# Patient Record
Sex: FEMALE | Race: WHITE | NOT HISPANIC OR LATINO | Employment: FULL TIME | URBAN - METROPOLITAN AREA
[De-identification: names, ages, dates, MRNs, and addresses within clinical notes are randomized per-mention and may not be internally consistent; named-entity substitution may affect disease eponyms.]

---

## 2017-05-02 DIAGNOSIS — B00.9 RECURRENT HERPES SIMPLEX: ICD-10-CM

## 2017-05-02 NOTE — TELEPHONE ENCOUNTER
Acyclovir 400 mg     Last Written Prescription Date: 3/9/17  Last Fill Quantity: 15, # refills: 11  Last Office Visit with [facility], [facility] or [facility] prescribing provider: 6/2/16   Next 5 appointments (look out 90 days)     May 12, 2017  8:30 AM CDT   PHYSICAL with Aleshia Meléndez PA-C   [facility]    [address] [place] [address] [id number] [phone]                   Creatinine   Date Value Ref Range Status   10/23/2012 0.90 0.52 - 1.04 mg/dL Final

## 2017-05-03 RX ORDER — ACYCLOVIR 400 MG/1
TABLET ORAL
Qty: 15 TABLET | Refills: 0 | Status: SHIPPED | OUTPATIENT
Start: 2017-05-03 | End: 2018-07-25

## 2017-05-05 DIAGNOSIS — N95.1 SYMPTOMATIC MENOPAUSAL OR FEMALE CLIMACTERIC STATES: ICD-10-CM

## 2017-05-05 NOTE — TELEPHONE ENCOUNTER
estradiol (VIVELLE-DOT) 0.075 MG/24HR  Last Written Prescription Date: 3/9/16  Last Fill Quantity: 24,  # refills: 4   Last Office Visit with [facility], [facility] or [facility] prescribing provider: 6/2/16                                         Next 5 appointments (look out 90 days)     May 12, 2017  8:30 AM CDT   PHYSICAL with Aleshia Meléndez PA-C   [facility]    [address] [place] [id number] [phone]

## 2017-05-08 RX ORDER — ESTRADIOL 0.07 MG/D
FILM, EXTENDED RELEASE TRANSDERMAL
Qty: 24 PATCH | Refills: 0 | Status: SHIPPED | OUTPATIENT
Start: 2017-05-08 | End: 2017-05-11

## 2017-05-11 NOTE — PATIENT INSTRUCTIONS
Preventive Health Recommendations  Female Ages 50 - 64    Yearly exam: See your health care provider every year in order to  o Review health changes.   o Discuss preventive care.    o Review your medicines if your doctor has prescribed any.      Get a Pap test every three years (unless you have an abnormal result and your provider advises testing more often).    If you get Pap tests with HPV test, you only need to test every 5 years, unless you have an abnormal result.     You do not need a Pap test if your uterus was removed (hysterectomy) and you have not had cancer.    You should be tested each year for STDs (sexually transmitted diseases) if you're at risk.     Have a mammogram every 1 to 2 years.    Have a colonoscopy at age 50, or have a yearly FIT test (stool test). These exams screen for colon cancer.      Have a cholesterol test every 5 years, or more often if advised.    Have a diabetes test (fasting glucose) every three years. If you are at risk for diabetes, you should have this test more often.     If you are at risk for osteoporosis (brittle bone disease), think about having a bone density scan (DEXA).    Shots: Get a flu shot each year. Get a tetanus shot every 10 years.    Nutrition:     Eat at least 5 servings of fruits and vegetables each day.    Eat whole-grain bread, whole-wheat pasta and brown rice instead of white grains and rice.    Talk to your provider about Calcium and Vitamin D.     Lifestyle    Exercise at least 150 minutes a week (30 minutes a day, 5 days a week). This will help you control your weight and prevent disease.    Limit alcohol to one drink per day.    No smoking.     Wear sunscreen to prevent skin cancer.     See your dentist every six months for an exam and cleaning.    See your eye doctor every 1 to 2 years.    [facility]:  [address]  [place] [address]  Call [phone] to schedule a mammogram or call the appointment line at  [phone].  Walk in appointments for screening mammograms welcome.    [facility]  [address] [place] [address]  Call [phone] to schedule a mammogram or call the appointment line at [phone].  Walk in appointments for screening mammograms welcome.    Same day Mammogram:  [facility]  [address] [place] [address]  [phone]    [facility]  Mammograms available:  2nd Friday AM of each month  3rd Saturday AM of each month  4th Tuesday PM of each month

## 2017-05-11 NOTE — PROGRESS NOTES
SUBJECTIVE:     CC: Ila Pierre is an 61 year old woman who presents for preventive health visit.     Healthy Habits:    Do you get at least three servings of calcium containing foods daily (dairy, green leafy vegetables, etc.)? yes    Amount of exercise or daily activities, outside of work: none    Problems taking medications regularly No    Medication side effects: No    Have you had an eye exam in the past two years? yes    Do you see a dentist twice per year? yes    Do you have sleep apnea, excessive snoring or daytime drowsiness?no    Patient states that she thought she pulled a muscle in january near her right breast and was supposed to get mammogram done today but they would not let her. Does she need a diagnostic mammo? Torn meniscus in right shoulder and has some hardness on that side by the breast.  acne      Duration: January 2017    Description (location/character/radiation): patient states that she has noticed an increase in adult acne    Intensity:  mild, moderate    Accompanying signs and symptoms: see above    History (similar episodes/previous evaluation): None    Precipitating or alleviating factors: Bleach might be making worse, has mild rash by the eye, just learned about this.      Therapies tried and outcome: retinol-1-2 months, not really helping     Joint Pain     Onset: march 2017    Description:   Location: right foot  Character: Sharp when walking, does not improve with more activity.  Most shoes cause pain.      Intensity: mild, moderate    Progression of Symptoms: worse    Accompanying Signs & Symptoms:  Other symptoms: lump on ball    History:   Previous similar pain: no       Precipitating factors:   Trauma or overuse: no     Alleviating factors:  Improved by: nothing       Therapies Tried and outcome: n/a      Today's PHQ-2 Score:   PHQ-2 ( 1999 Pfizer) 6/2/2016 3/9/2016   Q1: Little interest or pleasure in doing things 0 0   Q2: Feeling down, depressed or hopeless 0 0    PHQ-2 Score 0 0       Abuse: Current or Past(Physical, Sexual or Emotional)- No  Do you feel safe in your environment - Yes    Social History   Substance Use Topics     Smoking status: Former Smoker     Quit date: 10/19/1997     Smokeless tobacco: Never Used     Alcohol use Yes      Comment: 3 beer - 3 times a week     The patient does not drink >3 drinks per day nor >7 drinks per week.    Recent Labs   Lab Test  03/09/16   0945  02/06/15   0911   CHOL  183  164   HDL  90  84   LDL  82  67   TRIG  56  67   CHOLHDLRATIO   --   2.0   NHDL  93   --        Reviewed orders with patient.  Reviewed health maintenance and updated orders accordingly - Yes    Mammo Decision Support:  Patient over age 50, mutual decision to screen reflected in health maintenance.    Pertinent mammograms are reviewed under the imaging tab.  History of abnormal Pap smear: Status post benign hysterectomy. Health Maintenance and Surgical History updated.    Reviewed and updated as needed this visit by clinical staff  Tobacco  Allergies  Meds  Problems  Med Hx  Surg Hx  Fam Hx  Soc Hx          Reviewed and updated as needed this visit by Provider  Tobacco  Allergies  Meds  Problems  Med Hx  Surg Hx  Fam Hx  Soc Hx         ROS:  C: NEGATIVE for fever, chills, change in weight  INTEGUMENTARY/SKIN: acne on face, not improved with tretinoin.    E: NEGATIVE for vision changes or irritation  ENT: NEGATIVE for ear, mouth and throat problems  R: NEGATIVE for significant cough or SOB  BREAST: POSITIVE for mass with tenderness and pulling meniscus in right shoulder  CV: NEGATIVE for chest pain, palpitations or peripheral edema  GI: NEGATIVE for nausea, abdominal pain, heartburn, or change in bowel habits  : NEGATIVE for unusual urinary or vaginal symptoms. No vaginal bleeding.  MUSCULOSKELETAL:POSITIVE  for pain on base of right foot  N: NEGATIVE for weakness, dizziness or paresthesias  E: NEGATIVE for temperature intolerance, skin/hair  "changes  P: NEGATIVE for changes in mood or affect     Problem list, Medication list, Allergies, and Medical/Social/Surgical histories reviewed in [facility] and updated as appropriate.  BP Readings from Last 3 Encounters:   05/12/17 124/72   06/02/16 124/80   03/09/16 128/86    Wt Readings from Last 3 Encounters:   05/12/17 214 lb (97.1 kg)   06/02/16 218 lb 8 oz (99.1 kg)   03/09/16 221 lb (100.2 kg)            OBJECTIVE:     /72 (BP Location: Left arm, Patient Position: Chair, Cuff Size: Adult Regular)  Pulse 59  Temp 97.8  F (36.6  C) (Tympanic)  Resp 15  Ht 5' 9\" (1.753 m)  Wt 214 lb (97.1 kg)  SpO2 100%  BMI 31.6 kg/m2  EXAM:  GENERAL APPEARANCE: healthy, alert and no distress  EYES: Eyes grossly normal to inspection, PERRL and conjunctivae and sclerae normal  HENT: ear canals and TM's normal, nose and mouth without ulcers or lesions, oropharynx clear and oral mucous membranes moist  NECK: no adenopathy, no asymmetry, masses, or scars and thyroid normal to palpation  RESP: lungs clear to auscultation - no rales, rhonchi or wheezes  BREAST: 3-4 cm area of tenderness and tightness at 11-12:00 on right breast, no palpable axillary masses or adenopathy, no skin changes or nipple discharge.  CV: regular rate and rhythm, normal S1 S2, no S3 or S4, no murmur, click or rub, no peripheral edema and peripheral pulses strong  ABDOMEN: soft, nontender, no hepatosplenomegaly, no masses and bowel sounds normal  MS: no musculoskeletal defects are noted, gait is age appropriate without ataxia.  2 cm callus on ball of right foot, mildly tender to palpation.  SKIN: acne  NEURO: Normal strength and tone, sensory exam grossly normal, mentation intact and speech normal  PSYCH: mentation appears normal and affect normal/bright    ASSESSMENT/PLAN:         ICD-10-CM    1. Routine general medical examination at a health care facility Z00.00 LIPID REFLEX TO DIRECT LDL PANEL     Glucose   2. Chronic obstructive pulmonary disease, " "unspecified COPD type (H) J44.9 COPD ACTION PLAN   3. Vitamin D deficiency E55.9 Cholecalciferol (VITAMIN D) 2000 UNITS tablet   4. Symptomatic menopausal or female climacteric states N95.1 estradiol (VIVELLE-DOT) 0.075 MG/24HR BIW patch   5. Essential hypertension with goal blood pressure less than 140/90 I10 atenolol (TENORMIN) 50 MG tablet     Vitamin D Deficiency   6. Need for vaccination Z23 TD (ADULT, 7+) PRESERVE FREE     VACCINE ADMINISTRATION, INITIAL     Will see if removing bleach from laundry helps with face, if does not, will fill prescription for acne treatment.    Will soak right foot in warm water and file down callus with pomus  stone, avoid painful footwear.  If not improving, call and will put in order for podiatry.    Will get diagnostic mammogram on right due to mass and tenderness, orders placed.    COUNSELING:   Reviewed preventive health counseling, as reflected in patient instructions       reports that she quit smoking about 19 years ago. She has never used smokeless tobacco.    Estimated body mass index is 31.6 kg/(m^2) as calculated from the following:    Height as of this encounter: 5' 9\" (1.753 m).    Weight as of this encounter: 214 lb (97.1 kg).   Weight management plan: : -30 minutes of exercise 5 days a week (walking, jogging, housework, biking).  -Eat at least 5 servings of fruits and vegetables daily.   -Eat whole-grain bread, whole-wheat pasta and brown rice instead of white grains and rice.    Counseling Resources:  ATP IV Guidelines  Pooled Cohorts Equation Calculator  Breast Cancer Risk Calculator  FRAX Risk Assessment  ICSI Preventive Guidelines  Dietary Guidelines for Americans, 2010  USDA's MyPlate  ASA Prophylaxis  Lung CA Screening    Aleshia Meléndez PA-C  [facility]  "

## 2017-05-12 ENCOUNTER — OFFICE VISIT (OUTPATIENT)
Dept: FAMILY MEDICINE | Facility: CLINIC | Age: 62
End: 2017-05-12
Payer: COMMERCIAL

## 2017-05-12 VITALS
DIASTOLIC BLOOD PRESSURE: 72 MMHG | HEIGHT: 69 IN | SYSTOLIC BLOOD PRESSURE: 124 MMHG | WEIGHT: 214 LBS | RESPIRATION RATE: 15 BRPM | TEMPERATURE: 97.8 F | HEART RATE: 59 BPM | OXYGEN SATURATION: 100 % | BODY MASS INDEX: 31.7 KG/M2

## 2017-05-12 DIAGNOSIS — Z23 NEED FOR VACCINATION: ICD-10-CM

## 2017-05-12 DIAGNOSIS — I10 ESSENTIAL HYPERTENSION WITH GOAL BLOOD PRESSURE LESS THAN 140/90: ICD-10-CM

## 2017-05-12 DIAGNOSIS — E55.9 VITAMIN D DEFICIENCY: ICD-10-CM

## 2017-05-12 DIAGNOSIS — J44.9 CHRONIC OBSTRUCTIVE PULMONARY DISEASE, UNSPECIFIED COPD TYPE (H): ICD-10-CM

## 2017-05-12 DIAGNOSIS — Z00.00 ROUTINE GENERAL MEDICAL EXAMINATION AT A HEALTH CARE FACILITY: Primary | ICD-10-CM

## 2017-05-12 DIAGNOSIS — M79.671 RIGHT FOOT PAIN: ICD-10-CM

## 2017-05-12 DIAGNOSIS — N63.10 LUMP OF RIGHT BREAST: ICD-10-CM

## 2017-05-12 DIAGNOSIS — N95.1 SYMPTOMATIC MENOPAUSAL OR FEMALE CLIMACTERIC STATES: ICD-10-CM

## 2017-05-12 DIAGNOSIS — L70.0 ACNE VULGARIS: ICD-10-CM

## 2017-05-12 LAB
CHOLEST SERPL-MCNC: 152 MG/DL
GLUCOSE SERPL-MCNC: 87 MG/DL (ref 70–99)
HDLC SERPL-MCNC: 80 MG/DL
LDLC SERPL CALC-MCNC: 60 MG/DL
NONHDLC SERPL-MCNC: 72 MG/DL
TRIGL SERPL-MCNC: 60 MG/DL

## 2017-05-12 PROCEDURE — 99396 PREV VISIT EST AGE 40-64: CPT | Mod: 25 | Performed by: PHYSICIAN ASSISTANT

## 2017-05-12 PROCEDURE — 80061 LIPID PANEL: CPT | Performed by: PHYSICIAN ASSISTANT

## 2017-05-12 PROCEDURE — 36415 COLL VENOUS BLD VENIPUNCTURE: CPT | Performed by: PHYSICIAN ASSISTANT

## 2017-05-12 PROCEDURE — 82947 ASSAY GLUCOSE BLOOD QUANT: CPT | Performed by: PHYSICIAN ASSISTANT

## 2017-05-12 PROCEDURE — 90714 TD VACC NO PRESV 7 YRS+ IM: CPT | Performed by: PHYSICIAN ASSISTANT

## 2017-05-12 PROCEDURE — 90471 IMMUNIZATION ADMIN: CPT | Performed by: PHYSICIAN ASSISTANT

## 2017-05-12 PROCEDURE — 99214 OFFICE O/P EST MOD 30 MIN: CPT | Mod: 25 | Performed by: PHYSICIAN ASSISTANT

## 2017-05-12 PROCEDURE — 82306 VITAMIN D 25 HYDROXY: CPT | Performed by: PHYSICIAN ASSISTANT

## 2017-05-12 RX ORDER — ESTRADIOL 0.07 MG/D
FILM, EXTENDED RELEASE TRANSDERMAL
Qty: 24 PATCH | Refills: 3 | Status: SHIPPED | OUTPATIENT
Start: 2017-05-12 | End: 2018-07-13

## 2017-05-12 RX ORDER — CLINDAMYCIN PHOSPHATE AND BENZOYL PEROXIDE 10; 50 MG/G; MG/G
GEL TOPICAL AT BEDTIME
Qty: 45 G | Refills: 2 | Status: ON HOLD | OUTPATIENT
Start: 2017-05-12 | End: 2018-12-14

## 2017-05-12 RX ORDER — ATENOLOL 50 MG/1
50 TABLET ORAL DAILY
Qty: 90 TABLET | Refills: 3 | Status: SHIPPED | OUTPATIENT
Start: 2017-05-12 | End: 2018-07-13

## 2017-05-12 RX ORDER — CHOLECALCIFEROL (VITAMIN D3) 50 MCG
2000 TABLET ORAL DAILY
Qty: 100 TABLET | Refills: 3 | Status: ON HOLD | OUTPATIENT
Start: 2017-05-12 | End: 2018-12-14

## 2017-05-12 NOTE — LETTER
[facility]  [address]  [facility] [address]  [phone]                                                                                                           Ila Pierre  [address] [facility] [address]    May 16, 2017      Dear Ila,    The results of your recent tests were reviewed and are enclosed.     - Your Cholesterol is normal.  - Your glucose (screening for diabetes) was normal.  - Your Vitamin D level is normal.    Results for orders placed or performed in visit on 05/12/17   LIPID REFLEX TO DIRECT LDL PANEL   Result Value Ref Range    Cholesterol 152 <200 mg/dL    Triglycerides 60 <150 mg/dL    HDL Cholesterol 80 >49 mg/dL    LDL Cholesterol Calculated 60 <100 mg/dL    Non HDL Cholesterol 72 <130 mg/dL   Glucose   Result Value Ref Range    Glucose 87 70 - 99 mg/dL   Vitamin D Deficiency   Result Value Ref Range    Vitamin D Deficiency screening 52 20 - 75 ug/L     Thank you for choosing [facility].  We appreciate the opportunity to serve you and look forward to supporting your healthcare needs in the future.    If you have any questions or concerns, please call me or my staff at [phone].      Sincerely,        Aleshia Meléndez PA-C

## 2017-05-12 NOTE — NURSING NOTE
Screening Questionnaire for Adult Immunization    Are you sick today?   No   Do you have allergies to medications, food, a vaccine component or latex?   No   Have you ever had a serious reaction after receiving a vaccination?   No   Do you have a long-term health problem with heart disease, lung disease, asthma, kidney disease, metabolic disease (e.g. diabetes), anemia, or other blood disorder?   Yes-COPD, hypertension   Do you have cancer, leukemia, HIV/AIDS, or any other immune system problem?   No   In the past 3 months, have you taken medications that affect  your immune system, such as prednisone, other steroids, or anticancer drugs; drugs for the treatment of rheumatoid arthritis, Crohn s disease, or psoriasis; or have you had radiation treatments?   No   Have you had a seizure, or a brain or other nervous system problem?   No   During the past year, have you received a transfusion of blood or blood     products, or been given immune (gamma) globulin or antiviral drug?   No   For women: Are you pregnant or is there a chance you could become        pregnant during the next month?   No   Have you received any vaccinations in the past 4 weeks?   No     Immunization questionnaire answers were all negative.      MNVFC doesn't apply on this patient    Per orders of CHRISTIANO Proctor, injection of TD given by Shahida Soliman. Patient instructed to remain in clinic for 20 minutes afterwards, and to report any adverse reaction to me immediately.       Screening performed by Shahida Soliman on 5/12/2017 at 9:02 AM.

## 2017-05-12 NOTE — MR AVS SNAPSHOT
After Visit Summary   5/12/2017    Ila Pierre    MRN: [id number]           Patient Information     Date Of Birth          1955        Visit Information        Provider Department      5/12/2017 8:30 AM Aleshia Meléndez PA-C [facility]        Today's Diagnoses     Routine general medical examination at a health care facility    -  1    Chronic obstructive pulmonary disease, unspecified COPD type (H)        Vitamin D deficiency        Symptomatic menopausal or female climacteric states        Essential hypertension with goal blood pressure less than 140/90        Need for vaccination        Acne vulgaris        Right foot pain        Lump of right breast          Care Instructions      Preventive Health Recommendations  Female Ages 50 - 64    Yearly exam: See your health care provider every year in order to  o Review health changes.   o Discuss preventive care.    o Review your medicines if your doctor has prescribed any.      Get a Pap test every three years (unless you have an abnormal result and your provider advises testing more often).    If you get Pap tests with HPV test, you only need to test every 5 years, unless you have an abnormal result.     You do not need a Pap test if your uterus was removed (hysterectomy) and you have not had cancer.    You should be tested each year for STDs (sexually transmitted diseases) if you're at risk.     Have a mammogram every 1 to 2 years.    Have a colonoscopy at age 50, or have a yearly FIT test (stool test). These exams screen for colon cancer.      Have a cholesterol test every 5 years, or more often if advised.    Have a diabetes test (fasting glucose) every three years. If you are at risk for diabetes, you should have this test more often.     If you are at risk for osteoporosis (brittle bone disease), think about having a bone density scan (DEXA).    Shots: Get a flu shot each year. Get a tetanus  shot every 10 years.    Nutrition:     Eat at least 5 servings of fruits and vegetables each day.    Eat whole-grain bread, whole-wheat pasta and brown rice instead of white grains and rice.    Talk to your provider about Calcium and Vitamin D.     Lifestyle    Exercise at least 150 minutes a week (30 minutes a day, 5 days a week). This will help you control your weight and prevent disease.    Limit alcohol to one drink per day.    No smoking.     Wear sunscreen to prevent skin cancer.     See your dentist every six months for an exam and cleaning.    See your eye doctor every 1 to 2 years.    [facility] [place]:  [address]  [place] [address]  Call [phone] to schedule a mammogram or call the appointment line at [phone].  Walk in appointments for screening mammograms welcome.    [facility]  [address] [place] [address]  Call [phone] to schedule a mammogram or call the appointment line at [phone].  Walk in appointments for screening mammograms welcome.    Same day Mammogram:  [facility]  [address] [place] [address]  [phone]    [facility]  Mammograms available:  2nd Friday AM of each month  3rd Saturday AM of each month  4th Tuesday PM of each month            Follow-ups after your visit        Future tests that were ordered for you today     Open Future Orders        Priority Expected Expires Ordered    MA Diagnostic Digital Right Routine  5/12/2018 5/12/2017    MA Screening Digital Left Routine  5/12/2018 5/12/2017    US Breast Right Complete 4 Quadrants Routine  5/12/2018 5/12/2017            Who to contact     If you have questions or need follow up information about today's clinic visit or your schedule please contact [facility] directly at [phone].  Normal or non-critical lab and imaging results will be communicated to you by  "MyChart, letter or phone within 4 business days after the clinic has received the results. If you do not hear from us within 7 days, please contact the clinic through [employer] or phone. If you have a critical or abnormal lab result, we will notify you by phone as soon as possible.  Submit refill requests through [employer] or call your pharmacy and they will forward the refill request to us. Please allow 3 business days for your refill to be completed.          Additional Information About Your Visit        [employer] Information     [employer] lets you send messages to your doctor, view your test results, renew your prescriptions, schedule appointments and more. To sign up, go to [email or web address] . Click on \"Log in\" on the left side of the screen, which will take you to the Welcome page. Then click on \"Sign up Now\" on the right side of the page.     You will be asked to enter the access code listed below, as well as some personal information. Please follow the directions to create your username and password.     Your access code is: [email or web address]  Expires: 8/10/2017  8:52 AM     Your access code will  in 90 days. If you need help or a new code, please call your [place] clinic or [phone].        Care EveryWhere ID     This is your Care EveryWhere ID. This could be used by other organizations to access your [place] medical records  [id number]        Your Vitals Were     Pulse Temperature Respirations Height Pulse Oximetry BMI (Body Mass Index)    59 97.8  F (36.6  C) (Tympanic) 15 5' 9\" (1.753 m) 100% 31.6 kg/m2       Blood Pressure from Last 3 Encounters:   17 124/72   16 124/80   16 128/86    Weight from Last 3 Encounters:   17 214 lb (97.1 kg)   16 218 lb 8 oz (99.1 kg)   16 221 lb (100.2 kg)              We Performed the Following     COPD ACTION PLAN     Glucose     LIPID REFLEX TO DIRECT LDL PANEL     TD (ADULT, 7+) PRESERVE FREE     VACCINE ADMINISTRATION, INITIAL "     Vitamin D Deficiency          Today's Medication Changes          These changes are accurate as of: 5/12/17  8:52 AM.  If you have any questions, ask your nurse or doctor.               Start taking these medicines.        Dose/Directions    clindamycin-benzoyl Per (Refr) 1.2-5 % Gel   Used for:  Acne vulgaris   Started by:  Aleshia Meléndez PA-C        Apply topically At Bedtime   Quantity:  45 g   Refills:  2         These medicines have changed or have updated prescriptions.        Dose/Directions    estradiol 0.075 MG/24HR BIW patch   Commonly known as:  VIVELLE-DOT   This may have changed:  See the new instructions.   Used for:  Symptomatic menopausal or female climacteric states   Changed by:  Aleshia Meléndez PA-C        APPLY 1 PATCH EXTERNALLY TO THE SKIN 2 TIMES A WEEK   Quantity:  24 patch   Refills:  3            Where to get your medicines      These medications were sent to [facility] [id number] [place] [address] [address] AT [place] [id number], [facility] [address]     Phone:  [phone]     atenolol 50 MG tablet    estradiol 0.075 MG/24HR BIW patch    vitamin D 2000 UNITS tablet         Some of these will need a paper prescription and others can be bought over the counter.  Ask your nurse if you have questions.     Bring a paper prescription for each of these medications     clindamycin-benzoyl Per (Refr) 1.2-5 % Gel                Primary Care Provider Office Phone # Fax #    Aleshia Meléndez PA-C [phone]       [facility] [address]  [facility] [address]        Thank you!     Thank you for choosing [facility]  for your care. Our goal is always to provide you with excellent care. Hearing back from our patients is one way we can continue to improve our services. Please take a few minutes to complete the written survey that you may receive in the  mail after your visit with us. Thank you!             Your Updated Medication List - Protect others around you: Learn how to safely use, store and throw away your medicines at www.disposemymeds.org.          This list is accurate as of: 5/12/17  8:52 AM.  Always use your most recent med list.                   Brand Name Dispense Instructions for use    acyclovir 400 MG tablet    ZOVIRAX    15 tablet    TAKE 1 TABLET(400 MG) BY MOUTH THREE TIMES DAILY       ALEVE 220 MG tablet   Generic drug:  naproxen sodium      Take 1 tablet by mouth 2 times daily (with meals).       aspirin 81 MG tablet      Take 1 tablet by mouth daily.       atenolol 50 MG tablet    TENORMIN    90 tablet    Take 1 tablet (50 mg) by mouth daily       CALCIUM PO      Take 1 tablet by mouth daily.       CENTRUM SILVER per tablet      Take 1 tablet by mouth daily.       CLARITIN 10 MG tablet   Generic drug:  loratadine      Take 10 mg by mouth as needed       clindamycin-benzoyl Per (Refr) 1.2-5 % Gel     45 g    Apply topically At Bedtime       estradiol 0.075 MG/24HR BIW patch    VIVELLE-DOT    24 patch    APPLY 1 PATCH EXTERNALLY TO THE SKIN 2 TIMES A WEEK       l-tyrosine 500 MG Tabs      Take 2 tablets by mouth daily.       omega-3 fatty acids 1200 MG capsule      Take 1 capsule by mouth daily.       PROBIOTIC DAILY PO          vitamin D 2000 UNITS tablet     100 tablet    Take 2,000 Units by mouth daily

## 2017-05-12 NOTE — NURSING NOTE
"Chief Complaint   Patient presents with     Physical       Initial /72 (BP Location: Left arm, Patient Position: Chair, Cuff Size: Adult Regular)  Pulse 59  Temp 97.8  F (36.6  C) (Tympanic)  Resp 15  Ht 5' 9\" (1.753 m)  Wt 214 lb (97.1 kg)  SpO2 100%  BMI 31.6 kg/m2 Estimated body mass index is 31.6 kg/(m^2) as calculated from the following:    Height as of this encounter: 5' 9\" (1.753 m).    Weight as of this encounter: 214 lb (97.1 kg).  Medication Reconciliation: complete    "

## 2017-05-15 DIAGNOSIS — B00.9 RECURRENT HERPES SIMPLEX: Primary | ICD-10-CM

## 2017-05-15 LAB — DEPRECATED CALCIDIOL+CALCIFEROL SERPL-MC: 52 UG/L (ref 20–75)

## 2017-05-16 RX ORDER — ACYCLOVIR 400 MG/1
TABLET ORAL
Qty: 15 TABLET | Refills: 0 | Status: SHIPPED | OUTPATIENT
Start: 2017-05-16 | End: 2018-07-26

## 2017-05-16 NOTE — TELEPHONE ENCOUNTER
Acyclovir 400 mg     Last Written Prescription Date: 5/3/17  Last Fill Quantity: 15, # refills: 0  Last Office Visit with [facility] or [facility] prescribing provider: 5/12/17        Creatinine   Date Value Ref Range Status   10/23/2012 0.90 0.52 - 1.04 mg/dL Final

## 2017-05-16 NOTE — TELEPHONE ENCOUNTER
Call to patient without answer left message to call and schedule a lab appointment at her earliest convenience.

## 2017-05-16 NOTE — TELEPHONE ENCOUNTER
I just saw her for her physical and forgot to order it.  I put it in as a future lab and we can ask her to come get it drawn when she is able.

## 2017-05-25 ENCOUNTER — HOSPITAL ENCOUNTER (OUTPATIENT)
Dept: MAMMOGRAPHY | Facility: CLINIC | Age: 62
End: 2017-05-25
Attending: PHYSICIAN ASSISTANT
Payer: COMMERCIAL

## 2017-05-25 ENCOUNTER — HOSPITAL ENCOUNTER (OUTPATIENT)
Dept: MAMMOGRAPHY | Facility: CLINIC | Age: 62
Discharge: HOME | End: 2017-05-25
Attending: PHYSICIAN ASSISTANT | Admitting: PHYSICIAN ASSISTANT
Payer: COMMERCIAL

## 2017-05-25 DIAGNOSIS — N63.10 LUMP OF RIGHT BREAST: ICD-10-CM

## 2017-05-25 PROCEDURE — 76642 ULTRASOUND BREAST LIMITED: CPT | Mod: RT

## 2017-05-25 PROCEDURE — G0204 DX MAMMO INCL CAD BI: HCPCS

## 2018-07-13 DIAGNOSIS — N95.1 SYMPTOMATIC MENOPAUSAL OR FEMALE CLIMACTERIC STATES: ICD-10-CM

## 2018-07-13 DIAGNOSIS — I10 ESSENTIAL HYPERTENSION WITH GOAL BLOOD PRESSURE LESS THAN 140/90: ICD-10-CM

## 2018-07-13 RX ORDER — ATENOLOL 50 MG/1
TABLET ORAL
Qty: 90 TABLET | Refills: 0 | Status: SHIPPED | OUTPATIENT
Start: 2018-07-13 | End: 2018-07-25

## 2018-07-13 RX ORDER — ESTRADIOL 0.07 MG/D
FILM, EXTENDED RELEASE TRANSDERMAL
Qty: 24 PATCH | Refills: 0 | Status: SHIPPED | OUTPATIENT
Start: 2018-07-13 | End: 2018-07-25

## 2018-07-13 NOTE — TELEPHONE ENCOUNTER
"Requested Prescriptions   Pending Prescriptions Disp Refills     estradiol (VIVELLE-DOT) 0.075 MG/24HR BIW patch [Pharmacy Med Name: ESTRADIOL 0.075MG PATCH (TWICE WK)]  Last Written Prescription Date:  8/18/2017  Last Fill Quantity: 24 patch,  # refills: 3   Last Office Visit  5/12/2017        with  [facility], [facility] or  Health prescribing provider:     Future Office Visit:    Next 5 appointments (look out 90 days)     Jul 26, 2018  7:30 AM CDT   PHYSICAL with Aleshia Meléndez PA-C   [facility]    [address] [place] [id number] [phone]                24 patch 0     Sig: APPLY 1 PATCH TO SKIN 2 TIMES PER WEEK    Hormone Replacement Therapy Failed    7/13/2018  3:45 AM       Failed - Blood pressure under 140/90 in past 12 months    BP Readings from Last 3 Encounters:   05/12/17 124/72   06/02/16 124/80   03/09/16 128/86          Failed - Patient has mammogram in past 2 years on file if age 50-75       Passed - Recent (12 mo) or future (30 days) visit within the authorizing provider's specialty    Patient had office visit in the last 12 months or has a visit in the next 30 days with authorizing provider or within the authorizing provider's specialty.  See \"Patient Info\" tab in inbasket, or \"Choose Columns\" in Meds & Orders section of the refill encounter.           Passed - Patient is 18 years of age or older       Passed - No active pregnancy on record       Passed - No positive pregnancy test on record in past 12 months              atenolol (TENORMIN) 50 MG tablet [Pharmacy Med Name: ATENOLOL 50MG TABLETS]  Last Written Prescription Date:  5/12/2017  Last Fill Quantity: 90 tablet,  # refills: 3   Last Office Visit  5/12/2017        with  [facility], [facility] or  Health prescribing provider:     Future Office Visit:    Next 5 appointments (look out 90 days)     Jul 26, 2018  7:30 AM CDT   PHYSICAL with Aleshia " "Milady Meléndez PA-C   [facility] ([facility])    [address] [id number] [phone]                90 tablet 0     Sig: TAKE 1 TABLET(50 MG) BY MOUTH DAILY    Beta-Blockers Protocol Failed    7/13/2018  3:45 AM       Failed - Blood pressure under 140/90 in past 12 months    BP Readings from Last 3 Encounters:   05/12/17 124/72   06/02/16 124/80   03/09/16 128/86            Passed - Patient is age 6 or older       Passed - Recent (12 mo) or future (30 days) visit within the authorizing provider's specialty    Patient had office visit in the last 12 months or has a visit in the next 30 days with authorizing provider or within the authorizing provider's specialty.  See \"Patient Info\" tab in inbasket, or \"Choose Columns\" in Meds & Orders section of the refill encounter.              "

## 2018-07-26 ENCOUNTER — OFFICE VISIT (OUTPATIENT)
Dept: FAMILY MEDICINE | Facility: CLINIC | Age: 63
End: 2018-07-26
Payer: COMMERCIAL

## 2018-07-26 VITALS
HEIGHT: 69 IN | TEMPERATURE: 97.2 F | SYSTOLIC BLOOD PRESSURE: 136 MMHG | BODY MASS INDEX: 33.92 KG/M2 | DIASTOLIC BLOOD PRESSURE: 78 MMHG | WEIGHT: 229 LBS | HEART RATE: 55 BPM | OXYGEN SATURATION: 98 % | RESPIRATION RATE: 16 BRPM

## 2018-07-26 DIAGNOSIS — Z00.00 ROUTINE HISTORY AND PHYSICAL EXAMINATION OF ADULT: Primary | ICD-10-CM

## 2018-07-26 DIAGNOSIS — I10 ESSENTIAL HYPERTENSION WITH GOAL BLOOD PRESSURE LESS THAN 140/90: ICD-10-CM

## 2018-07-26 DIAGNOSIS — M20.41 HAMMER TOE OF RIGHT FOOT: ICD-10-CM

## 2018-07-26 DIAGNOSIS — N95.1 SYMPTOMATIC MENOPAUSAL OR FEMALE CLIMACTERIC STATES: ICD-10-CM

## 2018-07-26 DIAGNOSIS — J44.9 CHRONIC OBSTRUCTIVE PULMONARY DISEASE, UNSPECIFIED COPD TYPE (H): ICD-10-CM

## 2018-07-26 DIAGNOSIS — Z12.11 SCREENING FOR COLON CANCER: ICD-10-CM

## 2018-07-26 DIAGNOSIS — B00.9 RECURRENT HERPES SIMPLEX: ICD-10-CM

## 2018-07-26 LAB
ANION GAP SERPL CALCULATED.3IONS-SCNC: 7 MMOL/L (ref 3–14)
BUN SERPL-MCNC: 13 MG/DL (ref 7–30)
CALCIUM SERPL-MCNC: 8.5 MG/DL (ref 8.5–10.1)
CHLORIDE SERPL-SCNC: 108 MMOL/L (ref 94–109)
CHOLEST SERPL-MCNC: 150 MG/DL
CO2 SERPL-SCNC: 24 MMOL/L (ref 20–32)
CREAT SERPL-MCNC: 0.72 MG/DL (ref 0.52–1.04)
GFR SERPL CREATININE-BSD FRML MDRD: 82 ML/MIN/1.7M2
GLUCOSE SERPL-MCNC: 87 MG/DL (ref 70–99)
HDLC SERPL-MCNC: 74 MG/DL
LDLC SERPL CALC-MCNC: 64 MG/DL
NONHDLC SERPL-MCNC: 76 MG/DL
POTASSIUM SERPL-SCNC: 4.1 MMOL/L (ref 3.4–5.3)
SODIUM SERPL-SCNC: 139 MMOL/L (ref 133–144)
TRIGL SERPL-MCNC: 60 MG/DL

## 2018-07-26 PROCEDURE — 80061 LIPID PANEL: CPT | Performed by: PHYSICIAN ASSISTANT

## 2018-07-26 PROCEDURE — 99396 PREV VISIT EST AGE 40-64: CPT | Performed by: PHYSICIAN ASSISTANT

## 2018-07-26 PROCEDURE — 36415 COLL VENOUS BLD VENIPUNCTURE: CPT | Performed by: PHYSICIAN ASSISTANT

## 2018-07-26 PROCEDURE — 80048 BASIC METABOLIC PNL TOTAL CA: CPT | Performed by: PHYSICIAN ASSISTANT

## 2018-07-26 RX ORDER — ACYCLOVIR 400 MG/1
TABLET ORAL
Qty: 15 TABLET | Refills: 3 | Status: SHIPPED | OUTPATIENT
Start: 2018-07-26 | End: 2019-02-20

## 2018-07-26 RX ORDER — ESTRADIOL 0.07 MG/D
FILM, EXTENDED RELEASE TRANSDERMAL
Qty: 24 PATCH | Refills: 3 | Status: ON HOLD | OUTPATIENT
Start: 2018-07-26 | End: 2018-12-14

## 2018-07-26 RX ORDER — ATENOLOL 50 MG/1
TABLET ORAL
Qty: 90 TABLET | Refills: 3 | Status: SHIPPED | OUTPATIENT
Start: 2018-07-26 | End: 2019-10-06

## 2018-07-26 NOTE — PATIENT INSTRUCTIONS
Preventive Health Recommendations  Female Ages 50 - 64    Yearly exam: See your health care provider every year in order to  o Review health changes.   o Discuss preventive care.    o Review your medicines if your doctor has prescribed any.      Get a Pap test every three years (unless you have an abnormal result and your provider advises testing more often).    If you get Pap tests with HPV test, you only need to test every 5 years, unless you have an abnormal result.     You do not need a Pap test if your uterus was removed (hysterectomy) and you have not had cancer.    You should be tested each year for STDs (sexually transmitted diseases) if you're at risk.     Have a mammogram every 1 to 2 years.    Have a colonoscopy at age 50, or have a yearly FIT test (stool test). These exams screen for colon cancer.      Have a cholesterol test every 5 years, or more often if advised.    Have a diabetes test (fasting glucose) every three years. If you are at risk for diabetes, you should have this test more often.     If you are at risk for osteoporosis (brittle bone disease), think about having a bone density scan (DEXA).    Shots: Get a flu shot each year. Get a tetanus shot every 10 years.    Nutrition:     Eat at least 5 servings of fruits and vegetables each day.    Eat whole-grain bread, whole-wheat pasta and brown rice instead of white grains and rice.    Get adequate Calcium and Vitamin D.     Lifestyle    Exercise at least 150 minutes a week (30 minutes a day, 5 days a week). This will help you control your weight and prevent disease.    Limit alcohol to one drink per day.    No smoking.     Wear sunscreen to prevent skin cancer.     See your dentist every six months for an exam and cleaning.    See your eye doctor every 1 to 2 years.  [facility]:  [address]  [place] [address]  Call [phone] to schedule a mammogram or call the appointment line at [phone].  Walk  in appointments for screening mammograms welcome.    [facility]  [address] [facility] [address]  Call [phone] to schedule a mammogram or call the appointment line at [phone].  Walk in appointments for screening mammograms welcome.    Same day Mammogram:  [facility]  [address] [place] [place] [address]  [phone]    [facility]  Mammograms available:  2nd Friday AM of each month  3rd Saturday AM of each month  4th Tuesday PM of each month

## 2018-07-26 NOTE — PROGRESS NOTES
SUBJECTIVE:   CC: Ila Pierre is an 62 year old woman who presents for preventive health visit.     Physical   Annual:     Getting at least 3 servings of Calcium per day:  Yes    Bi-annual eye exam:  Yes    Dental care twice a year:  Yes    Sleep apnea or symptoms of sleep apnea:  None    Diet:  Regular (no restrictions)    Frequency of exercise:  1 day/week    Duration of exercise:  15-30 minutes    Taking medications regularly:  Yes    Medication side effects:  Not applicable    Additional concerns today:  YES        -patient was wearing orthotics for a period of time but they caused her to have a hammer toe on the second toe in, right side      Today's PHQ-2 Score:   PHQ-2 ( 1999 [employer]) 7/26/2018   Q1: Little interest or pleasure in doing things 0   Q2: Feeling down, depressed or hopeless 0   PHQ-2 Score 0   Q1: Little interest or pleasure in doing things Not at all   Q2: Feeling down, depressed or hopeless Not at all   PHQ-2 Score 0       Abuse: Current or Past(Physical, Sexual or Emotional)- No  Do you feel safe in your environment - Yes    Social History   Substance Use Topics     Smoking status: Former Smoker     Quit date: 10/19/1997     Smokeless tobacco: Never Used     Alcohol use Yes      Comment: 3 beer - 3 times a week     Alcohol Use 7/26/2018   If you drink alcohol do you typically have greater than 3 drinks per day OR greater than 7 drinks per week? No   No flowsheet data found.    Reviewed orders with patient.  Reviewed health maintenance and updated orders accordingly - Yes  BP Readings from Last 3 Encounters:   07/26/18 136/78   05/12/17 124/72   06/02/16 124/80    Wt Readings from Last 3 Encounters:   07/26/18 229 lb (103.9 kg)   05/12/17 214 lb (97.1 kg)   06/02/16 218 lb 8 oz (99.1 kg)                  Recent Labs   Lab Test  05/12/17   0857  03/09/16   0945  02/06/15   0911  10/23/12   0917  06/07/11   1332   LDL  60  82  67   --   78.8   HDL  80  90  84   --   62*   TRIG  60  56   "67   --   91   ALT   --    --    --   18  13   CR   --    --    --   0.90  0.73   GFRESTIMATED   --    --    --   65   --    GFRESTBLACK   --    --    --   79   --    POTASSIUM   --    --    --   5.0  4.2        Patient over age 50, mutual decision to screen reflected in health maintenance.    Pertinent mammograms are reviewed under the imaging tab.  History of abnormal Pap smear: Status post benign hysterectomy. Health Maintenance and Surgical History updated.  PAP / HPV 10/23/2012   PAP NIL     Reviewed and updated as needed this visit by clinical staff  Tobacco  Allergies  Meds  Problems  Med Hx  Surg Hx  Fam Hx  Soc Hx          Reviewed and updated as needed this visit by Provider  Tobacco  Allergies  Meds  Problems  Med Hx  Surg Hx  Fam Hx  Soc Hx           Review of Systems  CONSTITUTIONAL: NEGATIVE for fever, chills, change in weight  INTEGUMENTARY/SKIN: NEGATIVE for worrisome rashes, moles or lesions  EYES: NEGATIVE for vision changes or irritation  ENT: NEGATIVE for ear, mouth and throat problems  RESP: NEGATIVE for significant cough or SOB  BREAST: NEGATIVE for masses, tenderness or discharge  CV: NEGATIVE for chest pain, palpitations or peripheral edema  GI: NEGATIVE for nausea, abdominal pain, heartburn, or change in bowel habits  : NEGATIVE for unusual urinary or vaginal symptoms. No vaginal bleeding.  MUSCULOSKELETAL: NEGATIVE for significant arthralgias or myalgia  NEURO: NEGATIVE for weakness, dizziness or paresthesias  PSYCHIATRIC: NEGATIVE for changes in mood or affect      OBJECTIVE:   /78  Pulse 55  Temp 97.2  F (36.2  C) (Tympanic)  Resp 16  Ht 5' 8.5\" (1.74 m)  Wt 229 lb (103.9 kg)  SpO2 98%  BMI 34.31 kg/m2  Physical Exam  GENERAL APPEARANCE: healthy, alert and no distress  EYES: Eyes grossly normal to inspection, PERRL and conjunctivae and sclerae normal  HENT: ear canals and TM's normal, nose and mouth without ulcers or lesions, oropharynx clear and oral mucous " "membranes moist  NECK: no adenopathy, no asymmetry, masses, or scars and thyroid normal to palpation  RESP: lungs clear to auscultation - no rales, rhonchi or wheezes  BREAST: normal without masses, tenderness or nipple discharge and no palpable axillary masses or adenopathy  CV: regular rate and rhythm, normal S1 S2, no S3 or S4, no murmur, click or rub, no peripheral edema and peripheral pulses strong  ABDOMEN: soft, nontender, no hepatosplenomegaly, no masses and bowel sounds normal  MS: no musculoskeletal defects are noted and gait is age appropriate without ataxia  SKIN: no suspicious lesions or rashes  NEURO: Normal strength and tone, sensory exam grossly normal, mentation intact and speech normal  PSYCH: mentation appears normal and affect normal/bright    Diagnostic Test Results:  none     ASSESSMENT/PLAN:       ICD-10-CM    1. Routine history and physical examination of adult Z00.00 COPD ACTION PLAN     Lipid panel reflex to direct LDL Fasting     Basic metabolic panel   2. Essential hypertension with goal blood pressure less than 140/90 I10 atenolol (TENORMIN) 50 MG tablet   3. Chronic obstructive pulmonary disease, unspecified COPD type (H) J44.9    4. Symptomatic menopausal or female climacteric states N95.1 estradiol (VIVELLE-DOT) 0.075 MG/24HR BIW patch   5. Screening for colon cancer Z12.11 Fecal colorectal cancer screen FIT   6. Hammer toe of right foot M20.41    7. Recurrent herpes simplex B00.9 acyclovir (ZOVIRAX) 400 MG tablet     Will schedule mammogram.  Agrees to FIT.  [facility] if causing problems.    COUNSELING:  Reviewed preventive health counseling, as reflected in patient instructions    BP Readings from Last 1 Encounters:   07/26/18 136/78     Estimated body mass index is 34.31 kg/(m^2) as calculated from the following:    Height as of this encounter: 5' 8.5\" (1.74 m).    Weight as of this encounter: 229 lb (103.9 kg).    Weight " management plan: : -30 minutes of exercise 5 days a week (walking, jogging, housework, biking).  - Limit portion sizes and avoid eating out.  -Eat at least 5 servings of fruits and vegetables daily.   -Eat whole-grain bread, whole-wheat pasta and brown rice instead of white grains and rice.       reports that she quit smoking about 20 years ago. She has never used smokeless tobacco.      Counseling Resources:  ATP IV Guidelines  Pooled Cohorts Equation Calculator  Breast Cancer Risk Calculator  FRAX Risk Assessment  ICSI Preventive Guidelines  Dietary Guidelines for Americans, 2010  USDA's MyPlate  ASA Prophylaxis  Lung CA Screening    Aleshia Meléndez PA-C  [facility]  Answers for HPI/ROS submitted by the patient on 7/26/2018   PHQ-2 Score: 0

## 2018-07-26 NOTE — MR AVS SNAPSHOT
After Visit Summary   7/26/2018    Ila Pierre    MRN: [id number]           Patient Information     Date Of Birth          1955        Visit Information        Provider Department      7/26/2018 7:30 AM Aleshia Meléndez PA-C [facility]        Today's Diagnoses     Routine history and physical examination of adult    -  1    Screening for colon cancer        Essential hypertension with goal blood pressure less than 140/90        Symptomatic menopausal or female climacteric states        Hammer toe of right foot        Recurrent herpes simplex          Care Instructions      Preventive Health Recommendations  Female Ages 50 - 64    Yearly exam: See your health care provider every year in order to  o Review health changes.   o Discuss preventive care.    o Review your medicines if your doctor has prescribed any.      Get a Pap test every three years (unless you have an abnormal result and your provider advises testing more often).    If you get Pap tests with HPV test, you only need to test every 5 years, unless you have an abnormal result.     You do not need a Pap test if your uterus was removed (hysterectomy) and you have not had cancer.    You should be tested each year for STDs (sexually transmitted diseases) if you're at risk.     Have a mammogram every 1 to 2 years.    Have a colonoscopy at age 50, or have a yearly FIT test (stool test). These exams screen for colon cancer.      Have a cholesterol test every 5 years, or more often if advised.    Have a diabetes test (fasting glucose) every three years. If you are at risk for diabetes, you should have this test more often.     If you are at risk for osteoporosis (brittle bone disease), think about having a bone density scan (DEXA).    Shots: Get a flu shot each year. Get a tetanus shot every 10 years.    Nutrition:     Eat at least 5 servings of fruits and vegetables each day.    Eat  whole-grain bread, whole-wheat pasta and brown rice instead of white grains and rice.    Get adequate Calcium and Vitamin D.     Lifestyle    Exercise at least 150 minutes a week (30 minutes a day, 5 days a week). This will help you control your weight and prevent disease.    Limit alcohol to one drink per day.    No smoking.     Wear sunscreen to prevent skin cancer.     See your dentist every six months for an exam and cleaning.    See your eye doctor every 1 to 2 years.  [facility]:  [address]  [place] [address]  Call [phone] to schedule a mammogram or call the appointment line at [phone].  Walk in appointments for screening mammograms welcome.    [facility]  [address] [place] [address]  Call [phone] to schedule a mammogram or call the appointment line at [phone].  Walk in appointments for screening mammograms welcome.    Same day Mammogram:  [facility]  [address] [place], [place] [address] [phone]    [facility]  Mammograms available:  2nd Friday AM of each month  3rd Saturday AM of each month  4th Tuesday PM of each month            Follow-ups after your visit        Future tests that were ordered for you today     Open Future Orders        Priority Expected Expires Ordered    Fecal colorectal cancer screen FIT Routine 8/15/2018 10/17/2018 7/26/2018            Who to contact     If you have questions or need follow up information about today's clinic visit or your schedule please contact [facility] directly at [phone].  Normal or non-critical lab and imaging results will be communicated to you by MyChart, letter or phone within 4 business days after the clinic has received the results. If you do not hear from us within 7 days, please contact the clinic through MyChart or phone. If you have a critical or abnormal lab result, we will  "notify you by phone as soon as possible.  Submit refill requests through [employer] or call your pharmacy and they will forward the refill request to us. Please allow 3 business days for your refill to be completed.          Additional Information About Your Visit        Care EveryWhere ID     This is your Care EveryWhere ID. This could be used by other organizations to access your [facility] medical records  [id number]        Your Vitals Were     Pulse Temperature Respirations Height Pulse Oximetry BMI (Body Mass Index)    55 97.2  F (36.2  C) (Tympanic) 16 5' 8.5\" (1.74 m) 98% 34.31 kg/m2       Blood Pressure from Last 3 Encounters:   07/26/18 142/80   05/12/17 124/72   06/02/16 124/80    Weight from Last 3 Encounters:   07/26/18 229 lb (103.9 kg)   05/12/17 214 lb (97.1 kg)   06/02/16 218 lb 8 oz (99.1 kg)              We Performed the Following     Basic metabolic panel     COPD ACTION PLAN     Lipid panel reflex to direct LDL Fasting          Today's Medication Changes          These changes are accurate as of 7/26/18  7:58 AM.  If you have any questions, ask your nurse or doctor.               These medicines have changed or have updated prescriptions.        Dose/Directions    acyclovir 400 MG tablet   Commonly known as:  ZOVIRAX   This may have changed:  Another medication with the same name was removed. Continue taking this medication, and follow the directions you see here.   Used for:  Recurrent herpes simplex   Changed by:  Aleshia Meléndez PA-C        TAKE 1 TABLET(400 MG) BY MOUTH THREE TIMES DAILY   Quantity:  15 tablet   Refills:  3       estradiol 0.075 MG/24HR BIW patch   Commonly known as:  VIVELLE-DOT   This may have changed:  Another medication with the same name was removed. Continue taking this medication, and follow the directions you see here.   Used for:  Symptomatic menopausal or female climacteric states   Changed by:  Aleshia Meléndez PA-C        APPLY 1 PATCH TO " SKIN 2 TIMES PER WEEK   Quantity:  24 patch   Refills:  3         Stop taking these medicines if you haven't already. Please contact your care team if you have questions.     CALCIUM PO   Stopped by:  Aleshia Meléndez PA-C           CLARITIN 10 MG tablet   Generic drug:  loratadine   Stopped by:  Aleshia Meléndez PA-C                Where to get your medicines      These medications were sent to [place] [address] [facility] [address]   [address] [facility] [address]     Phone:  [phone]     acyclovir 400 MG tablet    atenolol 50 MG tablet    estradiol 0.075 MG/24HR BIW patch                Primary Care Provider Office Phone # Fax #    Aleshia Meléndez PA-C [phone]       [address]  [facility] [address]        Equal Access to Services     [facility] : Hadii aad ku hadasho Soomaali, waaxda luqadaha, qaybta kaalmada adeegyada, waxay idiin hayaan adeeg khbuzz laryan . So [facility] [phone].    ATENCIÓN: Si habla español, tiene a nunes disposición servicios gratuitos de asistencia lingüística. Jmi al [phone].    We comply with applicable federal civil rights laws and [place] laws. We do not discriminate on the basis of race, color, national origin, age, disability, sex, sexual orientation, or gender identity.            Thank you!     Thank you for choosing [facility]  for your care. Our goal is always to provide you with excellent care. Hearing back from our patients is one way we can continue to improve our services. Please take a few minutes to complete the written survey that you may receive in the mail after your visit with us. Thank you!             Your Updated Medication List - Protect others around you: Learn how to safely use, store and throw away your medicines at www.disposemymeds.org.          This list is accurate as of 7/26/18  7:58 AM.  Always use your most recent med  list.                   Brand Name Dispense Instructions for use Diagnosis    acyclovir 400 MG tablet    ZOVIRAX    15 tablet    TAKE 1 TABLET(400 MG) BY MOUTH THREE TIMES DAILY    Recurrent herpes simplex       ALEVE 220 MG tablet   Generic drug:  naproxen sodium      Take 1 tablet by mouth 2 times daily (with meals).        aspirin 81 MG tablet      Take 1 tablet by mouth daily.        atenolol 50 MG tablet    TENORMIN    90 tablet    TAKE 1 TABLET(50 MG) BY MOUTH DAILY    Essential hypertension with goal blood pressure less than 140/90       CENTRUM SILVER per tablet      Take 1 tablet by mouth daily.        clindamycin-benzoyl Per (Refr) 1.2-5 % Gel     45 g    Apply topically At Bedtime    Acne vulgaris       estradiol 0.075 MG/24HR BIW patch    VIVELLE-DOT    24 patch    APPLY 1 PATCH TO SKIN 2 TIMES PER WEEK    Symptomatic menopausal or female climacteric states       l-tyrosine 500 MG Tabs      Take 2 tablets by mouth daily.        omega-3 fatty acids 1200 MG capsule      Take 1 capsule by mouth daily.        PROBIOTIC DAILY PO           vitamin D 2000 units tablet     100 tablet    Take 2,000 Units by mouth daily    Vitamin D deficiency

## 2018-07-26 NOTE — LETTER
July 27, 2018      Ila Pierre  [address] [place] [id number]        Dear ,    We are writing to inform you of your test results.  - Your Cholesterol is normal.  - Your metabolic panel shows:  normal electrolytes (sodium, potassium, calcium), kidney function (creatinine and GFR) and fasting blood sugar.    Lipid panel reflex to direct LDL Fasting   Result Value Ref Range    Cholesterol 150 <200 mg/dL    Triglycerides 60 <150 mg/dL    HDL Cholesterol 74 >49 mg/dL    LDL Cholesterol Calculated 64 <100 mg/dL    Non HDL Cholesterol 76 <130 mg/dL   Basic metabolic panel   Result Value Ref Range    Sodium 139 133 - 144 mmol/L    Potassium 4.1 3.4 - 5.3 mmol/L    Chloride 108 94 - 109 mmol/L    Carbon Dioxide 24 20 - 32 mmol/L    Anion Gap 7 3 - 14 mmol/L    Glucose 87 70 - 99 mg/dL      Fasting specimen    Urea Nitrogen 13 7 - 30 mg/dL    Creatinine 0.72 0.52 - 1.04 mg/dL    GFR Estimate 82 >60 mL/min/1.7m2    Calcium 8.5 8.5 - 10.1 mg/dL     If you have any questions or concerns, please call the clinic at the number listed above.     Sincerely,      Aleshia Meléndez PA-C

## 2018-07-27 PROCEDURE — 82274 ASSAY TEST FOR BLOOD FECAL: CPT | Performed by: PHYSICIAN ASSISTANT

## 2018-07-27 NOTE — PROGRESS NOTES
Lab letter printed and signed.  Message comments below:  - Your Cholesterol is normal.  - Your metabolic panel shows:  normal electrolytes (sodium, potassium, calcium), kidney function (creatinine and GFR) and fasting blood sugar.

## 2018-07-28 ENCOUNTER — HEALTH MAINTENANCE LETTER (OUTPATIENT)
Age: 63
End: 2018-07-28

## 2018-08-01 DIAGNOSIS — Z12.11 SCREENING FOR COLON CANCER: ICD-10-CM

## 2018-08-01 LAB — HEMOCCULT STL QL IA: NEGATIVE

## 2018-10-31 ENCOUNTER — RADIANT APPOINTMENT (OUTPATIENT)
Dept: MAMMOGRAPHY | Facility: CLINIC | Age: 63
End: 2018-10-31
Attending: PHYSICIAN ASSISTANT
Payer: COMMERCIAL

## 2018-10-31 DIAGNOSIS — Z12.31 VISIT FOR SCREENING MAMMOGRAM: ICD-10-CM

## 2018-10-31 PROCEDURE — 77063 BREAST TOMOSYNTHESIS BI: CPT | Mod: TC

## 2018-10-31 PROCEDURE — 77067 SCR MAMMO BI INCL CAD: CPT | Mod: TC

## 2018-11-01 ENCOUNTER — HOSPITAL ENCOUNTER (OUTPATIENT)
Dept: MAMMOGRAPHY | Facility: CLINIC | Age: 63
Discharge: HOME | End: 2018-11-01
Attending: PHYSICIAN ASSISTANT | Admitting: PHYSICIAN ASSISTANT
Payer: COMMERCIAL

## 2018-11-01 DIAGNOSIS — R92.8 ABNORMAL MAMMOGRAM: ICD-10-CM

## 2018-11-01 PROCEDURE — 76642 ULTRASOUND BREAST LIMITED: CPT | Mod: RT

## 2018-11-05 ENCOUNTER — HOSPITAL ENCOUNTER (OUTPATIENT)
Dept: MAMMOGRAPHY | Facility: CLINIC | Age: 63
End: 2018-11-05
Attending: PHYSICIAN ASSISTANT
Payer: COMMERCIAL

## 2018-11-05 ENCOUNTER — HOSPITAL ENCOUNTER (OUTPATIENT)
Dept: MAMMOGRAPHY | Facility: CLINIC | Age: 63
Discharge: HOME | End: 2018-11-05
Attending: PHYSICIAN ASSISTANT | Admitting: PHYSICIAN ASSISTANT
Payer: COMMERCIAL

## 2018-11-05 DIAGNOSIS — R92.8 ABNORMAL MAMMOGRAM: ICD-10-CM

## 2018-11-05 DIAGNOSIS — N63.10 BREAST MASS, RIGHT: ICD-10-CM

## 2018-11-05 PROCEDURE — 88342 IMHCHEM/IMCYTCHM 1ST ANTB: CPT | Performed by: PHYSICIAN ASSISTANT

## 2018-11-05 PROCEDURE — 88305 TISSUE EXAM BY PATHOLOGIST: CPT | Mod: 26 | Performed by: PHYSICIAN ASSISTANT

## 2018-11-05 PROCEDURE — 88341 IMHCHEM/IMCYTCHM EA ADD ANTB: CPT | Mod: 26 | Performed by: PHYSICIAN ASSISTANT

## 2018-11-05 PROCEDURE — 25000125 ZZHC RX 250: Performed by: PHYSICIAN ASSISTANT

## 2018-11-05 PROCEDURE — 40000986 MA POST PROCEDURE RIGHT

## 2018-11-05 PROCEDURE — 88341 IMHCHEM/IMCYTCHM EA ADD ANTB: CPT | Performed by: PHYSICIAN ASSISTANT

## 2018-11-05 PROCEDURE — 27210206 US BREAST BIOPSY CORE NEEDLE RIGHT

## 2018-11-05 PROCEDURE — 38505 NEEDLE BIOPSY LYMPH NODES: CPT

## 2018-11-05 PROCEDURE — 88305 TISSUE EXAM BY PATHOLOGIST: CPT | Performed by: PHYSICIAN ASSISTANT

## 2018-11-05 PROCEDURE — 88342 IMHCHEM/IMCYTCHM 1ST ANTB: CPT | Mod: 26 | Performed by: PHYSICIAN ASSISTANT

## 2018-11-05 RX ADMIN — LIDOCAINE HYDROCHLORIDE 10 ML: 10 INJECTION, SOLUTION INFILTRATION; PERINEURAL at 11:01

## 2018-11-05 NOTE — DISCHARGE INSTRUCTIONS

## 2018-11-05 NOTE — DISCHARGE INSTRUCTIONS

## 2018-11-07 LAB — COPATH REPORT: NORMAL

## 2018-11-07 NOTE — PROGRESS NOTES
Pathology report reviewed with our breast radiologist Dr.Bryan Welsh. I phoned and informed patient of results below showing Atypical Papillary Lesions of the right axilla, and right breast.  Recommended follow up is Surgical Consultation, consider MRI.  Patient is now scheduled to meet with [facility]eral[facility] 11/12/2018 @ 11:15a.m. with a check in time of 11:00a.m.  I gave patient the address, directions and phone number to Dr. Perales's office.  Patient states no problems with biopsy site. Questions were answered and my phone number given if she has further questions or concerns.  Patient verbalized understanding and agrees with the plan of care.  Tracey Carmona, RN, BSN      Patient Name: FELIPA GARCIA   MR#: [id number]   Specimen #: [id number]   Collected: 11/5/2018   Received: 11/5/2018   Reported: 11/7/2018 14:44   Ordering Phy(s): CATHRYN COHEN   Additional Phy(s): ANNA FUENTES     For improved result formatting, select 'View Enhanced Report Format' under    Linked Documents section.     SPECIMEN(S):   A: Right ultrasound guided breast needle biopsy, 10:00, 7.0 cm from nipple   B: Right ultrasound guided axilla needle biopsy     FINAL DIAGNOSIS:   A: Breast, right, 10:00, 7 cm from nipple: Ultrasound guided core biopsy:   - Atypical papillary lesion, see comment     B: Breast, right axilla: Ultrasound guided core biopsy:   - Atypical papillary lesion, see comment     COMMENT:   Both core biopsies show an atypical papillary lesion with somewhat similar    morphologic features. More atypia   is present in the lesion at 10:00. 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 Definitive classification is deferred to the   surgical specimen.     This case was reviewed in consultation with Dr. Wild, Dr. Allen, and   Dr. Harding, who concurs with the   interpretation.     Electronically signed out by:      Holden Marie M.D.

## 2018-11-07 NOTE — PROGRESS NOTES
I also sent an EPIC message to the ordering provider or PCP to inform of the results and that I have already informed patient and she is set up for surgical consultation next Monday(11/12/18).  Tracey Carmona RN, BSN

## 2018-11-12 ENCOUNTER — OFFICE VISIT (OUTPATIENT)
Dept: SURGERY | Facility: CLINIC | Age: 63
End: 2018-11-12
Payer: COMMERCIAL

## 2018-11-12 ENCOUNTER — HOSPITAL ENCOUNTER (OUTPATIENT)
Facility: CLINIC | Age: 63
End: 2018-11-12
Attending: SURGERY | Admitting: SURGERY
Payer: COMMERCIAL

## 2018-11-12 ENCOUNTER — TELEPHONE (OUTPATIENT)
Dept: SURGERY | Facility: CLINIC | Age: 63
End: 2018-11-12

## 2018-11-12 VITALS
HEART RATE: 67 BPM | WEIGHT: 229 LBS | SYSTOLIC BLOOD PRESSURE: 134 MMHG | OXYGEN SATURATION: 100 % | DIASTOLIC BLOOD PRESSURE: 78 MMHG | BODY MASS INDEX: 33.92 KG/M2 | HEIGHT: 69 IN

## 2018-11-12 DIAGNOSIS — N63.10 BREAST MASS, RIGHT: Primary | ICD-10-CM

## 2018-11-12 PROCEDURE — 99244 OFF/OP CNSLTJ NEW/EST MOD 40: CPT | Performed by: SURGERY

## 2018-11-12 RX ORDER — CLINDAMYCIN PHOSPHATE 900 MG/50ML
900 INJECTION, SOLUTION INTRAVENOUS
Status: CANCELLED | OUTPATIENT
Start: 2018-11-12

## 2018-11-12 RX ORDER — CLINDAMYCIN PHOSPHATE 900 MG/50ML
900 INJECTION, SOLUTION INTRAVENOUS SEE ADMIN INSTRUCTIONS
Status: CANCELLED | OUTPATIENT
Start: 2018-11-12

## 2018-11-12 NOTE — MR AVS SNAPSHOT
After Visit Summary   11/12/2018    Ila Pierre    MRN: [id number]           Patient Information     Date Of Birth          1955        Visit Information        Provider Department      11/12/2018 11:15 AM Adolfo Perales MD [facility] General Surgery      Today's Diagnoses     Breast mass, right    -  1       Follow-ups after your visit        Your next 10 appointments already scheduled     Nov 16, 2018  7:30 AM CST   US BREAST RADIOACTIVE SEED LOCALIZATION RIGHT with SHBCUS2   [facility] ([facility])    [address] [place] [address], [address]  [facility] [id number] [phone]           How do I prepare for my exam? (Food and drink instructions) No Food and Drink Restrictions.  How do I prepare for my exam? (Other instructions) You do not need to do anything special to prepare for your exam.  What should I wear: Wear comfortable clothes.  How long does the exam take: Most ultrasounds take 30 to 60 minutes.  What should I bring: Bring a list of your medicines, including vitamins, minerals and over-the-counter drugs. It is safest to leave personal items at home.  Do I need a :  No  is needed.  What do I need to tell my doctor: Tell your doctor about any allergies you may have.  What should I do after the exam: No restrictions, You may resume normal activities.  What is this test: An ultrasound uses sound waves to make pictures of the body. Sound waves do not cause pain. The only discomfort may be the pressure of the wand against your skin or full bladder.  Who should I call with questions: If you have any questions, please call the Imaging Department where you will have your exam. Directions, parking instructions, and other information is available on our website, [email or web address].            Nov 16, 2018   Procedure with Adolfo Perales MD   [facility] (--)    [address]  [facility] [address]   [phone]            Nov 16, 2018  9:30 AM CST   NM SENTINEL NODE INJECTION BILATERAL with SHNM2   [facility] ([facility])    [address] [facility] [address]   [phone]           How do I prepare for my exam? (Food and drink instructions) For Adults:  No Food and Drink Restrictions.  For children: Young children may need medicine to help them relax (called sedation). We will tell you in advance if your child needs this medicine. If so, he or she cannot eat or drink before this test. You will need to arrive about 45 minutes early.  *If your child will not be sedated, he or she can eat and drink as normal.  How do I prepare for my exam? (Other instructions) You may take your normal medicines, unless your doctor tells you not to.  What should I wear: Please wear comfortable clothes.  How long does the exam take: Please allow 90 minutes for this exam.  What should I bring: Please bring a list of your medicines (including vitamins, minerals and over-the-counter drugs). Leave your valuables at home.  Do I need a :  No  is needed.  What do I need to tell my doctor: If you are feeding or may be pregnant, tell us before the exam.  What should I do after the exam: No restrictions, You may resume normal activities. The radioactive fluid will leave your body when you urinate (use the toilet).  *If your child was sedated, we will bring him or her to the recovery room. It may take up to 90 minutes before your child is ready to go home. We will give you clear guidelines about how to care for your child at home. Be sure to ask any questions you may have.  What is this test:  For these tests, we will inject a small amount of radioactive fluid into your arm or hand (about the amount of radiation you would get in an X-ray). If you are having a GFR, we will test your blood to measure the radioactivity. This tells us  how well your kidneys are filtering (cleaning) your blood. If you are having a renogram (kidney scan), we take pictures of the kidneys to see how quickly they can remove the radioactive fluid from your body. This tells us how well your kidneys are working. The fluid helps the kidneys show up on our video screen.  Other information about my exam (For children): We may place a catheter (tube) in the bladder. This tube will drain urine from the body. A parent or other adult may stay with the child in the exam room.  Who should I call with questions: Please call your Imaging Department at your exam site with any questions. Directions, parking instructions, and other information is available on our website, [email or web address].              Future tests that were ordered for you today     Open Future Orders        Priority Expected Expires Ordered    US Breast Radioactive Seed Placement, 1St Lesion Right Routine 11/12/2018 11/12/2019 11/12/2018    US Breast Radioactive Seed Placement, Ea Addl Lesion Routine 11/12/2018 11/12/2019 11/12/2018    MA Breast Specimen Right OR Routine 11/12/2018 11/12/2019 11/12/2018    MA Post Procedure Right Routine 11/12/2018 11/12/2019 11/12/2018    NM Lymphoscintigraphy Injection only Routine 11/12/2018 11/12/2019 11/12/2018            Who to contact     If you have questions or need follow up information about today's clinic visit or your schedule please contact [facility] directly at [phone].  Normal or non-critical lab and imaging results will be communicated to you by MyChart, letter or phone within 4 business days after the clinic has received the results. If you do not hear from us within 7 days, please contact the clinic through MyChart or phone. If you have a critical or abnormal lab result, we will notify you by phone as soon as possible.  Submit refill requests through [employer] or call your pharmacy and they will forward the refill request to us. Please allow  "3 business days for your refill to be completed.          Additional Information About Your Visit        MyChart Information     [employer] lets you send messages to your doctor, view your test results, renew your prescriptions, schedule appointments and more. To sign up, go to [email or web address] . Click on \"Log in\" on the left side of the screen, which will take you to the Welcome page. Then click on \"Sign up Now\" on the right side of the page.     You will be asked to enter the access code listed below, as well as some personal information. Please follow the directions to create your username and password.     Your access code is: [email or web address]  Expires: 2/10/2019 12:08 PM     Your access code will  in 90 days. If you need help or a new code, please call your [facility] clinic or [phone].        Care EveryWhere ID     This is your Care EveryWhere ID. This could be used by other organizations to access your [facility] medical records  [id number]        Your Vitals Were     Pulse Height Pulse Oximetry BMI (Body Mass Index)          67 5' 8.5\" (1.74 m) 100% 34.31 kg/m2         Blood Pressure from Last 3 Encounters:   18 134/78   18 136/78   17 124/72    Weight from Last 3 Encounters:   18 229 lb (103.9 kg)   18 229 lb (103.9 kg)   17 214 lb (97.1 kg)               Primary Care Provider Office Phone # Fax #    Aleshia Meléndez PA-C [phone]       [address] [facility] [address]  [facility] [address]        Equal Access to Services     [facility] : Hadrand Forde, wajordenda lucarola, qaybta kaalmada josefa, keegan lomas. So [facility] [phone].    ATENCIÓN: Si habla español, tiene a nunes disposición servicios gratuitos de asistencia lingüística. Llame al [phone].    We comply with applicable federal civil rights laws and [place] laws. We do not discriminate on the basis of race, color, national origin, " age, disability, sex, sexual orientation, or gender identity.            Thank you!     Thank you for choosing [facility]  for your care. Our goal is always to provide you with excellent care. Hearing back from our patients is one way we can continue to improve our services. Please take a few minutes to complete the written survey that you may receive in the mail after your visit with us. Thank you!             Your Updated Medication List - Protect others around you: Learn how to safely use, store and throw away your medicines at www.disposemymeds.org.          This list is accurate as of 11/12/18 12:08 PM.  Always use your most recent med list.                   Brand Name Dispense Instructions for use Diagnosis    acyclovir 400 MG tablet    ZOVIRAX    15 tablet    TAKE 1 TABLET(400 MG) BY MOUTH THREE TIMES DAILY    Recurrent herpes simplex       ALEVE 220 MG tablet   Generic drug:  naproxen sodium      Take 1 tablet by mouth 2 times daily (with meals).        aspirin 81 MG tablet      Take 1 tablet by mouth daily.        atenolol 50 MG tablet    TENORMIN    90 tablet    TAKE 1 TABLET(50 MG) BY MOUTH DAILY    Essential hypertension with goal blood pressure less than 140/90       CENTRUM SILVER per tablet      Take 1 tablet by mouth daily.        clindamycin-benzoyl Per (Refr) 1.2-5 % Gel     45 g    Apply topically At Bedtime    Acne vulgaris       estradiol 0.075 MG/24HR BIW patch    VIVELLE-DOT    24 patch    APPLY 1 PATCH TO SKIN 2 TIMES PER WEEK    Symptomatic menopausal or female climacteric states       l-tyrosine 500 MG Tabs      Take 2 tablets by mouth daily.        omega-3 fatty acids 1200 MG capsule      Take 1 capsule by mouth daily.        PROBIOTIC DAILY PO           vitamin D 2000 units tablet     100 tablet    Take 2,000 Units by mouth daily    Vitamin D deficiency

## 2018-11-12 NOTE — LETTER
2018    Re: Ila Pierre, : 1955    HISTORY OF PRESENT ILLNESS:  Ila Pierre is a 63 year old female who is seen in consultation at the request of Dr. Meléndez for evaluation of new right breast mass.  This was noticed on screening mammogram.  Mother had lumpectomy well into her 9th decade and her sister had mastectomy at age 60. Unclear of pathology on them.     REVIEW OF SYSTEMS:  Constitutional:  Negative for chills, fatigue, fever and weight change.  Neuro: No extremity, nor facial weakness  Psych:  No unexpected changes in mood  Eyes:  Negative for new vision problems.  ENT:  Negative for ENT pain.  Cardiovascular:  Negative for chest pain, palpitations.  Respiratory:  Negative for cough, dyspnea.  Gastrointestinal:  Negative for abdominal pain.     Musculoskeletal:  Negative for new arthralgias or myalgias.  Integumentary:  No masses or rashes     EXAM:  GENERAL: healthy, alert and no distress      HEENT: moist mucus membranes, no scleral icterus,   CARDIOVASCULAR:  RRR, No JVD  NEURO:  Alert;  well oriented to time, place and person.  RESPIRATORY: non labored breathing  NECK: Neck supple. No noticeable masses.  No lymphadenopathy noted.  ABDOMEN/GI: soft,  EXTREMITIES: warm and well perfused, no edema  SKIN: No suspicious lesions or rashes  LYMPH: Normal axillary/cervical lymph nodes  BREAST: no palpable masses, no skin retraction.  Slight bruise at biopsy site.     LABS/Imaging: mammograms, and ultrasounds reviewed     ASSESSMENT:  Ila Pierre suffers from 1. Breast mass, right     - US Breast Radioactive Seed Placement, 1St Lesion Right; Future  - US Breast Radioactive Seed Placement, Ea Addl Lesion; Future  - MA Breast Specimen Right OR; Future  - MA Post Procedure Right; Future  - NM Lymphoscintigraphy Injection only; Future     PLAN:  preop seeds localization, slnb, and bct of right breast     Ila Pierre understands the risk, benefits, hopeful outcomes,  and possible complications, both in the short and in the long term.  All her questions answered, she will like to proceed with the propose procedure in the near future.     It is my pleasure to participate in the care of Ila Pierre. Thank you for this consultation.      If you have any questions please give me a call.     Best regards,  Adolfo Perales MD

## 2018-11-12 NOTE — NURSING NOTE
Breast Patients    BREAST PATIENTS (ALL)    1-Do you have any of the following symptoms? Other: 3d screening mammogram  2-In which breast are you having the symptoms? right  3-Do you use hormones?  Yes  Type: estradiol  Dosage: 0.75 twice weekly patch  4-Have you had a Mammogram? Yes  Where: [facility]  Date: 11/5/18  5-Have you ever had a breast cyst drained? No  6-Have you ever had a breast biopsy? Yes  Side: Right  Date: 11/9/18  7-Have you ever had a Breast Cancer? No   8-Is there a history of Breast Cancer in your family? Yes   Relationship to you:    Mother  Sister  9-Have you ever had Ovarian Cancer? No  10-Is there a history of Ovarian Cancer in your family? No  11-Summarize your caffeine intake (i.e. coffee, tea, chocolate, soda etc.): 3 cups of coffee in the morning    BREAST PATIENTS (FEMALE)    12-What age did your periods begin? 17  13-Date your last menstrual period began? 12 years ago - hysterectomy  14-Number of full-term pregnancies: 0  15-Your age when your first child was born? N/A  16-Did you nurse your children? N/A  17-Are you pregnant now? No  18-Have you begun menopause? Yes  Age Menopause began:  2012 19-Have you had either ovary removed?Yes  Date of Surgery:  2012 hysterectomy  20-Do you have breast implants? No    Gabriela Mccloud RN

## 2018-11-12 NOTE — TELEPHONE ENCOUNTER
Type of surgery: Seed localized right breast partial mastectomy with right sentinel lymph node biopsy  Location of surgery: [facility]  Date and time of surgery: 11/16/18 at 9:30am  Surgeon: Dr. Adolfo Perales  Pre-Op Appt Date: Patient to schedule  Post-Op Appt Date: Patient to schedule   Packet sent out: Yes  Pre-cert/Authorization completed:  Not Applicable  Date: 11/21/18

## 2018-11-13 ENCOUNTER — OFFICE VISIT (OUTPATIENT)
Dept: FAMILY MEDICINE | Facility: CLINIC | Age: 63
End: 2018-11-13
Payer: COMMERCIAL

## 2018-11-13 VITALS
BODY MASS INDEX: 34.76 KG/M2 | DIASTOLIC BLOOD PRESSURE: 90 MMHG | OXYGEN SATURATION: 97 % | HEART RATE: 59 BPM | WEIGHT: 232 LBS | RESPIRATION RATE: 16 BRPM | TEMPERATURE: 98.6 F | SYSTOLIC BLOOD PRESSURE: 142 MMHG

## 2018-11-13 DIAGNOSIS — N63.10 BREAST MASS, RIGHT: ICD-10-CM

## 2018-11-13 DIAGNOSIS — Z01.818 PREOP GENERAL PHYSICAL EXAM: Primary | ICD-10-CM

## 2018-11-13 LAB
ERYTHROCYTE [DISTWIDTH] IN BLOOD BY AUTOMATED COUNT: 14.1 % (ref 10–15)
HCT VFR BLD AUTO: 46.8 % (ref 35–47)
HGB BLD-MCNC: 15.2 G/DL (ref 11.7–15.7)
MCH RBC QN AUTO: 31.8 PG (ref 26.5–33)
MCHC RBC AUTO-ENTMCNC: 32.5 G/DL (ref 31.5–36.5)
MCV RBC AUTO: 98 FL (ref 78–100)
PLATELET # BLD AUTO: 337 10E9/L (ref 150–450)
RBC # BLD AUTO: 4.78 10E12/L (ref 3.8–5.2)
WBC # BLD AUTO: 7.5 10E9/L (ref 4–11)

## 2018-11-13 PROCEDURE — 36415 COLL VENOUS BLD VENIPUNCTURE: CPT | Performed by: PHYSICIAN ASSISTANT

## 2018-11-13 PROCEDURE — 85027 COMPLETE CBC AUTOMATED: CPT | Performed by: PHYSICIAN ASSISTANT

## 2018-11-13 PROCEDURE — 93000 ELECTROCARDIOGRAM COMPLETE: CPT | Performed by: PHYSICIAN ASSISTANT

## 2018-11-13 PROCEDURE — 99214 OFFICE O/P EST MOD 30 MIN: CPT | Performed by: PHYSICIAN ASSISTANT

## 2018-11-13 NOTE — MR AVS SNAPSHOT
After Visit Summary   11/13/2018    Ila Pierre    MRN: [id number]           Patient Information     Date Of Birth          1955        Visit Information        Provider Department      11/13/2018 3:50 PM Aleshia Meléndez PA-C [facility]        Today's Diagnoses     Preop general physical exam    -  1    Breast mass, right          Care Instructions      Before Your Surgery      Call your surgeon if there is any change in your health. This includes signs of a cold or flu (such as a sore throat, runny nose, cough, rash or fever).    Do not smoke, drink alcohol or take over the counter medicine (unless your surgeon or primary care doctor tells you to) for the 24 hours before and after surgery.    If you take prescribed drugs: Follow your doctor s orders about which medicines to take and which to stop until after surgery.    Eating and drinking prior to surgery: follow the instructions from your surgeon    Take a shower or bath the night before surgery. Use the soap your surgeon gave you to gently clean your skin. If you do not have soap from your surgeon, use your regular soap. Do not shave or scrub the surgery site.  Wear clean pajamas and have clean sheets on your bed.           Follow-ups after your visit        Follow-up notes from your care team     Return in about 6 months (around 5/13/2019) for Annual Exam.      Your next 10 appointments already scheduled     Nov 16, 2018  7:30 AM CST   US BREAST RADIOACTIVE SEED LOCALIZATION RIGHT with SHBCUS2   [facility] [place] ([facility])    [address] [place] [address], [address] [place] [id number] [phone]           How do I prepare for my exam? (Food and drink instructions) No Food and Drink Restrictions.  How do I prepare for my exam? (Other instructions) You do not need to do anything special to prepare for your exam.  What should I wear: Wear comfortable  clothes.  How long does the exam take: Most ultrasounds take 30 to 60 minutes.  What should I bring: Bring a list of your medicines, including vitamins, minerals and over-the-counter drugs. It is safest to leave personal items at home.  Do I need a :  No  is needed.  What do I need to tell my doctor: Tell your doctor about any allergies you may have.  What should I do after the exam: No restrictions, You may resume normal activities.  What is this test: An ultrasound uses sound waves to make pictures of the body. Sound waves do not cause pain. The only discomfort may be the pressure of the wand against your skin or full bladder.  Who should I call with questions: If you have any questions, please call the Imaging Department where you will have your exam. Directions, parking instructions, and other information is available on our website, [email or web address].            Nov 16, 2018  7:30 AM CST   US BREAST RADIOACTIVE SEED LOCALIZATION ADDITIONAL with SHBCUS2,  BREAST NURSE, SH BREAST RAD   [facility] ([facility])    [address] [place] [address], [address] [place] [id number] [phone]           How do I prepare for my exam? (Food and drink instructions) No Food and Drink Restrictions.  How do I prepare for my exam? (Other instructions) You do not need to do anything special to prepare for your exam.  What should I wear: Wear comfortable clothes.  How long does the exam take: Most ultrasounds take 30 to 60 minutes.  What should I bring: Bring a list of your medicines, including vitamins, minerals and over-the-counter drugs. It is safest to leave personal items at home.  Do I need a :  No  is needed.  What do I need to tell my doctor: Tell your doctor about any allergies you may have.  What should I do after the exam: No restrictions, You may resume normal activities.  What is this test: An ultrasound uses sound waves to make pictures of the body.  Sound waves do not cause pain. The only discomfort may be the pressure of the wand against your skin or full bladder.  Who should I call with questions: If you have any questions, please call the Imaging Department where you will have your exam. Directions, parking instructions, and other information is available on our website, [email or web address].            Nov 16, 2018  8:00 AM CST   MA POST PROCEDURE RIGHT with SHBCMA6   [facility] [place] ([facility])    [address] [place] [address], [address]  [facility] [address] [phone]           How do I prepare for my exam? (Food and drink instructions) No Food and Drink Restrictions.  How do I prepare for my exam? (Other instructions) Do not use any powder, lotion or deodorant under your arms or on your breast. If you do, we will ask you to remove it before your exam.  What should I wear: Wear comfortable, two-piece clothing.  How long does the exam take: Most scans will take 15 minutes.  What should I bring: Bring any previous mammograms from other facilities or have them mailed to the breast center.  Do I need a :  No  is needed.  What do I need to tell my doctor: If you have any allergies, tell your care team.  What should I do after the exam: No restrictions, You may resume normal activities.  What is this test: This test is an x-ray of the breast to look for breast disease. The breast is pressed between two plates to flatten and spread the tissue. An X-ray is taken of the breast from different angles.  Who should I call with questions: If you have any questions, please call the Imaging Department where you will have your exam. Directions, parking instructions, and other information is available on our website, [email or web address].  Other information about my exam Three-dimensional (3D) mammograms are available at [place] locations in [facility], [facility], [place], and [place]. [place]  "Health locations include [place] and the [facility] in [place].  Benefits of 3D mammograms include: * Improved rate of cancer detection * Decreases your chance of having to go back for more tests, which means fewer: * \"False-positive\" results (This means that there is an abnormal area but it isn't cancer.) * Invasive testing procedures, such as a biopsy or surgery * Can provide clearer images of the breast if you have dense breast tissue.  *3D mammography is an optional exam that anyone can have with a 2D mammogram. It doesn't replace or take the place of a 2D mammogram. 2D mammograms remain an effective screening test for all women.  Not all insurance companies cover the cost of a 3D mammogram. Check with your insurance.            Nov 16, 2018  8:30 AM CST   NM SENTINEL NODE INJECTION BILATERAL with SHNM2   [facility] ([facility])    [address] [place] [id number]   [phone]           How do I prepare for my exam? (Food and drink instructions) For Adults:  No Food and Drink Restrictions.  For children: Young children may need medicine to help them relax (called sedation). We will tell you in advance if your child needs this medicine. If so, he or she cannot eat or drink before this test. You will need to arrive about 45 minutes early.  *If your child will not be sedated, he or she can eat and drink as normal.  How do I prepare for my exam? (Other instructions) You may take your normal medicines, unless your doctor tells you not to.  What should I wear: Please wear comfortable clothes.  How long does the exam take: Please allow 90 minutes for this exam.  What should I bring: Please bring a list of your medicines (including vitamins, minerals and over-the-counter drugs). Leave your valuables at home.  Do I need a :  No  is needed.  What do I need to tell my doctor: If you are feeding or may be pregnant, tell us before " the exam.  What should I do after the exam: No restrictions, You may resume normal activities. The radioactive fluid will leave your body when you urinate (use the toilet).  *If your child was sedated, we will bring him or her to the recovery room. It may take up to 90 minutes before your child is ready to go home. We will give you clear guidelines about how to care for your child at home. Be sure to ask any questions you may have.  What is this test:  For these tests, we will inject a small amount of radioactive fluid into your arm or hand (about the amount of radiation you would get in an X-ray). If you are having a GFR, we will test your blood to measure the radioactivity. This tells us how well your kidneys are filtering (cleaning) your blood. If you are having a renogram (kidney scan), we take pictures of the kidneys to see how quickly they can remove the radioactive fluid from your body. This tells us how well your kidneys are working. The fluid helps the kidneys show up on our video screen.  Other information about my exam (For children): We may place a catheter (tube) in the bladder. This tube will drain urine from the body. A parent or other adult may stay with the child in the exam room.  Who should I call with questions: Please call your Imaging Department at your exam site with any questions. Directions, parking instructions, and other information is available on our website, [email or web address].            Nov 16, 2018   Procedure with Adolfo Perales MD   [facility] PeriOP Services (--)    [address]  [facility] [address]   [phone]            Nov 16, 2018  9:15 AM CST   [facility] Same Day Surgery with Adolfo Perales MD   [facility] Surgery [facility]uling ([facility])    [facility] Surgery [facility] ([facility])   [phone]            Nov 16, 2018  9:30 AM CST   MA BREAST SPECIMEN RIGHT OR with SHMASP2   [place]  "[facility] ([facility])    [address], [address]  [facility] [address] [phone]           How do I prepare for my exam? (Food and drink instructions) No Food and Drink Restrictions.  How do I prepare for my exam? (Other instructions) Do not use any powder, lotion or deodorant under your arms or on your breast. If you do, we will ask you to remove it before your exam.  What should I wear: Wear comfortable, two-piece clothing.  How long does the exam take: Most scans will take 15 minutes.  What should I bring: Bring any previous mammograms from other facilities or have them mailed to the breast center.  Do I need a :  No  is needed.  What do I need to tell my doctor: If you have any allergies, tell your care team.  What should I do after the exam: No restrictions, You may resume normal activities.  What is this test: This test is an x-ray of the breast to look for breast disease. The breast is pressed between two plates to flatten and spread the tissue. An X-ray is taken of the breast from different angles.  Who should I call with questions: If you have any questions, please call the Imaging Department where you will have your exam. Directions, parking instructions, and other information is available on our website, [email or web address].  Other information about my exam Three-dimensional (3D) mammograms are available at [place] locations in [facility], [place], [facility], [place], and [place].  Health locations include [place] and the [facility] in [place].  Benefits of 3D mammograms include: * Improved rate of cancer detection * Decreases your chance of having to go back for more tests, which means fewer: * \"False-positive\" results (This means that there is an abnormal area but it isn't cancer.) * Invasive testing procedures, such as a biopsy or surgery * Can provide clearer images of the breast if you have " "dense breast tissue.  *3D mammography is an optional exam that anyone can have with a 2D mammogram. It doesn't replace or take the place of a 2D mammogram. 2D mammograms remain an effective screening test for all women.  Not all insurance companies cover the cost of a 3D mammogram. Check with your insurance.              Who to contact     If you have questions or need follow up information about today's clinic visit or your schedule please contact [facility] directly at [phone].  Normal or non-critical lab and imaging results will be communicated to you by [employer], letter or phone within 4 business days after the clinic has received the results. If you do not hear from us within 7 days, please contact the clinic through [employer] or phone. If you have a critical or abnormal lab result, we will notify you by phone as soon as possible.  Submit refill requests through [employer] or call your pharmacy and they will forward the refill request to us. Please allow 3 business days for your refill to be completed.          Additional Information About Your Visit        [employer] Information     [employer] lets you send messages to your doctor, view your test results, renew your prescriptions, schedule appointments and more. To sign up, go to [email or web address] . Click on \"Log in\" on the left side of the screen, which will take you to the Welcome page. Then click on \"Sign up Now\" on the right side of the page.     You will be asked to enter the access code listed below, as well as some personal information. Please follow the directions to create your username and password.     Your access code is: [email or web address]  Expires: 2/10/2019 12:08 PM     Your access code will  in 90 days. If you need help or a new code, please call your [facility] or [phone].        Care EveryWhere ID     This is your Care EveryWhere ID. This could be used by other organizations to access your [place] " medical records  [id number]        Your Vitals Were     Pulse Temperature Respirations Pulse Oximetry BMI (Body Mass Index)       59 98.6  F (37  C) (Tympanic) 16 97% 34.76 kg/m2        Blood Pressure from Last 3 Encounters:   11/13/18 142/90   11/12/18 134/78   07/26/18 136/78    Weight from Last 3 Encounters:   11/13/18 232 lb (105.2 kg)   11/12/18 229 lb (103.9 kg)   07/26/18 229 lb (103.9 kg)              We Performed the Following     CBC with platelets     EKG 12-lead complete w/read - Clinics          Today's Medication Changes          These changes are accurate as of 11/13/18 11:59 PM.  If you have any questions, ask your nurse or doctor.               These medicines have changed or have updated prescriptions.        Dose/Directions    acyclovir 400 MG tablet   Commonly known as:  ZOVIRAX   This may have changed:    - when to take this  - reasons to take this  - additional instructions   Used for:  Recurrent herpes simplex        TAKE 1 TABLET(400 MG) BY MOUTH THREE TIMES DAILY   Quantity:  15 tablet   Refills:  3                Primary Care Provider Office Phone # Fax #    Aleshia Meléndez PA-C [phone]       [address] [place] [address]        Equal Access to Services     TERRI MATHUR AH: Hadii tor bear hadasho Soomaali, waaxda luqadaha, qaybta kaalmada adeegyada, keegan lomas. So [facility] [phone].    ATENCIÓN: Si habla español, tiene a nunes disposición servicios gratuitos de asistencia lingüística. Llame al [phone].    We comply with applicable federal civil rights laws and [place] laws. We do not discriminate on the basis of race, color, national origin, age, disability, sex, sexual orientation, or gender identity.            Thank you!     Thank you for choosing [facility]  for your care. Our goal is always to provide you with excellent care. Hearing back from our patients is one way we can  continue to improve our services. Please take a few minutes to complete the written survey that you may receive in the mail after your visit with us. Thank you!             Your Updated Medication List - Protect others around you: Learn how to safely use, store and throw away your medicines at www.disposemymeds.org.          This list is accurate as of 11/13/18 11:59 PM.  Always use your most recent med list.                   Brand Name Dispense Instructions for use Diagnosis    acyclovir 400 MG tablet    ZOVIRAX    15 tablet    TAKE 1 TABLET(400 MG) BY MOUTH THREE TIMES DAILY    Recurrent herpes simplex       ALEVE 220 MG tablet   Generic drug:  naproxen sodium      Take 1 tablet by mouth 2 times daily (with meals).        aspirin 81 MG tablet      Take 1 tablet by mouth daily.        atenolol 50 MG tablet    TENORMIN    90 tablet    TAKE 1 TABLET(50 MG) BY MOUTH DAILY    Essential hypertension with goal blood pressure less than 140/90       CENTRUM SILVER per tablet      Take 1 tablet by mouth daily.        clindamycin-benzoyl Per (Refr) 1.2-5 % Gel     45 g    Apply topically At Bedtime    Acne vulgaris       estradiol 0.075 MG/24HR BIW patch    VIVELLE-DOT    24 patch    APPLY 1 PATCH TO SKIN 2 TIMES PER WEEK    Symptomatic menopausal or female climacteric states       l-tyrosine 500 MG Tabs      Take 2 tablets by mouth daily.        omega-3 fatty acids 1200 MG capsule      Take 1 capsule by mouth daily.        PROBIOTIC DAILY PO           vitamin D 2000 units tablet     100 tablet    Take 2,000 Units by mouth daily    Vitamin D deficiency

## 2018-11-13 NOTE — PROGRESS NOTES
[facility] Clinic Consultation    CHIEF COMPLAINT:  Chief Complaint   Patient presents with     Consult     Atypical Papillary Lesions       HISTORY OF PRESENT ILLNESS:  Ila Pierre is a 63 year old female who is seen in consultation at the request of Dr. Meléndez for evaluation of new right breast mass.  This was noticed on screening mammogram.  Mother had lumpectomy well into her 9th decade and her sister had mastectomy at age 60.  Unclear of pathology on them.    REVIEW OF SYSTEMS:  Constitutional:  Negative for chills, fatigue, fever and weight change.  Neuro: No extremity, nor facial weakness  Psych:  No unexpected changes in mood  Eyes:  Negative for new vision problems.  ENT:  Negative for ENT pain.  Cardiovascular:  Negative for chest pain, palpitations.  Respiratory:  Negative for cough, dyspnea.  Gastrointestinal:  Negative for abdominal pain.     Musculoskeletal:  Negative for new arthralgias or myalgias.  Integumentary:  No masses or rashes    Past Medical History:   Diagnosis Date     Herpes labialis      Hypertension goal BP (blood pressure) < 140/90      Menopausal symptoms        Past Surgical History:   Procedure Laterality Date     COLONOSCOPY  8-21-08     HC TOOTH EXTRACTION W/FORCEP  1973    wisdom teeth     HYSTERECTOMY  2005    for cyst and endometiosis     HYSTERECTOMY, PAP NO LONGER INDICATED       LEG SURGERY  1975    lump removed     TONSILLECTOMY  1964     TUBAL LIGATION  1993       Family History has been reviewed.    Social History   Substance Use Topics     Smoking status: Former Smoker     Quit date: 10/19/1997     Smokeless tobacco: Never Used     Alcohol use Yes      Comment: 3 beer - 3 times a week       Patient Active Problem List   Diagnosis     Palpitations     Diverticula of colon     Essential hypertension with goal blood pressure less than 140/90     Symptomatic menopausal or female climacteric states     Non morbid obesity due to excess calories      "Advanced directives, counseling/discussion     Chronic obstructive pulmonary disease, unspecified COPD type (H)     Right foot pain       Allergies   Allergen Reactions     Codeine Sulfate Diarrhea     Penicillins        Current Outpatient Prescriptions   Medication Sig Dispense Refill     acyclovir (ZOVIRAX) 400 MG tablet TAKE 1 TABLET(400 MG) BY MOUTH THREE TIMES DAILY 15 tablet 3     aspirin 81 MG tablet Take 1 tablet by mouth daily.       atenolol (TENORMIN) 50 MG tablet TAKE 1 TABLET(50 MG) BY MOUTH DAILY 90 tablet 3     Cholecalciferol (VITAMIN D) 2000 UNITS tablet Take 2,000 Units by mouth daily 100 tablet 3     clindamycin-benzoyl Per, Refr, 1.2-5 % GEL Apply topically At Bedtime 45 g 2     estradiol (VIVELLE-DOT) 0.075 MG/24HR BIW patch APPLY 1 PATCH TO SKIN 2 TIMES PER WEEK 24 patch 3     l-tyrosine 500 MG TABS Take 2 tablets by mouth daily.       Multiple Vitamins-Minerals (CENTRUM SILVER) per tablet Take 1 tablet by mouth daily.       naproxen sodium (ALEVE) 220 MG tablet Take 1 tablet by mouth 2 times daily (with meals).       omega-3 fatty acids (FISH OIL) 1200 MG capsule Take 1 capsule by mouth daily.       Probiotic Product (PROBIOTIC DAILY PO)          Vitals: /78  Pulse 67  Ht 5' 8.5\" (1.74 m)  Wt 229 lb (103.9 kg)  SpO2 100%  BMI 34.31 kg/m2  BMI= Body mass index is 34.31 kg/(m^2).    EXAM:  GENERAL: healthy, alert and no distress     HEENT: moist mucus membranes, no scleral icterus,   CARDIOVASCULAR:  RRR, No JVD  NEURO:  Alert;  well oriented to time, place and person.  RESPIRATORY: non labored breathing  NECK: Neck supple. No noticeable masses.  No lymphadenopathy noted.  ABDOMEN/GI: soft,  EXTREMITIES: warm and well perfused, no edema  SKIN: No suspicious lesions or rashes  LYMPH: Normal axillary/cervical lymph nodes  BREAST: no palpable masses, no skin retraction.  Slight bruise at biopsy site.    LABS/Imaging: mammograms, and ultrasounds reviewed    ASSESSMENT:  Ila FOSTER" Ignacio suffers from 1. Breast mass, right    - US Breast Radioactive Seed Placement, 1St Lesion Right; Future  - US Breast Radioactive Seed Placement, Ea Addl Lesion; Future  - MA Breast Specimen Right OR; Future  - MA Post Procedure Right; Future  - NM Lymphoscintigraphy Injection only; Future      PLAN:  preop seeds localization, slnb, and bct of right breast    Ila Pierre understands the risk, benefits, hopeful outcomes, and possible complications, both in the short and in the long term.  All her questions answered, she will like to proceed with the propose procedure in the near future.    It is my pleasure to participate in the care of Ila Pierre. Thank you for this consultation.     If you have any questions please give me a call.    Best regards,  Adolfo Perales MD    Please route or send letter to:  Primary Care Provider (PCP), Referring Provider and Include Progress Note    Total time with patient visit: 45 minutes more than half spent in counseling, explanation of procedures and coordination of care.

## 2018-11-14 ENCOUNTER — TELEPHONE (OUTPATIENT)
Dept: SURGERY | Facility: CLINIC | Age: 63
End: 2018-11-14

## 2018-11-14 NOTE — H&P (VIEW-ONLY)
[facility]  [address]  [facility] [address]  [phone]  Dept: [phone]    PRE-OP EVALUATION:  Today's date: 2018    Ila Pierre (: 1955) presents for pre-operative evaluation assessment as requested by Dr. Adolfo Perales   She requires evaluation and anesthesia risk assessment prior to undergoing surgery/procedure for treatment of SEED LOCALIZATION RIGHT BREAST PARTIAL MASTECTOMY.    Proposed Surgery/ Procedure: SEED LOCALIZATION RIGHT BREAST PARTIAL MSTECTOMY  Date of Surgery/ Procedure: 18  Time of Surgery/ Procedure: 9:30 AM  Hospital/Surgical Facility: [facility]  Fax number for surgical facility:   Primary Physician: Aleshia Meléndez  Type of Anesthesia Anticipated: General    Patient has a Health Care Directive or Living Will:  YES     1. NO - Do you have a history of heart attack, stroke, stent, bypass or surgery on an artery in the head, neck, heart or legs?  2. NO - Do you ever have any pain or discomfort in your chest?  3. NO - Do you have a history of  Heart Failure?  4. NO - Are you troubled by shortness of breath when: walking on the level, up a slight hill or at night?  5. NO - Do you currently have a cold, bronchitis or other respiratory infection?  6. NO - Do you have a cough, shortness of breath or wheezing?  7. NO - Do you sometimes get pains in the calves of your legs when you walk?  8. NO - Do you or anyone in your family have previous history of blood clots?  9. NO - Do you or does anyone in your family have a serious bleeding problem such as prolonged bleeding following surgeries or cuts?  10. NO - Have you ever had problems with anemia or been told to take iron pills?  11. NO - Have you had any abnormal blood loss such as black, tarry or bloody stools, or abnormal vaginal bleeding?  12. NO - Have you ever had a blood transfusion?  13. YES - Have you or any of your relatives ever had problems  with anesthesia?-Nausea/vomiting, patient, occurred after procedure 10 years ago.    14. NO - Do you have sleep apnea, excessive snoring or daytime drowsiness?  15. NO - Do you have any prosthetic heart valves?  16. NO - Do you have prosthetic joints?  17. NO - Is there any chance that you may be pregnant?      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  Pathology inconclusive.        See problem list for active medical problems.  Problems all longstanding and stable, except as noted/documented.  See ROS for pertinent symptoms related to these conditions.                                                                                                                                                          .    MEDICAL HISTORY:     Patient Active Problem List    Diagnosis Date Noted     Breast mass, right 11/13/2018     Priority: Medium     Right foot pain 05/12/2017     Priority: Medium     Advanced directives, counseling/discussion 02/16/2015     Priority: Medium     Advance Care Planning:   ACP Review and Resources Provided:  Reviewed chart for advance care plan.  Ila Pierre has no plan or code status on file. Discussed available resources and provided with information. Pt states she has a HCD at home and will bring in a copy.    Added by Susan Isaac on 2/16/2015             Non morbid obesity due to excess calories 02/06/2015     Priority: Medium     Chronic obstructive pulmonary disease, unspecified COPD type (H) 02/05/2015     Priority: Medium     Symptomatic menopausal or female climacteric states 11/03/2014     Priority: Medium     Essential hypertension with goal blood pressure less than 140/90 08/20/2013     Priority: Medium     Diverticula of colon 03/22/2013     Priority: Medium     Palpitations 10/18/2012     Priority: Medium      Past Medical History:   Diagnosis Date     Herpes labialis      Hypertension goal BP  (blood pressure) < 140/90      Menopausal symptoms      Past Surgical History:   Procedure Laterality Date     COLONOSCOPY  8-21-08      TOOTH EXTRACTION W/FORCEP  1973    wisdom teeth     HYSTERECTOMY  2005    for cyst and endometiosis     HYSTERECTOMY, PAP NO LONGER INDICATED       LEG SURGERY  1975    lump removed     TONSILLECTOMY  1964     TUBAL LIGATION  1993     Current Outpatient Prescriptions   Medication Sig Dispense Refill     acyclovir (ZOVIRAX) 400 MG tablet TAKE 1 TABLET(400 MG) BY MOUTH THREE TIMES DAILY (Patient taking differently: as needed TAKE 1 TABLET(400 MG) BY MOUTH THREE TIMES DAILY) 15 tablet 3     atenolol (TENORMIN) 50 MG tablet TAKE 1 TABLET(50 MG) BY MOUTH DAILY 90 tablet 3     Cholecalciferol (VITAMIN D) 2000 UNITS tablet Take 2,000 Units by mouth daily 100 tablet 3     clindamycin-benzoyl Per, Refr, 1.2-5 % GEL Apply topically At Bedtime 45 g 2     estradiol (VIVELLE-DOT) 0.075 MG/24HR BIW patch APPLY 1 PATCH TO SKIN 2 TIMES PER WEEK 24 patch 3     l-tyrosine 500 MG TABS Take 2 tablets by mouth daily.       Multiple Vitamins-Minerals (CENTRUM SILVER) per tablet Take 1 tablet by mouth daily.       naproxen sodium (ALEVE) 220 MG tablet Take 1 tablet by mouth 2 times daily (with meals).       omega-3 fatty acids (FISH OIL) 1200 MG capsule Take 1 capsule by mouth daily.       Probiotic Product (PROBIOTIC DAILY PO)        aspirin 81 MG tablet Take 1 tablet by mouth daily.       OTC products: Aspirin stopped    Allergies   Allergen Reactions     Codeine Sulfate Diarrhea     Penicillins       Latex Allergy: NO    Social History   Substance Use Topics     Smoking status: Former Smoker     Quit date: 10/19/1997     Smokeless tobacco: Never Used     Alcohol use Yes      Comment: 3 beer - 3 times a week     History   Drug Use No       REVIEW OF SYSTEMS:   CONSTITUTIONAL: NEGATIVE for fever, chills, change in weight  INTEGUMENTARY/SKIN: NEGATIVE for worrisome rashes, moles or lesions  EYES:  NEGATIVE for vision changes or irritation  ENT/MOUTH: NEGATIVE for ear, mouth and throat problems  RESP: NEGATIVE for significant cough or SOB  BREAST: NEGATIVE for masses, tenderness or discharge  CV: NEGATIVE for chest pain, palpitations or peripheral edema  GI: NEGATIVE for nausea, abdominal pain, heartburn, or change in bowel habits  : NEGATIVE for frequency, dysuria, or hematuria  MUSCULOSKELETAL: NEGATIVE for significant arthralgias or myalgia  NEURO: NEGATIVE for weakness, dizziness or paresthesias  ENDOCRINE: NEGATIVE for temperature intolerance, skin/hair changes  HEME: NEGATIVE for bleeding problems  PSYCHIATRIC: NEGATIVE for changes in mood or affect    EXAM:   /90  Pulse 59  Temp 98.6  F (37  C) (Tympanic)  Resp 16  Wt 232 lb (105.2 kg)  SpO2 97%  BMI 34.76 kg/m2    GENERAL APPEARANCE: healthy, alert and no distress     EYES: EOMI, PERRL     HENT: ear canals and TM's normal and nose and mouth without ulcers or lesions     NECK: no adenopathy, no asymmetry, masses, or scars and thyroid normal to palpation     RESP: lungs clear to auscultation - no rales, rhonchi or wheezes     CV: regular rates and rhythm, normal S1 S2, no S3 or S4 and no murmur, click or rub     ABDOMEN:  soft, nontender, no HSM or masses and bowel sounds normal     MS: extremities normal- no gross deformities noted, no evidence of inflammation in joints, FROM in all extremities.     SKIN: no suspicious lesions or rashes     NEURO: Normal strength and tone, sensory exam grossly normal, mentation intact and speech normal     PSYCH: mentation appears normal. and affect normal/bright     LYMPHATICS: No cervical adenopathy    DIAGNOSTICS:     EKG: sinus bradycardia, normal axis, normal intervals, no acute ST/T changes c/w ischemia, no LVH by voltage criteria, there are no prior tracings available  Labs Resulted Today:   Results for orders placed or performed in visit on 11/13/18   CBC with platelets   Result Value Ref Range     WBC 7.5 4.0 - 11.0 10e9/L    RBC Count 4.78 3.8 - 5.2 10e12/L    Hemoglobin 15.2 11.7 - 15.7 g/dL    Hematocrit 46.8 35.0 - 47.0 %    MCV 98 78 - 100 fl    MCH 31.8 26.5 - 33.0 pg    MCHC 32.5 31.5 - 36.5 g/dL    RDW 14.1 10.0 - 15.0 %    Platelet Count 337 150 - 450 10e9/L       Recent Labs   Lab Test  07/26/18   0809  10/23/12   0917  06/07/11   1332   HGB   --   14.6  15.4   NA  139  138  136   POTASSIUM  4.1  5.0  4.2   CR  0.72  0.90  0.73        IMPRESSION:   Reason for surgery/procedure: Abnormal breast biospy  Diagnosis/reason for consult: Evaluation and anesthesia risk assessment prior to SEED LOCALIZATION RIGHT BREAST PARTIAL MASTECTOMY      The proposed surgical procedure is considered LOW risk.    REVISED CARDIAC RISK INDEX  The patient has the following serious cardiovascular risks for perioperative complications such as (MI, PE, VFib and 3  AV Block):  No serious cardiac risks  INTERPRETATION: 0 risks: Class I (very low risk - 0.4% complication rate)    The patient has the following additional risks for perioperative complications:  No identified additional risks      ICD-10-CM    1. Preop general physical exam Z01.818 CBC with platelets     EKG 12-lead complete w/read - Clinics   2. Breast mass, right N63.10        RECOMMENDATIONS:       --Patient is to take all scheduled medications on the day of surgery EXCEPT for modifications listed below.    Anticoagulant or Antiplatelet Medication Use  ASPIRIN: Discontinue ASA 7-10 days prior to procedure to reduce bleeding risk.  It should be resumed post-operatively.  NSAIDS: Naproxen (Naprosyn):   Stop 2-3 days prior to surgery      Last surgery, they had trouble getting her IV placed and pt has history of postop nausea and vomiting.    APPROVAL GIVEN to proceed with proposed procedure, without further diagnostic evaluation       Signed Electronically by: Aleshia Meléndez PA-C    Copy of this evaluation report is provided to requesting  physician.    [facility] Preop Guidelines    Revised Cardiac Risk Index

## 2018-11-14 NOTE — TELEPHONE ENCOUNTER
Ila is scheduled for seed localized, right breast partial mastectomy, right sentinel lymph node biopsy on 11/16/2018 with Dr. Perales. Pre-procedure call placed to patient. All questions answered appropriately and thoroughly. Informed patient to wear comfortable clothing morning of procedure, including a supportive bra, preferably one that clasps in the front and without under wire. Patient verbalized understanding.    Trina GAON, RN, OCN  Oncology Care Coordinator  [facility]  [phone]

## 2018-11-15 DIAGNOSIS — N63.10 BREAST MASS, RIGHT: Primary | ICD-10-CM

## 2018-11-18 ENCOUNTER — HOSPITAL ENCOUNTER (OUTPATIENT)
Dept: MRI IMAGING | Facility: CLINIC | Age: 63
Discharge: HOME | End: 2018-11-18
Attending: SURGERY | Admitting: SURGERY
Payer: COMMERCIAL

## 2018-11-18 DIAGNOSIS — N63.10 BREAST MASS, RIGHT: ICD-10-CM

## 2018-11-18 PROCEDURE — 25500064 ZZH RX 255 OP 636: Performed by: SURGERY

## 2018-11-18 PROCEDURE — 77059 MR BREAST BILATERAL W/O & W CONTRAST: CPT

## 2018-11-18 PROCEDURE — A9585 GADOBUTROL INJECTION: HCPCS | Performed by: SURGERY

## 2018-11-18 RX ORDER — GADOBUTROL 604.72 MG/ML
11 INJECTION INTRAVENOUS ONCE
Status: COMPLETED | OUTPATIENT
Start: 2018-11-18 | End: 2018-11-18

## 2018-11-18 RX ADMIN — GADOBUTROL 11 ML: 604.72 INJECTION INTRAVENOUS at 07:45

## 2018-11-20 ENCOUNTER — TELEPHONE (OUTPATIENT)
Dept: SURGERY | Facility: CLINIC | Age: 63
End: 2018-11-20

## 2018-11-20 DIAGNOSIS — N63.10 BREAST MASS, RIGHT: Primary | ICD-10-CM

## 2018-11-20 NOTE — TELEPHONE ENCOUNTER
Name of caller: Patient    Reason for Call:  MRI results, updated phone number,  Would be available any time    Surgeon:  Dr. Perales    Recent Surgery:  No    If yes, when & what type:  N/A      Best phone number to reach pt at is: [phone]  Ok to leave a message with medical info? Yes.    Pharmacy preferred (if calling for a refill):

## 2018-11-20 NOTE — TELEPHONE ENCOUNTER
F/U call placed to patient. Per Dr. Perales, will proceed with U/S of right breast and possible biopsy to define extent of disease. If unable to see lesion on U/S proceed with MRI guided biopsy of lesion. Patient verbalized understanding. Orders entered. Await call from scheduling. Both parties in agreement of plan.    Trina GAON, RN, OCN  Oncology Care Coordinator  [facility]  [phone]

## 2018-11-20 NOTE — TELEPHONE ENCOUNTER
Paged to ELVER.    Trina Wilcox BSN, RN, OCN  Oncology Care Coordinator  [facility]  [phone]

## 2018-11-26 ENCOUNTER — HOSPITAL ENCOUNTER (OUTPATIENT)
Dept: MAMMOGRAPHY | Facility: CLINIC | Age: 63
Discharge: HOME | End: 2018-11-26
Attending: SURGERY | Admitting: SURGERY
Payer: COMMERCIAL

## 2018-11-26 ENCOUNTER — HOSPITAL ENCOUNTER (OUTPATIENT)
Dept: MAMMOGRAPHY | Facility: CLINIC | Age: 63
End: 2018-11-26
Attending: SURGERY
Payer: COMMERCIAL

## 2018-11-26 DIAGNOSIS — N63.10 BREAST MASS, RIGHT: ICD-10-CM

## 2018-11-26 PROCEDURE — 25000125 ZZHC RX 250: Performed by: SURGERY

## 2018-11-26 PROCEDURE — 88305 TISSUE EXAM BY PATHOLOGIST: CPT | Performed by: SURGERY

## 2018-11-26 PROCEDURE — 88342 IMHCHEM/IMCYTCHM 1ST ANTB: CPT | Mod: 26 | Performed by: SURGERY

## 2018-11-26 PROCEDURE — 88341 IMHCHEM/IMCYTCHM EA ADD ANTB: CPT | Performed by: SURGERY

## 2018-11-26 PROCEDURE — 88360 TUMOR IMMUNOHISTOCHEM/MANUAL: CPT | Performed by: SURGERY

## 2018-11-26 PROCEDURE — 88377 M/PHMTRC ALYS ISHQUANT/SEMIQ: CPT | Performed by: PATHOLOGY

## 2018-11-26 PROCEDURE — 88360 TUMOR IMMUNOHISTOCHEM/MANUAL: CPT | Mod: 26 | Performed by: SURGERY

## 2018-11-26 PROCEDURE — 88360 TUMOR IMMUNOHISTOCHEM/MANUAL: CPT | Mod: 26,59 | Performed by: SURGERY

## 2018-11-26 PROCEDURE — 76642 ULTRASOUND BREAST LIMITED: CPT | Mod: RT

## 2018-11-26 PROCEDURE — 88341 IMHCHEM/IMCYTCHM EA ADD ANTB: CPT | Mod: 26 | Performed by: SURGERY

## 2018-11-26 PROCEDURE — 19083 BX BREAST 1ST LESION US IMAG: CPT | Mod: RT

## 2018-11-26 PROCEDURE — 88342 IMHCHEM/IMCYTCHM 1ST ANTB: CPT | Performed by: SURGERY

## 2018-11-26 PROCEDURE — 40000986 MA POST PROCEDURE RIGHT

## 2018-11-26 PROCEDURE — 88305 TISSUE EXAM BY PATHOLOGIST: CPT | Mod: 26 | Performed by: SURGERY

## 2018-11-26 PROCEDURE — 00000159 ZZHCL STATISTIC H-SEND OUTS PREP: Performed by: SURGERY

## 2018-11-26 RX ADMIN — LIDOCAINE HYDROCHLORIDE 5 ML: 10 INJECTION, SOLUTION INFILTRATION; PERINEURAL at 14:19

## 2018-11-26 NOTE — DISCHARGE INSTRUCTIONS

## 2018-11-28 LAB — COPATH REPORT: NORMAL

## 2018-11-29 ENCOUNTER — TELEPHONE (OUTPATIENT)
Dept: SURGERY | Facility: CLINIC | Age: 63
End: 2018-11-29

## 2018-11-29 NOTE — TELEPHONE ENCOUNTER
Winnie notified: Scheduled for plastic surgery consult with Dr. Rodríguez on 12/3/2018 check in at 8 am at [facility]. Winnie verbalized understanding.     Trina GAON, RN, OCN  Oncology Care Coordinator  [facility]  [phone]

## 2018-11-30 LAB — COPATH REPORT: NORMAL

## 2018-12-03 ENCOUNTER — TELEPHONE (OUTPATIENT)
Dept: FAMILY MEDICINE | Facility: CLINIC | Age: 63
End: 2018-12-03

## 2018-12-03 NOTE — TELEPHONE ENCOUNTER
Dari from surgery center called requesting addendum made to 11/13/18 OV. It is 1 day past the 30 days. Surgery is scheduled for 12/14/18. Note to be along the lines: okay to proceed with surgery.

## 2018-12-05 ENCOUNTER — TELEPHONE (OUTPATIENT)
Dept: SURGERY | Facility: CLINIC | Age: 63
End: 2018-12-05

## 2018-12-05 DIAGNOSIS — C50.911 MALIGNANT NEOPLASM OF RIGHT BREAST IN FEMALE, ESTROGEN RECEPTOR POSITIVE, UNSPECIFIED SITE OF BREAST (H): Primary | ICD-10-CM

## 2018-12-05 DIAGNOSIS — Z17.0 MALIGNANT NEOPLASM OF RIGHT BREAST IN FEMALE, ESTROGEN RECEPTOR POSITIVE, UNSPECIFIED SITE OF BREAST (H): Primary | ICD-10-CM

## 2018-12-05 NOTE — TELEPHONE ENCOUNTER
Winnie called requesting rx for post mastectomy garment to be faxed to [facility]. Patient does have appointment there this Friday, December 7th.    Rx sent per patient request.    Trina GAON, RN, OCN  Oncology Care Coordinator  [facility]  [phone]

## 2018-12-06 ENCOUNTER — TELEPHONE (OUTPATIENT)
Dept: SURGERY | Facility: CLINIC | Age: 63
End: 2018-12-06

## 2018-12-06 NOTE — TELEPHONE ENCOUNTER
Type of surgery: Right mastectomy with right sentinel lymph node biopsy, possible right axillary node dissection  Location of surgery: [facility]  Date and time of surgery: 12/14/18 at 11am  Surgeon: Dr. Adolfo Perales  Pre-Op Appt Date: patient to schedule  Post-Op Appt Date: patient to schedule   Packet sent out: Yes  Pre-cert/Authorization completed:  Not Applicable  Date: 12/4/18

## 2018-12-14 ENCOUNTER — TRANSFERRED RECORDS (OUTPATIENT)
Dept: HEALTH INFORMATION MANAGEMENT | Facility: CLINIC | Age: 63
End: 2018-12-14

## 2018-12-14 ENCOUNTER — ANESTHESIA EVENT (OUTPATIENT)
Dept: SURGERY | Facility: CLINIC | Age: 63
End: 2018-12-14
Payer: COMMERCIAL

## 2018-12-14 ENCOUNTER — ANESTHESIA (OUTPATIENT)
Dept: SURGERY | Facility: CLINIC | Age: 63
End: 2018-12-14
Payer: COMMERCIAL

## 2018-12-14 ENCOUNTER — HOSPITAL ENCOUNTER (OUTPATIENT)
Facility: CLINIC | Age: 63
Discharge: HOME | End: 2018-12-15
Attending: SURGERY | Admitting: SURGERY
Payer: COMMERCIAL

## 2018-12-14 ENCOUNTER — APPOINTMENT (OUTPATIENT)
Dept: SURGERY | Facility: PHYSICIAN GROUP | Age: 63
End: 2018-12-14
Payer: COMMERCIAL

## 2018-12-14 ENCOUNTER — HOSPITAL ENCOUNTER (OUTPATIENT)
Dept: NUCLEAR MEDICINE | Facility: CLINIC | Age: 63
Setting detail: NUCLEAR MEDICINE
End: 2018-12-14
Attending: SURGERY
Payer: COMMERCIAL

## 2018-12-14 DIAGNOSIS — C50.911 MALIGNANT NEOPLASM OF RIGHT FEMALE BREAST, UNSPECIFIED ESTROGEN RECEPTOR STATUS, UNSPECIFIED SITE OF BREAST (H): Primary | ICD-10-CM

## 2018-12-14 DIAGNOSIS — N63.10 BREAST MASS, RIGHT: ICD-10-CM

## 2018-12-14 LAB
CREAT SERPL-MCNC: 0.6 MG/DL (ref 0.52–1.04)
GFR SERPL CREATININE-BSD FRML MDRD: >90 ML/MIN/1.7M2
POTASSIUM SERPL-SCNC: 4 MMOL/L (ref 3.4–5.3)

## 2018-12-14 PROCEDURE — 27210794 ZZH OR GENERAL SUPPLY STERILE: Performed by: SURGERY

## 2018-12-14 PROCEDURE — 36000056 ZZH SURGERY LEVEL 3 1ST 30 MIN: Performed by: SURGERY

## 2018-12-14 PROCEDURE — 25000128 H RX IP 250 OP 636: Performed by: NURSE ANESTHETIST, CERTIFIED REGISTERED

## 2018-12-14 PROCEDURE — 84132 ASSAY OF SERUM POTASSIUM: CPT | Performed by: ANESTHESIOLOGY

## 2018-12-14 PROCEDURE — 25000128 H RX IP 250 OP 636: Performed by: SURGERY

## 2018-12-14 PROCEDURE — 88331 PATH CONSLTJ SURG 1 BLK 1SPC: CPT | Mod: 26 | Performed by: SURGERY

## 2018-12-14 PROCEDURE — 82565 ASSAY OF CREATININE: CPT | Performed by: ANESTHESIOLOGY

## 2018-12-14 PROCEDURE — 19303 MAST SIMPLE COMPLETE: CPT | Mod: AS | Performed by: PHYSICIAN ASSISTANT

## 2018-12-14 PROCEDURE — 38792 RA TRACER ID OF SENTINL NODE: CPT

## 2018-12-14 PROCEDURE — 25000125 ZZHC RX 250: Performed by: SURGERY

## 2018-12-14 PROCEDURE — 37000008 ZZH ANESTHESIA TECHNICAL FEE, 1ST 30 MIN: Performed by: SURGERY

## 2018-12-14 PROCEDURE — 25000128 H RX IP 250 OP 636: Performed by: PLASTIC SURGERY

## 2018-12-14 PROCEDURE — A9520 TC99 TILMANOCEPT DIAG 0.5MCI: HCPCS | Performed by: SURGERY

## 2018-12-14 PROCEDURE — 40000170 ZZH STATISTIC PRE-PROCEDURE ASSESSMENT II: Performed by: SURGERY

## 2018-12-14 PROCEDURE — 25000125 ZZHC RX 250: Performed by: PLASTIC SURGERY

## 2018-12-14 PROCEDURE — 25000125 ZZHC RX 250: Performed by: NURSE ANESTHETIST, CERTIFIED REGISTERED

## 2018-12-14 PROCEDURE — 36000058 ZZH SURGERY LEVEL 3 EA 15 ADDTL MIN: Performed by: SURGERY

## 2018-12-14 PROCEDURE — 88307 TISSUE EXAM BY PATHOLOGIST: CPT | Performed by: SURGERY

## 2018-12-14 PROCEDURE — 36415 COLL VENOUS BLD VENIPUNCTURE: CPT | Performed by: ANESTHESIOLOGY

## 2018-12-14 PROCEDURE — 88331 PATH CONSLTJ SURG 1 BLK 1SPC: CPT | Mod: 91 | Performed by: SURGERY

## 2018-12-14 PROCEDURE — 25000132 ZZH RX MED GY IP 250 OP 250 PS 637: Performed by: PLASTIC SURGERY

## 2018-12-14 PROCEDURE — 88307 TISSUE EXAM BY PATHOLOGIST: CPT | Mod: 26 | Performed by: SURGERY

## 2018-12-14 PROCEDURE — 25000128 H RX IP 250 OP 636: Performed by: ANESTHESIOLOGY

## 2018-12-14 PROCEDURE — 37000009 ZZH ANESTHESIA TECHNICAL FEE, EACH ADDTL 15 MIN: Performed by: SURGERY

## 2018-12-14 PROCEDURE — 25000125 ZZHC RX 250: Performed by: ANESTHESIOLOGY

## 2018-12-14 PROCEDURE — 34300033 ZZH RX 343: Performed by: SURGERY

## 2018-12-14 PROCEDURE — 38525 BIOPSY/REMOVAL LYMPH NODES: CPT | Mod: 51 | Performed by: SURGERY

## 2018-12-14 PROCEDURE — 27810169 ZZH OR IMPLANT GENERAL: Performed by: SURGERY

## 2018-12-14 PROCEDURE — 71000012 ZZH RECOVERY PHASE 1 LEVEL 1 FIRST HR: Performed by: SURGERY

## 2018-12-14 PROCEDURE — 19303 MAST SIMPLE COMPLETE: CPT | Performed by: SURGERY

## 2018-12-14 DEVICE — IMPLANTABLE DEVICE: Type: IMPLANTABLE DEVICE | Site: BREAST | Status: FUNCTIONAL

## 2018-12-14 RX ORDER — CLINDAMYCIN PHOSPHATE 900 MG/50ML
900 INJECTION, SOLUTION INTRAVENOUS
Status: COMPLETED | OUTPATIENT
Start: 2018-12-14 | End: 2018-12-14

## 2018-12-14 RX ORDER — ACETAMINOPHEN 325 MG/1
650 TABLET ORAL EVERY 4 HOURS PRN
Status: DISCONTINUED | OUTPATIENT
Start: 2018-12-17 | End: 2018-12-15 | Stop reason: HOSPADM

## 2018-12-14 RX ORDER — SODIUM CHLORIDE, SODIUM LACTATE, POTASSIUM CHLORIDE, CALCIUM CHLORIDE 600; 310; 30; 20 MG/100ML; MG/100ML; MG/100ML; MG/100ML
INJECTION, SOLUTION INTRAVENOUS CONTINUOUS
Status: DISCONTINUED | OUTPATIENT
Start: 2018-12-14 | End: 2018-12-14 | Stop reason: HOSPADM

## 2018-12-14 RX ORDER — LIDOCAINE 40 MG/G
CREAM TOPICAL
Status: DISCONTINUED | OUTPATIENT
Start: 2018-12-14 | End: 2018-12-15 | Stop reason: HOSPADM

## 2018-12-14 RX ORDER — FENTANYL CITRATE 50 UG/ML
25-50 INJECTION, SOLUTION INTRAMUSCULAR; INTRAVENOUS
Status: DISCONTINUED | OUTPATIENT
Start: 2018-12-14 | End: 2018-12-14 | Stop reason: HOSPADM

## 2018-12-14 RX ORDER — KETAMINE HYDROCHLORIDE 10 MG/ML
INJECTION, SOLUTION INTRAMUSCULAR; INTRAVENOUS PRN
Status: DISCONTINUED | OUTPATIENT
Start: 2018-12-14 | End: 2018-12-14

## 2018-12-14 RX ORDER — CLINDAMYCIN PHOSPHATE 900 MG/50ML
900 INJECTION, SOLUTION INTRAVENOUS
Status: DISCONTINUED | OUTPATIENT
Start: 2018-12-14 | End: 2018-12-14 | Stop reason: HOSPADM

## 2018-12-14 RX ORDER — METOCLOPRAMIDE HYDROCHLORIDE 5 MG/ML
10 INJECTION INTRAMUSCULAR; INTRAVENOUS EVERY 6 HOURS PRN
Status: DISCONTINUED | OUTPATIENT
Start: 2018-12-14 | End: 2018-12-15 | Stop reason: HOSPADM

## 2018-12-14 RX ORDER — METHOCARBAMOL 750 MG/1
750 TABLET, FILM COATED ORAL EVERY 6 HOURS PRN
Status: DISCONTINUED | OUTPATIENT
Start: 2018-12-14 | End: 2018-12-15 | Stop reason: HOSPADM

## 2018-12-14 RX ORDER — ACETAMINOPHEN 325 MG/1
975 TABLET ORAL EVERY 8 HOURS
Status: DISCONTINUED | OUTPATIENT
Start: 2018-12-14 | End: 2018-12-15 | Stop reason: HOSPADM

## 2018-12-14 RX ORDER — PROPOFOL 10 MG/ML
INJECTION, EMULSION INTRAVENOUS PRN
Status: DISCONTINUED | OUTPATIENT
Start: 2018-12-14 | End: 2018-12-14

## 2018-12-14 RX ORDER — ATENOLOL 50 MG/1
50 TABLET ORAL EVERY EVENING
Status: DISCONTINUED | OUTPATIENT
Start: 2018-12-14 | End: 2018-12-15 | Stop reason: HOSPADM

## 2018-12-14 RX ORDER — ONDANSETRON 4 MG/1
4 TABLET, ORALLY DISINTEGRATING ORAL EVERY 6 HOURS PRN
Status: DISCONTINUED | OUTPATIENT
Start: 2018-12-14 | End: 2018-12-15 | Stop reason: HOSPADM

## 2018-12-14 RX ORDER — METOCLOPRAMIDE 5 MG/1
10 TABLET ORAL EVERY 6 HOURS PRN
Status: DISCONTINUED | OUTPATIENT
Start: 2018-12-14 | End: 2018-12-15 | Stop reason: HOSPADM

## 2018-12-14 RX ORDER — ONDANSETRON 2 MG/ML
4 INJECTION INTRAMUSCULAR; INTRAVENOUS EVERY 30 MIN PRN
Status: DISCONTINUED | OUTPATIENT
Start: 2018-12-14 | End: 2018-12-14 | Stop reason: HOSPADM

## 2018-12-14 RX ORDER — NALOXONE HYDROCHLORIDE 0.4 MG/ML
.1-.4 INJECTION, SOLUTION INTRAMUSCULAR; INTRAVENOUS; SUBCUTANEOUS
Status: DISCONTINUED | OUTPATIENT
Start: 2018-12-14 | End: 2018-12-15 | Stop reason: HOSPADM

## 2018-12-14 RX ORDER — SODIUM CHLORIDE, SODIUM LACTATE, POTASSIUM CHLORIDE, CALCIUM CHLORIDE 600; 310; 30; 20 MG/100ML; MG/100ML; MG/100ML; MG/100ML
INJECTION, SOLUTION INTRAVENOUS CONTINUOUS
Status: DISCONTINUED | OUTPATIENT
Start: 2018-12-14 | End: 2018-12-15 | Stop reason: HOSPADM

## 2018-12-14 RX ORDER — HYDROMORPHONE HYDROCHLORIDE 1 MG/ML
.3-.5 INJECTION, SOLUTION INTRAMUSCULAR; INTRAVENOUS; SUBCUTANEOUS EVERY 10 MIN PRN
Status: DISCONTINUED | OUTPATIENT
Start: 2018-12-14 | End: 2018-12-14 | Stop reason: HOSPADM

## 2018-12-14 RX ORDER — ONDANSETRON 2 MG/ML
INJECTION INTRAMUSCULAR; INTRAVENOUS PRN
Status: DISCONTINUED | OUTPATIENT
Start: 2018-12-14 | End: 2018-12-14

## 2018-12-14 RX ORDER — CLINDAMYCIN PHOSPHATE 900 MG/50ML
900 INJECTION, SOLUTION INTRAVENOUS SEE ADMIN INSTRUCTIONS
Status: DISCONTINUED | OUTPATIENT
Start: 2018-12-14 | End: 2018-12-14 | Stop reason: HOSPADM

## 2018-12-14 RX ORDER — DEXAMETHASONE SODIUM PHOSPHATE 4 MG/ML
INJECTION, SOLUTION INTRA-ARTICULAR; INTRALESIONAL; INTRAMUSCULAR; INTRAVENOUS; SOFT TISSUE PRN
Status: DISCONTINUED | OUTPATIENT
Start: 2018-12-14 | End: 2018-12-14

## 2018-12-14 RX ORDER — EPHEDRINE SULFATE 50 MG/ML
INJECTION, SOLUTION INTRAMUSCULAR; INTRAVENOUS; SUBCUTANEOUS PRN
Status: DISCONTINUED | OUTPATIENT
Start: 2018-12-14 | End: 2018-12-14

## 2018-12-14 RX ORDER — ONDANSETRON 4 MG/1
4 TABLET, ORALLY DISINTEGRATING ORAL EVERY 30 MIN PRN
Status: DISCONTINUED | OUTPATIENT
Start: 2018-12-14 | End: 2018-12-14 | Stop reason: HOSPADM

## 2018-12-14 RX ORDER — MAGNESIUM HYDROXIDE 1200 MG/15ML
LIQUID ORAL PRN
Status: DISCONTINUED | OUTPATIENT
Start: 2018-12-14 | End: 2018-12-14 | Stop reason: HOSPADM

## 2018-12-14 RX ORDER — ONDANSETRON 2 MG/ML
4 INJECTION INTRAMUSCULAR; INTRAVENOUS EVERY 6 HOURS PRN
Status: DISCONTINUED | OUTPATIENT
Start: 2018-12-14 | End: 2018-12-15 | Stop reason: HOSPADM

## 2018-12-14 RX ORDER — FENTANYL CITRATE 50 UG/ML
INJECTION, SOLUTION INTRAMUSCULAR; INTRAVENOUS PRN
Status: DISCONTINUED | OUTPATIENT
Start: 2018-12-14 | End: 2018-12-14

## 2018-12-14 RX ORDER — OXYCODONE HYDROCHLORIDE 5 MG/1
5-10 TABLET ORAL
Status: DISCONTINUED | OUTPATIENT
Start: 2018-12-14 | End: 2018-12-15 | Stop reason: HOSPADM

## 2018-12-14 RX ORDER — SCOLOPAMINE TRANSDERMAL SYSTEM 1 MG/1
1 PATCH, EXTENDED RELEASE TRANSDERMAL
Status: DISCONTINUED | OUTPATIENT
Start: 2018-12-14 | End: 2018-12-15 | Stop reason: HOSPADM

## 2018-12-14 RX ORDER — PROPOFOL 10 MG/ML
INJECTION, EMULSION INTRAVENOUS CONTINUOUS PRN
Status: DISCONTINUED | OUTPATIENT
Start: 2018-12-14 | End: 2018-12-14

## 2018-12-14 RX ORDER — BUPIVACAINE HYDROCHLORIDE 5 MG/ML
INJECTION, SOLUTION PERINEURAL PRN
Status: DISCONTINUED | OUTPATIENT
Start: 2018-12-14 | End: 2018-12-14 | Stop reason: HOSPADM

## 2018-12-14 RX ORDER — MEPERIDINE HYDROCHLORIDE 25 MG/ML
12.5 INJECTION INTRAMUSCULAR; INTRAVENOUS; SUBCUTANEOUS
Status: DISCONTINUED | OUTPATIENT
Start: 2018-12-14 | End: 2018-12-14 | Stop reason: HOSPADM

## 2018-12-14 RX ORDER — CLINDAMYCIN PHOSPHATE 900 MG/50ML
900 INJECTION, SOLUTION INTRAVENOUS EVERY 8 HOURS
Status: DISCONTINUED | OUTPATIENT
Start: 2018-12-14 | End: 2018-12-15 | Stop reason: HOSPADM

## 2018-12-14 RX ORDER — NALOXONE HYDROCHLORIDE 0.4 MG/ML
.1-.4 INJECTION, SOLUTION INTRAMUSCULAR; INTRAVENOUS; SUBCUTANEOUS
Status: DISCONTINUED | OUTPATIENT
Start: 2018-12-14 | End: 2018-12-14 | Stop reason: HOSPADM

## 2018-12-14 RX ORDER — MORPHINE SULFATE 2 MG/ML
2-4 INJECTION, SOLUTION INTRAMUSCULAR; INTRAVENOUS
Status: DISCONTINUED | OUTPATIENT
Start: 2018-12-14 | End: 2018-12-15 | Stop reason: HOSPADM

## 2018-12-14 RX ORDER — LIDOCAINE HYDROCHLORIDE 20 MG/ML
INJECTION, SOLUTION INFILTRATION; PERINEURAL PRN
Status: DISCONTINUED | OUTPATIENT
Start: 2018-12-14 | End: 2018-12-14

## 2018-12-14 RX ORDER — PROCHLORPERAZINE MALEATE 10 MG
10 TABLET ORAL EVERY 6 HOURS PRN
Status: DISCONTINUED | OUTPATIENT
Start: 2018-12-14 | End: 2018-12-15 | Stop reason: HOSPADM

## 2018-12-14 RX ADMIN — KETAMINE HCL-NACL SOLN PREF SY 50 MG/5ML-0.9% (10MG/ML) 30 MG: 10 SOLUTION PREFILLED SYRINGE at 11:32

## 2018-12-14 RX ADMIN — Medication 10 MG: at 11:41

## 2018-12-14 RX ADMIN — FENTANYL CITRATE 25 MCG: 50 INJECTION, SOLUTION INTRAMUSCULAR; INTRAVENOUS at 12:16

## 2018-12-14 RX ADMIN — CLINDAMYCIN IN 5 PERCENT DEXTROSE 900 MG: 18 INJECTION, SOLUTION INTRAVENOUS at 18:45

## 2018-12-14 RX ADMIN — ACETAMINOPHEN 975 MG: 325 TABLET, FILM COATED ORAL at 17:57

## 2018-12-14 RX ADMIN — CLINDAMYCIN PHOSPHATE 900 MG: 18 INJECTION, SOLUTION INTRAVENOUS at 11:29

## 2018-12-14 RX ADMIN — ONDANSETRON 4 MG: 2 INJECTION INTRAMUSCULAR; INTRAVENOUS at 14:51

## 2018-12-14 RX ADMIN — SODIUM CHLORIDE, POTASSIUM CHLORIDE, SODIUM LACTATE AND CALCIUM CHLORIDE: 600; 310; 30; 20 INJECTION, SOLUTION INTRAVENOUS at 10:48

## 2018-12-14 RX ADMIN — HYDROMORPHONE HYDROCHLORIDE 0.5 MG: 1 INJECTION, SOLUTION INTRAMUSCULAR; INTRAVENOUS; SUBCUTANEOUS at 13:04

## 2018-12-14 RX ADMIN — PROPOFOL 200 MG: 10 INJECTION, EMULSION INTRAVENOUS at 11:23

## 2018-12-14 RX ADMIN — MIDAZOLAM 2 MG: 1 INJECTION INTRAMUSCULAR; INTRAVENOUS at 11:08

## 2018-12-14 RX ADMIN — DEXMEDETOMIDINE HYDROCHLORIDE 0.7 MCG/KG/HR: 100 INJECTION, SOLUTION INTRAVENOUS at 11:24

## 2018-12-14 RX ADMIN — TILMANOCEPT 0.54 MILLICURIE: KIT at 10:10

## 2018-12-14 RX ADMIN — FENTANYL CITRATE 75 MCG: 50 INJECTION, SOLUTION INTRAMUSCULAR; INTRAVENOUS at 12:44

## 2018-12-14 RX ADMIN — DEXAMETHASONE SODIUM PHOSPHATE 4 MG: 4 INJECTION, SOLUTION INTRA-ARTICULAR; INTRALESIONAL; INTRAMUSCULAR; INTRAVENOUS; SOFT TISSUE at 11:38

## 2018-12-14 RX ADMIN — SODIUM CHLORIDE, POTASSIUM CHLORIDE, SODIUM LACTATE AND CALCIUM CHLORIDE: 600; 310; 30; 20 INJECTION, SOLUTION INTRAVENOUS at 16:47

## 2018-12-14 RX ADMIN — FENTANYL CITRATE 100 MCG: 50 INJECTION, SOLUTION INTRAMUSCULAR; INTRAVENOUS at 11:43

## 2018-12-14 RX ADMIN — KETAMINE HCL-NACL SOLN PREF SY 50 MG/5ML-0.9% (10MG/ML) 20 MG: 10 SOLUTION PREFILLED SYRINGE at 13:56

## 2018-12-14 RX ADMIN — Medication 10 MG: at 11:51

## 2018-12-14 RX ADMIN — HYDROMORPHONE HYDROCHLORIDE 0.5 MG: 1 INJECTION, SOLUTION INTRAMUSCULAR; INTRAVENOUS; SUBCUTANEOUS at 16:19

## 2018-12-14 RX ADMIN — PROPOFOL 150 MCG/KG/MIN: 10 INJECTION, EMULSION INTRAVENOUS at 11:23

## 2018-12-14 RX ADMIN — FENTANYL CITRATE 50 MCG: 50 INJECTION, SOLUTION INTRAMUSCULAR; INTRAVENOUS at 11:20

## 2018-12-14 RX ADMIN — OXYCODONE HYDROCHLORIDE 5 MG: 5 TABLET ORAL at 20:21

## 2018-12-14 RX ADMIN — SODIUM CHLORIDE, POTASSIUM CHLORIDE, SODIUM LACTATE AND CALCIUM CHLORIDE: 600; 310; 30; 20 INJECTION, SOLUTION INTRAVENOUS at 18:00

## 2018-12-14 RX ADMIN — SCOLOPAMINE TRANSDERMAL SYSTEM 1 PATCH: 1 PATCH, EXTENDED RELEASE TRANSDERMAL at 10:53

## 2018-12-14 RX ADMIN — LIDOCAINE HYDROCHLORIDE 60 MG: 20 INJECTION, SOLUTION INFILTRATION; PERINEURAL at 11:20

## 2018-12-14 RX ADMIN — PROPOFOL: 10 INJECTION, EMULSION INTRAVENOUS at 13:30

## 2018-12-14 ASSESSMENT — MIFFLIN-ST. JEOR: SCORE: 1641.57

## 2018-12-14 ASSESSMENT — COPD QUESTIONNAIRES: COPD: 1

## 2018-12-14 NOTE — ANESTHESIA POSTPROCEDURE EVALUATION
Patient: Ila Pierre    Procedure(s):  RIGHT MASTECTOMY WITH RIGHT SENTINEL NODE BIOPSY (  COLT )  AXILLARY NODE DISECTION ( COLT )  INSERT RIGHT  TISSUE EXPANDER BREAST ( RUEBECK )    Diagnosis:RIGHT BREAST CANCER   Diagnosis Additional Information: No value filed.    Anesthesia Type:  General, LMA    Note:  Anesthesia Post Evaluation    Patient location during evaluation: bedside  Patient participation: Able to fully participate in evaluation  Level of consciousness: awake  Pain management: adequate  Airway patency: patent  Cardiovascular status: acceptable  Respiratory status: acceptable  Hydration status: acceptable  PONV: none     Anesthetic complications: None    Comments: No anesthetic complications noted.         Last vitals:  Vitals:    12/14/18 1620 12/14/18 1630 12/14/18 1640   BP: 134/74 (!) 124/100 (P) 124/67   Pulse:      Resp: 10 15 (P) 16   Temp:   (P) 36.6  C (97.9  F)   SpO2: 95% 90% (P) 97%         Electronically Signed By: Michael Lopez DO,   December 14, 2018  4:54 PM

## 2018-12-14 NOTE — ANESTHESIA CARE TRANSFER NOTE
Patient: Ila Pierre    Procedure(s):  RIGHT MASTECTOMY WITH RIGHT SENTINEL NODE BIOPSY (  COLT )  AXILLARY NODE DISECTION ( COLT )  INSERT RIGHT  TISSUE EXPANDER BREAST ( RUEBECK )    Diagnosis: RIGHT BREAST CANCER   Diagnosis Additional Information: No value filed.    Anesthesia Type:   General, LMA     Note:  Airway :Face Mask  Patient transferred to:PACU  Comments: At end of procedure, spontaneous respirations, adequate tidal volumes, followed commands to voice, LMA removed atraumatically, oropharynx suctioned, airway patent after LMA removal. Oxygen via facemask at 10 liters per minute to PACU. Oxygen tubing connected to wall O2 in PACU, SpO2, NiBP, and EKG monitors and alarms on and functioning, Georgina Hugger warmer connected to patient gown, report on patient's clinical status given to PACU RN, RN questions answered.Handoff Report: Identifed the Patient, Identified the Reponsible Provider, Reviewed the pertinent medical history, Discussed the surgical course, Reviewed Intra-OP anesthesia mangement and issues during anesthesia, Set expectations for post-procedure period and Allowed opportunity for questions and acknowledgement of understanding      Vitals: (Last set prior to Anesthesia Care Transfer)    CRNA VITALS  12/14/2018 1436 - 12/14/2018 1513      12/14/2018             Pulse:  66    SpO2:  99 %    Resp Rate (observed):  14    Resp Rate (set):  10                Electronically Signed By: AYAN Damon CRNA  December 14, 2018  3:13 PM

## 2018-12-14 NOTE — OP NOTE
PREOPERATIVE DIAGNOSIS: right breast cancer.   POSTOPERATIVE DIAGNOSIS: right breast cancer.   PROCEDURE:   1.  Right mastectomy.   2.  Right axillary sentynel lymph node biopsy.   SURGEON: Conchita Gregory MD   FIRST ASSISTANT:Siva Vanegas PA-C,The physicians assistant was medically necessary for their expertise in hemostasis, suctioning, suturing, and retraction.    ANESTHESIA: General.   ESTIMATED BLOOD LOSS: Less 35 mL.   OPERATIVE PROCEDURE: After induction of laryngeal mask anesthesia, Ila Pierre right breast and axilla were prepped and draped in the usual sterile fashion.  Methylene blue solution was injected into the tissue underneath the nipple areolar complex.  Circumareolar incision was made sharply, and electrocautery dissection down to and through the subcutaneous fatty tissue. We then dissected the right axilla and encountered two radioactive nodes, one of them blue as well.  Frozen section exam showed no evidence of cancer in them.  We then raised flaps superiorly up to the clavicle, inferiorly to the abdominal musculature, lateral to the latissimus dorsi, and medial to the sternum. We then dissected the breast and subcutaneous tissue down to the chest wall and removed the breast, including the pectoralis major muscle fascia. The specimen was identified with a suture on the breast tail and sent to pathology. The wound was then inspected. Hemostasis was secured with electrocautery, and ligation as needed.  No immediate complications. Counts reported correct.   Dr. Rees scrubbed in to perform the reconstructive part of the operation.  CONCHITA GREGORY MD

## 2018-12-14 NOTE — ANESTHESIA PREPROCEDURE EVALUATION
Anesthesia Pre-Procedure Evaluation    Patient: Ila Pierre   MRN: [id number] : 1955          Preoperative Diagnosis: RIGHT BREAST CANCER     Procedure(s):  RIGHT MASTECTOMY WITH RIGHT SENTINEL NODE BIOPSY (  COLT )  POSSIBLE AXILLARY NODE DISECTION ( COLT )  INSERT RIGHT  TISSUE EXPANDER BREAST ( RUEBECK )    Past Medical History:   Diagnosis Date     Breast mass      Cancer (H)     right breast     COPD (chronic obstructive pulmonary disease) (H)      Herpes labialis      Hypertension goal BP (blood pressure) < 140/90      Menopausal symptoms      Palpitations      PONV (postoperative nausea and vomiting)      Past Surgical History:   Procedure Laterality Date     COLONOSCOPY  08     HC TOOTH EXTRACTION W/FORCEP      wisdom teeth     HYSTERECTOMY      for cyst and endometiosis     HYSTERECTOMY, PAP NO LONGER INDICATED       LEG SURGERY      lump removed     TONSILLECTOMY       TUBAL LIGATION         Anesthesia Evaluation     . Pt has had prior anesthetic.     History of anesthetic complications   - PONV        ROS/MED HX    ENT/Pulmonary:     (+)COPD, , . .    Neurologic:       Cardiovascular:     (+) hypertension----. : . . . :. Irregular Heartbeat/Palpitations, .       METS/Exercise Tolerance:     Hematologic:         Musculoskeletal:         GI/Hepatic:         Renal/Genitourinary:         Endo:     (+) Obesity, .      Psychiatric:         Infectious Disease:         Malignancy:   (+) Malignancy History of Breast          Other:                          Physical Exam  Normal systems: dental    Airway   Mallampati: II  TM distance: >3 FB  Neck ROM: full    Dental     Cardiovascular       Pulmonary             Lab Results   Component Value Date    WBC 7.5 2018    HGB 15.2 2018    HCT 46.8 2018     2018     2018    POTASSIUM 4.0 2018    CHLORIDE 108 2018    CO2 24 2018    BUN 13 2018    CR 0.60  "12/14/2018    GLC 87 07/26/2018    RAMONITA 8.5 07/26/2018    ALBUMIN 3.8 10/23/2012    PROTTOTAL 6.7 (L) 10/23/2012    ALT 18 10/23/2012    AST 18 10/23/2012    ALKPHOS 70 10/23/2012    BILITOTAL 0.4 10/23/2012       Preop Vitals  BP Readings from Last 3 Encounters:   12/14/18 147/85   11/13/18 142/90   11/12/18 134/78    Pulse Readings from Last 3 Encounters:   12/14/18 65   11/13/18 59   11/12/18 67      Resp Readings from Last 3 Encounters:   12/14/18 16   11/13/18 16   07/26/18 16    SpO2 Readings from Last 3 Encounters:   12/14/18 100%   11/13/18 97%   11/12/18 100%      Temp Readings from Last 1 Encounters:   12/14/18 37  C (98.6  F) (Temporal)    Ht Readings from Last 1 Encounters:   12/14/18 1.74 m (5' 8.5\")      Wt Readings from Last 1 Encounters:   12/14/18 102.5 kg (226 lb)    Estimated body mass index is 33.86 kg/m  as calculated from the following:    Height as of this encounter: 1.74 m (5' 8.5\").    Weight as of this encounter: 102.5 kg (226 lb).       Anesthesia Plan      History & Physical Review  History and physical reviewed and following examination; no interval change.    ASA Status:  2 .    NPO Status:  > 8 hours    Plan for General and LMA   PONV prophylaxis:  Ondansetron (or other 5HT-3), Dexamethasone or Solumedrol and Scopolamine patch  Propofol gtt   TIVA      Postoperative Care      Consents  Anesthetic plan, risks, benefits and alternatives discussed with:  Patient..                 Malika Hensley  "

## 2018-12-15 VITALS
SYSTOLIC BLOOD PRESSURE: 136 MMHG | TEMPERATURE: 97.8 F | OXYGEN SATURATION: 99 % | HEIGHT: 69 IN | WEIGHT: 226 LBS | HEART RATE: 53 BPM | RESPIRATION RATE: 16 BRPM | BODY MASS INDEX: 33.47 KG/M2 | DIASTOLIC BLOOD PRESSURE: 66 MMHG

## 2018-12-15 LAB — GLUCOSE BLDC GLUCOMTR-MCNC: 97 MG/DL (ref 70–99)

## 2018-12-15 PROCEDURE — 25000125 ZZHC RX 250: Performed by: PLASTIC SURGERY

## 2018-12-15 PROCEDURE — 25000128 H RX IP 250 OP 636: Performed by: PLASTIC SURGERY

## 2018-12-15 PROCEDURE — 25000132 ZZH RX MED GY IP 250 OP 250 PS 637: Performed by: PLASTIC SURGERY

## 2018-12-15 PROCEDURE — 82962 GLUCOSE BLOOD TEST: CPT

## 2018-12-15 RX ORDER — CLINDAMYCIN HCL 300 MG
300 CAPSULE ORAL 3 TIMES DAILY
Qty: 40 CAPSULE | Refills: 0 | Status: SHIPPED | OUTPATIENT
Start: 2018-12-15 | End: 2019-02-20

## 2018-12-15 RX ORDER — OXYCODONE HYDROCHLORIDE 5 MG/1
5-10 TABLET ORAL
Qty: 30 TABLET | Refills: 0 | Status: SHIPPED | OUTPATIENT
Start: 2018-12-15 | End: 2019-02-20

## 2018-12-15 RX ADMIN — OXYCODONE HYDROCHLORIDE 5 MG: 5 TABLET ORAL at 00:00

## 2018-12-15 RX ADMIN — OXYCODONE HYDROCHLORIDE 5 MG: 5 TABLET ORAL at 09:47

## 2018-12-15 RX ADMIN — METHOCARBAMOL TABLETS 750 MG: 750 TABLET, COATED ORAL at 00:00

## 2018-12-15 RX ADMIN — ACETAMINOPHEN 975 MG: 325 TABLET, FILM COATED ORAL at 00:00

## 2018-12-15 RX ADMIN — METHOCARBAMOL TABLETS 750 MG: 750 TABLET, COATED ORAL at 07:08

## 2018-12-15 RX ADMIN — ACETAMINOPHEN 975 MG: 325 TABLET, FILM COATED ORAL at 08:17

## 2018-12-15 RX ADMIN — SODIUM CHLORIDE, POTASSIUM CHLORIDE, SODIUM LACTATE AND CALCIUM CHLORIDE: 600; 310; 30; 20 INJECTION, SOLUTION INTRAVENOUS at 05:54

## 2018-12-15 RX ADMIN — CLINDAMYCIN IN 5 PERCENT DEXTROSE 900 MG: 18 INJECTION, SOLUTION INTRAVENOUS at 03:06

## 2018-12-15 RX ADMIN — OXYCODONE HYDROCHLORIDE 5 MG: 5 TABLET ORAL at 07:08

## 2018-12-15 RX ADMIN — OXYCODONE HYDROCHLORIDE 5 MG: 5 TABLET ORAL at 03:04

## 2018-12-15 NOTE — PLAN OF CARE
Pt. A & O x 4.  VSS.  Removed wraps & sports bra put on; incision CARMELITA, CDI.  KATE Drain (right) to bulb suction.  Pt. Tolerating a regular diet.  Pain managed w/ oxy & tylenol.  Stand by assist.  BS+, Flatus-.  Voiding adequately.  Scope patch behind left ear.  Pt. Educated on discharge orders.  Pt. Given antibiotics & oxycodone at discharge.  Pt. Was discharged to home with .

## 2018-12-15 NOTE — DISCHARGE INSTRUCTIONS
Next week Wednesday w/ Dr. Rodríguez.  Call [phone].  No heavy lifting or strenuous activity.  10 lb lifting limit.  Wear sports bra.  May shower and remove dressing tomorrow.

## 2018-12-15 NOTE — PROGRESS NOTES
"[facility]  General Surgery Progress Note    Admission Date: 2018  12/15/2018         Assessment and Plan:   Ila Pierre is a 63 year old female S/P Procedure(s):  RIGHT MASTECTOMY WITH RIGHT SENTINEL NODE BIOPSY (COLT)  INSERT RIGHT  TISSUE EXPANDER BREAST (RUEBECK)   1 Day Post-Op    - Pain controlled  - Ambulate 4x day and encourage IS  - Home today if vitals stable, F/U with Dr. Olea in 2 weeks  - KATE drain teaching             Interval History:   Sore at R axilla, but pain controlled, P in 40-50s, asymptomatic, P 53 now, tolerating diet, UO adequate, ambulating. Meds reviewed.                    Physical Exam:   Blood pressure 118/60, pulse 53, temperature 98  F (36.7  C), temperature source Oral, resp. rate 16, height 1.74 m (5' 8.5\"), weight 102.5 kg (226 lb), SpO2 98 %, not currently breastfeeding.  Temperature Temp  Av.6  F (36.4  C)  Min: 95.7  F (35.4  C)  Max: 98.6  F (37  C)   I/O last 3 completed shifts:  In: 1920 [P.O.:120; I.V.:1800]  Out: 4305 [Urine:4100; Drains:150; Blood:55]  Constitutional:  Awake, alert, oriented, and in no apparent distress.   Lungs: No increased work of breathing, good air exchange, clear to auscultation bilaterally, and no crackles or wheezing.   Cardiovascular: Regular rate and rhythm, normal S1 and S2, and no murmur noted.   Chest Wound(s): Appropriately tender. Clean, dry, and intact. No erythema or drainage. No palpable seroma/hematoma. KATE drain intact without leakage, serosanguinous.     Extremities: Non-tender and without significant swelling to upper extremities. No edema or calf tenderness.          Data:   No new labs/imaging.     Kimberly Pollock PA-C  [facility]  [phone]  "

## 2018-12-15 NOTE — PLAN OF CARE
Pt arrived on floor at 1640. A&Ox4. AVSS, ex bradycardic. LS clear. BS hypoactive, no flatus. Ace wrap around chest CDI. KATE patent. Tolerating regular diet. Ambulated ALLEN nieto. Voiding adequately. Refused capno. Pain controlled with scheduled tylenol.

## 2018-12-15 NOTE — PROGRESS NOTES
Plastic Surgery POD# 1    Patient feeling well, pain not severe    Skin flaps healthy, no hematoma    Plan: home today, f/u next week.  Fully instructed.

## 2018-12-15 NOTE — PLAN OF CARE
A&Ox4. VSS ex hailey. SBA. Regular diet. ACE wrap around chest, CDI. KATE Drain patent, good output. Voiding adequately. BS active, -flatus, -BM. C/o minimal pain, decreased with scheduled Tylenol and PRN oxycodone.  IVF @ 100ml/hr. Continue to monitor.

## 2018-12-16 NOTE — OP NOTE
Procedure Date: 12/14/2018      PREOPERATIVE DIAGNOSES:   1.  Right breast cancer.   2.  Acquired absence, right breast.      POSTOPERATIVE DIAGNOSES:   1.  Right breast cancer.   2.  Acquired absence, right breast.      PROCEDURE:   1.  Right first-stage breast reconstruction with placement of tissue expander.   2.  Implantation of acellular dermal matrix.   3.  Laser angiography.      TECHNIQUE:  The patient was marked preoperatively.  She was placed supine on the procedure table under general LMA anesthesia and underwent a right mastectomy and sentinel lymph node biopsy by Dr. Perales.  The margins of the sentinel node were clear.  The timeout was done.  The  patient was given 10 mg of indocyanine green and the skin was imaged with an infrared camera.  This showed satisfactory perfusion throughout.  Because this was a unilateral reconstruction I felt that achieving as much ptosis and natural shape as possible would be advantageous and therefore a prepectoral position was chosen.  I closed off the lateral gutter with internal sutures of 2-0 Vicryl.  I then rinsed 16 x 20 cm sheet DermACELL and this was used to create a deep soft tissue pocket for soft tissue support around the expander.  I placed an Allergan style 133MX 700 mL tissue expander.  This was prepared by removing the air and rinsing it in Betadine solution.  It was filled with 400 mL of saline.  It was placed under the DermACELL and the DermaACELL was tacked around this in multiple locations to achieve a nice stable pocket.  I then advanced the medial skin laterally and de-epithelialized a portion of this, so I had a good closure over healthy vascularized skin.  I advanced the lateral flap medially and closed this with 3-0 and 4-0 Vicryl after placing a drain.  The drain was sewn in with 2-0 silk.  Sterile dressings were applied.  Anesthesia was reversed and the patient was taken to the recovery room in satisfactory condition.         FABIAN METZ,  MD             D: 2018   T: 12/15/2018   MT: SONIDO      Name:     FELIPA GARCIA   MRN:      [id number]        Account:        [id number]   :      1955           Procedure Date: 2018      Document: [id number]

## 2018-12-17 ENCOUNTER — TELEPHONE (OUTPATIENT)
Dept: SURGERY | Facility: PHYSICIAN GROUP | Age: 63
End: 2018-12-17

## 2018-12-17 LAB — COPATH REPORT: NORMAL

## 2018-12-17 NOTE — TELEPHONE ENCOUNTER
Winnie is s/p right mastectomy, right SLNB, with reconstruction on 12/14/2018 with fItikhar ePrales and Anne. Pathology pending. Patient reports incision is clean, dry, and intact. KATE intact with minimal drainage. She reports minimal pain. She is wearing her post mastectomy garment. She is having normal bowel movements and denies urinary difficulties. She is gradually increasing her activities.      Await call from Dr. Perales to discuss pathology results. Will have our clinic reach out to patient to schedule post-op appointment. Both parties in agreement of plan.    Trina GAON, RN, OCN  Oncology Care Coordinator  [facility]  [phone]

## 2018-12-18 NOTE — DISCHARGE SUMMARY
[facility] Discharge Summary    Ila Pierre MRN# [id number]   Age: 63 year old YOB: 1955     Date of Admission:  12/14/2018  Date of Discharge:  12/15/2018 10:40 AM  Admitting Provider:  Adolfo Perales MD  Discharge Provider:  Siva Vanegas PA-C  Discharging Service: General Surgery     Primary Provider: Aleshia Meléndez  Primary Care Physician Phone Number: [phone]          Admission Diagnoses:   Principle Diagnosis: RIGHT BREAST CANCER   Breast cancer (H)  Secondary Diagnoses:          Discharge Diagnosis:   RIGHT BREAST CANCER           Procedures:   Procedure(s): By Dr Perales:  1.  Right mastectomy.   2.  Right axillary sentynel lymph node biopsy.     By Dr. Rodríguez:  1.  Right first-stage breast reconstruction with placement of tissue expander.   2.  Implantation of acellular dermal matrix.   3.  Laser angiography.                 Discharge Medications:     Discharge Medication List as of 12/15/2018  9:41 AM      START taking these medications    Details   clindamycin (CLEOCIN) 300 MG capsule Take 1 capsule (300 mg) by mouth 3 times daily for 7 days, Disp-40 capsule, R-0, E-Prescribe      oxyCODONE (ROXICODONE) 5 MG tablet Take 1-2 tablets (5-10 mg) by mouth every 3 hours as needed, Disp-30 tablet, R-0, Local Print         CONTINUE these medications which have NOT CHANGED    Details   acyclovir (ZOVIRAX) 400 MG tablet TAKE 1 TABLET(400 MG) BY MOUTH THREE TIMES DAILY, Disp-15 tablet, R-3, E-Prescribe      aspirin 81 MG tablet Take 81 mg by mouth daily , Historical      atenolol (TENORMIN) 50 MG tablet TAKE 1 TABLET(50 MG) BY MOUTH DAILY, Disp-90 tablet, R-3, E-PrescribeProfile Rx: patient will contact pharmacy when needed      Cholecalciferol (VITAMIN D3 PO) Take 5,000 Units by mouth every evening Takes at 18:00, Historical      l-tyrosine 500 MG TABS Take 2 tablets by mouth daily., 2 tablet, Oral, DAILY, Until Discontinued, Historical      Misc Natural Products  (GLUCOSAMINE CHOND MSM FORMULA PO) Take 1 tablet by mouth every evening (Glucosamine 500 mg Chondroitin 400 mg  mg) takes at 18:00, Historical      Multiple Vitamins-Minerals (CENTRUM SILVER) per tablet Take 1 tablet by mouth every evening , Historical      Omega-3 Fatty Acids (FISH OIL PO) Take 600 mg by mouth every evening Takes at 18:00, Historical      order for DME Equipment being ordered: post mastectomy garment.     Scheduled for right mastectomy, right SLNB, possible axillary node dissection, tissue expander on 12/14/18.Disp-2 Units, R-0, Local Print      Probiotic Product (PROBIOTIC DAILY PO) Take 1 capsule by mouth every evening Acidophilus with goats milk 343 mg- 10 mg.  Takes at 18:00, Historical                 Allergies:         Allergies   Allergen Reactions     Codeine Sulfate Diarrhea     Penicillins      As a child             Brief History of Illness:    Reason for your hospital stay      Right breast mastectomy             After discussing the risks, benefits, and possible complications, informed consent was obtained and the patient underwent the above procedure.  There were no complications.  Please see the Operative Report for full details.           Hospital Course:   Ila Pierre's hospital course was unremarkable.  She recovered as anticipated and experienced no post-operative complications. She had some Rt axilla pain post op but controlled with PO narcotics.  Her pulse was in the 40-50's but asymptomatic.  She was ambulating and voiding well, and tolerating diet on POD1, so was discharged to home.    On the date of discharge, the patient was discharged to home in stable condition (though mild bradycardia) and afebrile.  She verbalized understanding of all discharge instructions and felt comfortable with the discharge plan.  She was asked to call with any further questions or concerns.         Condition on Discharge:      Discharge condition: Stable   Discharge vitals: Blood  "pressure 136/66, pulse 53, temperature 97.8  F (36.6  C), temperature source Oral, resp. rate 16, height 1.74 m (5' 8.5\"), weight 102.5 kg (226 lb), SpO2 99 %, not currently breastfeeding.           Discharge Disposition:   Discharged to home          Discharge Instructions and Follow-Up:      Ila Pierre was asked to follow up with surgical team in 1-2 weeks.    Please note, I did not see this patient on the day of discharge, and have used other provider's progress notes in constructing this discharge summary.    Siva Vanegas PA-C  Dictating on behalf of Dr. Perales  General Surgery  [facility]  [phone]     "

## 2018-12-19 ENCOUNTER — TELEPHONE (OUTPATIENT)
Dept: ONCOLOGY | Facility: CLINIC | Age: 63
End: 2018-12-19

## 2018-12-19 ENCOUNTER — TELEPHONE (OUTPATIENT)
Dept: SURGERY | Facility: CLINIC | Age: 63
End: 2018-12-19

## 2018-12-19 NOTE — TELEPHONE ENCOUNTER
ONCOLOGY INTAKE: Records Information      APPT INFORMATION:  Referring provider:  Adolfo Perales  Referring provider s clinic:  [facility]  Reason for visit/diagnosis:  RT Breast Cancer    Were the records received with the referral (via [employer])? NO    Has patient been seen for any external appt for this diagnosis (enter clinic/location)? Per Trina, All records in epic

## 2018-12-19 NOTE — TELEPHONE ENCOUNTER
Winnie is scheduled to meet with Dr. Belkis Molina, medical oncology, on 1/11/2019 at 2:30 pm. Patient verbalized understanding.    Trina GAON, RN, OCN  Oncology Care Coordinator  [facility]  [phone]

## 2018-12-21 ENCOUNTER — TELEPHONE (OUTPATIENT)
Dept: FAMILY MEDICINE | Facility: CLINIC | Age: 63
End: 2018-12-21

## 2018-12-21 NOTE — TELEPHONE ENCOUNTER
Reason for Call:  Other     Detailed comments: patient would like results from her mirza on 10/31/18.  Did have breast cancer and doing great.  Just has questions    Phone Number Patient can be reached at: Home number on file [phone] (home)    Best Time:today    Can we leave a detailed message on this number? YES    Call taken on 12/21/2018 at 11:24 AM by LEVAR POWERS

## 2018-12-21 NOTE — TELEPHONE ENCOUNTER
Called the patient back.     She had some questions regarding billing and insurance coverage of her recent mammogram.

## 2018-12-31 ENCOUNTER — CARE COORDINATION (OUTPATIENT)
Dept: SURGERY | Facility: CLINIC | Age: 63
End: 2018-12-31

## 2018-12-31 ENCOUNTER — OFFICE VISIT (OUTPATIENT)
Dept: SURGERY | Facility: CLINIC | Age: 63
End: 2018-12-31
Payer: COMMERCIAL

## 2018-12-31 VITALS
HEIGHT: 69 IN | DIASTOLIC BLOOD PRESSURE: 88 MMHG | WEIGHT: 226 LBS | HEART RATE: 60 BPM | SYSTOLIC BLOOD PRESSURE: 134 MMHG | OXYGEN SATURATION: 99 % | BODY MASS INDEX: 33.47 KG/M2

## 2018-12-31 DIAGNOSIS — Z09 FOLLOW-UP EXAM: Primary | ICD-10-CM

## 2018-12-31 ASSESSMENT — MIFFLIN-ST. JEOR: SCORE: 1641.57

## 2018-12-31 NOTE — PROGRESS NOTES
Oncotype submitted on  Case # [id number] at request of Dr. Molina.    Trina Wilcox BSN, RN, OCN  Oncology Care Coordinator  [facility] [place]  [phone]

## 2019-01-01 NOTE — PROGRESS NOTES
Recovering well after single mastectomy, reconstruction and SLN biopsy on the right side.  Found 3 mm infiltrating ductal carcinoma in a multifocal DCIS background.  T1a N0 - stage 1A  Healing well, following with plastics later this week.  No evidence of skin problems, no fluid build up.    Oncology consult coming up.  Follow up with us as needed.    Please route or send letter to:  Primary Care Provider (PCP), Referring Provider, Include Op Note, Include Path and Include Progress Note

## 2019-01-10 ENCOUNTER — TELEPHONE (OUTPATIENT)
Dept: ONCOLOGY | Facility: CLINIC | Age: 64
End: 2019-01-10

## 2019-01-10 NOTE — TELEPHONE ENCOUNTER
Called patient regarding her clinic appointment with Dr. Molina tomorrow at 0230 PM. Patient is aware of location and parking policy of clinic. Krytsyna Maloney RN,BSN,OCN

## 2019-01-11 ENCOUNTER — ONCOLOGY VISIT (OUTPATIENT)
Dept: ONCOLOGY | Facility: CLINIC | Age: 64
End: 2019-01-11
Attending: SURGERY
Payer: COMMERCIAL

## 2019-01-11 ENCOUNTER — HOSPITAL ENCOUNTER (OUTPATIENT)
Facility: CLINIC | Age: 64
Setting detail: SPECIMEN
Discharge: HOME | End: 2019-01-11
Attending: SURGERY | Admitting: INTERNAL MEDICINE
Payer: COMMERCIAL

## 2019-01-11 VITALS
WEIGHT: 232.4 LBS | HEIGHT: 69 IN | RESPIRATION RATE: 18 BRPM | SYSTOLIC BLOOD PRESSURE: 121 MMHG | TEMPERATURE: 97.9 F | OXYGEN SATURATION: 99 % | BODY MASS INDEX: 34.42 KG/M2 | HEART RATE: 63 BPM | DIASTOLIC BLOOD PRESSURE: 77 MMHG

## 2019-01-11 DIAGNOSIS — C50.811 MALIGNANT NEOPLASM OF OVERLAPPING SITES OF RIGHT BREAST IN FEMALE, ESTROGEN RECEPTOR POSITIVE (H): Primary | ICD-10-CM

## 2019-01-11 DIAGNOSIS — Z17.0 MALIGNANT NEOPLASM OF OVERLAPPING SITES OF RIGHT BREAST IN FEMALE, ESTROGEN RECEPTOR POSITIVE (H): Primary | ICD-10-CM

## 2019-01-11 LAB
ALBUMIN SERPL-MCNC: 3.4 G/DL (ref 3.4–5)
ALP SERPL-CCNC: 57 U/L (ref 40–150)
ALT SERPL W P-5'-P-CCNC: 19 U/L (ref 0–50)
ANION GAP SERPL CALCULATED.3IONS-SCNC: 2 MMOL/L (ref 3–14)
AST SERPL W P-5'-P-CCNC: 22 U/L (ref 0–45)
BASOPHILS # BLD AUTO: 0 10E9/L (ref 0–0.2)
BASOPHILS NFR BLD AUTO: 0.2 %
BILIRUB SERPL-MCNC: 0.3 MG/DL (ref 0.2–1.3)
BUN SERPL-MCNC: 9 MG/DL (ref 7–30)
CALCIUM SERPL-MCNC: 8.8 MG/DL (ref 8.5–10.1)
CHLORIDE SERPL-SCNC: 110 MMOL/L (ref 94–109)
CO2 SERPL-SCNC: 30 MMOL/L (ref 20–32)
CREAT SERPL-MCNC: 0.66 MG/DL (ref 0.52–1.04)
DIFFERENTIAL METHOD BLD: NORMAL
EOSINOPHIL # BLD AUTO: 0.1 10E9/L (ref 0–0.7)
EOSINOPHIL NFR BLD AUTO: 1 %
ERYTHROCYTE [DISTWIDTH] IN BLOOD BY AUTOMATED COUNT: 13.4 % (ref 10–15)
GFR SERPL CREATININE-BSD FRML MDRD: >90 ML/MIN/{1.73_M2}
GLUCOSE SERPL-MCNC: 99 MG/DL (ref 70–99)
HCT VFR BLD AUTO: 41.1 % (ref 35–47)
HGB BLD-MCNC: 14 G/DL (ref 11.7–15.7)
IMM GRANULOCYTES # BLD: 0 10E9/L (ref 0–0.4)
IMM GRANULOCYTES NFR BLD: 0.2 %
LYMPHOCYTES # BLD AUTO: 2.5 10E9/L (ref 0.8–5.3)
LYMPHOCYTES NFR BLD AUTO: 31.3 %
MCH RBC QN AUTO: 31.4 PG (ref 26.5–33)
MCHC RBC AUTO-ENTMCNC: 34.1 G/DL (ref 31.5–36.5)
MCV RBC AUTO: 92 FL (ref 78–100)
MONOCYTES # BLD AUTO: 0.7 10E9/L (ref 0–1.3)
MONOCYTES NFR BLD AUTO: 8.9 %
NEUTROPHILS # BLD AUTO: 4.7 10E9/L (ref 1.6–8.3)
NEUTROPHILS NFR BLD AUTO: 58.4 %
NRBC # BLD AUTO: 0 10*3/UL
NRBC BLD AUTO-RTO: 0 /100
PLATELET # BLD AUTO: 298 10E9/L (ref 150–450)
POTASSIUM SERPL-SCNC: 4.3 MMOL/L (ref 3.4–5.3)
PROT SERPL-MCNC: 6.8 G/DL (ref 6.8–8.8)
RBC # BLD AUTO: 4.46 10E12/L (ref 3.8–5.2)
SODIUM SERPL-SCNC: 142 MMOL/L (ref 133–144)
WBC # BLD AUTO: 8 10E9/L (ref 4–11)

## 2019-01-11 PROCEDURE — 80053 COMPREHEN METABOLIC PANEL: CPT | Performed by: INTERNAL MEDICINE

## 2019-01-11 PROCEDURE — 85025 COMPLETE CBC W/AUTO DIFF WBC: CPT | Performed by: INTERNAL MEDICINE

## 2019-01-11 PROCEDURE — G0463 HOSPITAL OUTPT CLINIC VISIT: HCPCS

## 2019-01-11 PROCEDURE — 36415 COLL VENOUS BLD VENIPUNCTURE: CPT

## 2019-01-11 PROCEDURE — 99205 OFFICE O/P NEW HI 60 MIN: CPT | Performed by: INTERNAL MEDICINE

## 2019-01-11 ASSESSMENT — PAIN SCALES - GENERAL: PAINLEVEL: NO PAIN (0)

## 2019-01-11 ASSESSMENT — MIFFLIN-ST. JEOR: SCORE: 1670.6

## 2019-01-11 NOTE — PROGRESS NOTES
"Oncology Rooming Note    January 11, 2019 2:22 PM   Ila Pierre is a 63 year old female who presents for:    Chief Complaint   Patient presents with     Oncology Clinic Visit     Initial Vitals: /77 (BP Location: Right arm, Patient Position: Sitting, Cuff Size: Adult Regular)   Pulse 63   Temp 97.9  F (36.6  C) (Oral)   Resp 18   Ht 1.74 m (5' 8.5\")   Wt 105.4 kg (232 lb 6.4 oz)   SpO2 99%   BMI 34.82 kg/m   Estimated body mass index is 34.82 kg/m  as calculated from the following:    Height as of this encounter: 1.74 m (5' 8.5\").    Weight as of this encounter: 105.4 kg (232 lb 6.4 oz). Body surface area is 2.26 meters squared.  No Pain (0) Comment: Data Unavailable   No LMP recorded. Patient has had a hysterectomy.  Allergies reviewed: Yes  Medications reviewed: Yes    Medications: Medication refills not needed today.  Pharmacy name entered into [facility] [facility]:    [id number] - [facility] [address] AT [facility] & [address] [place] [id number] [place] [facility] [place], [place] - [address]    Clinical concerns: NO   5 minutes for nursing intake (face to face time)          Christelle Grimes MA          Medical Assistant Note:  Ila Pierre presents today for yes.    Patient seen by provider today: Yes: Dr Molina.   present during visit today: Not Applicable.    Concerns: No Concerns.    Procedure:  Lab draw site: LAC, Needle type: BF, Gauge: 21. guaze and coban applied    Post Assessment:  Labs drawn without difficulty: Yes.    Discharge Plan:  Departure Mode: Ambulatory.    Face to Face Time: 5.    Christelle Grimes MA              "

## 2019-01-11 NOTE — PROGRESS NOTES
[facility]    Hematology/Oncology New Patient Note      Today's Date: 01/11/19    Reason for Consult: right breast cancer      HISTORY OF PRESENT ILLNESS: Ila Pierre is a 63 year old female with PMHx of HTN, COPD who presents with right-sided breast cancer.   She undergone a screening mammogram, which found a new right breast mass.  On 12/14/18, she underwent right mastectomy and right axillary sentinel lymph node biopsy by Dr. Perales, with reconstruction by Dr. Rees.  Pathology showed infiltrating ductal carcinoma, grade 1, forming multiple foci up to 3 mm, DCIS intermediate nuclear grade, forming multiple foci.  There was intraductal papilloma with atypia identified.  ER positive (98%), MA positive (98%), HER-2 negative, margins negative, 0 of 2 lymph nodes involved, stage [id number] (stage IA).    Her mother had breast cancer and has lumpectomy at age 90.  Her sister has bilateral mastectomy at age 60 for breast cancer as well.    Winnie does not have any children.  She has had a total hysterectomy and bilateral oophorectomy and took estrogen patch for about 10 years up until her breast cancer diagnosis.  She quit smoking many years ago.  She drinks alcohol 1-2 drinks a week on weekends.  She lives with her significant other in [place].      Currently, Winnie feels well.  She has undergone 2 fills for her tissue expander.  She feels that her wounds from surgery are healing up well.        REVIEW OF SYSTEMS:   14 point ROS was reviewed and is negative other than as noted above in HPI.       HOME MEDICATIONS:  Current Outpatient Medications   Medication Sig Dispense Refill     aspirin 81 MG tablet Take 81 mg by mouth daily        atenolol (TENORMIN) 50 MG tablet TAKE 1 TABLET(50 MG) BY MOUTH DAILY (Patient taking differently: Take 50 mg by mouth every evening Takes at 18:00) 90 tablet 3     Cholecalciferol (VITAMIN D3 PO) Take 5,000 Units by mouth every evening Takes at 18:00        l-tyrosine 500 MG TABS Take 2 tablets by mouth daily.       Misc Natural Products (GLUCOSAMINE CHOND MSM FORMULA PO) Take 1 tablet by mouth every evening (Glucosamine 500 mg Chondroitin 400 mg  mg) takes at 18:00       Multiple Vitamins-Minerals (CENTRUM SILVER) per tablet Take 1 tablet by mouth every evening        Omega-3 Fatty Acids (FISH OIL PO) Take 600 mg by mouth every evening Takes at 18:00       order for DME Equipment being ordered: post mastectomy garment.     Scheduled for right mastectomy, right SLNB, possible axillary node dissection, tissue expander on 12/14/18. 2 Units 0     Probiotic Product (PROBIOTIC DAILY PO) Take 1 capsule by mouth every evening Acidophilus with goats milk 343 mg- 10 mg.  Takes at 18:00       acyclovir (ZOVIRAX) 400 MG tablet TAKE 1 TABLET(400 MG) BY MOUTH THREE TIMES DAILY (Patient not taking: Reported on 1/11/2019) 15 tablet 3         ALLERGIES:  Allergies   Allergen Reactions     Codeine Sulfate Diarrhea     Penicillins      As a child         PAST MEDICAL HISTORY:  Past Medical History:   Diagnosis Date     Breast mass      Cancer (H)     right breast     COPD (chronic obstructive pulmonary disease) (H)      Herpes labialis      Hypertension goal BP (blood pressure) < 140/90      Menopausal symptoms      Palpitations      PONV (postoperative nausea and vomiting)          PAST SURGICAL HISTORY:  Past Surgical History:   Procedure Laterality Date     COLONOSCOPY  8-21-08     DISSECT LYMPH NODE AXILLA Right 12/14/2018    Procedure: AXILLARY NODE DISECTION ( COLT );  Surgeon: Adolfo Perales MD;  Location:  OR      TOOTH EXTRACTION W/FORCEP  1973    wisdom teeth     HYSTERECTOMY  2005    for cyst and endometiosis     HYSTERECTOMY, PAP NO LONGER INDICATED       INSERT TISSUE EXPANDER BREAST Bilateral 12/14/2018    Procedure: INSERT RIGHT  TISSUE EXPANDER BREAST ( LASHAY );  Surgeon: Mio Rodríguez MD;  Location:  OR     LEG SURGERY  1975    lump  "removed     MASTECTOMY SIMPLE, SENTINEL NODE, COMBINED Right 2018    Procedure: RIGHT MASTECTOMY WITH RIGHT SENTINEL NODE BIOPSY (  COLT );  Surgeon: Adolfo Perales MD;  Location: [facility] OR     TONSILLECTOMY  1964     TUBAL LIGATION           SOCIAL HISTORY:  Social History     Socioeconomic History     Marital status: Single     Spouse name: Not on file     Number of children: Not on file     Years of education: Not on file     Highest education level: Not on file   Social Needs     Financial resource strain: Not on file     Food insecurity - worry: Not on file     Food insecurity - inability: Not on file     Transportation needs - medical: Not on file     Transportation needs - non-medical: Not on file   Occupational History     Not on file   Tobacco Use     Smoking status: Former Smoker     Last attempt to quit: 10/19/1997     Years since quittin.2     Smokeless tobacco: Never Used   Substance and Sexual Activity     Alcohol use: Yes     Comment: 3 beer - 3 times a week     Drug use: No     Sexual activity: Yes     Partners: Male     Birth control/protection: Surgical     Comment: tubal ligation -  no children   Other Topics Concern     Parent/sibling w/ CABG, MI or angioplasty before 65F 55M? No   Social History Narrative     Not on file         FAMILY HISTORY:  Family History   Problem Relation Age of Onset     Osteoporosis Mother      Breast Cancer Mother      Prostate Cancer Father      Lipids Brother      Breast Cancer Sister          PHYSICAL EXAM:  Vital signs:  /77 (BP Location: Right arm, Patient Position: Sitting, Cuff Size: Adult Regular)   Pulse 63   Temp 97.9  F (36.6  C) (Oral)   Resp 18   Ht 1.74 m (5' 8.5\")   Wt 105.4 kg (232 lb 6.4 oz)   SpO2 99%   BMI 34.82 kg/m     ECO  GENERAL/CONSTITUTIONAL: No acute distress. Accompanied by significant other.  EYES: No scleral icterus.  LYMPH: No anterior cervical, posterior cervical, supraclavicular, axillary or inguinal " "adenopathy.   RESPIRATORY: Clear to auscultation bilaterally. No crackles or wheezing.   CARDIOVASCULAR: Regular rate and rhythm without murmurs, gallops, or rubs.  GASTROINTESTINAL: No tenderness. The patient has normal bowel sounds. No guarding.  No distention.  BREAST: Right-s/p mastectomy with tissue expander in place.  Left-no palpable mass, discharge, rash, or axillary lymphadenopathy.   MUSCULOSKELETAL: Warm and well-perfused, no cyanosis, clubbing, or edema.  NEUROLOGIC: Alert, oriented, answers questions appropriately.  INTEGUMENTARY: No jaundice.  GAIT: Steady, does not use assistive device      LABS:  CBC RESULTS:   Recent Labs   Lab Test 11/13/18  1631   WBC 7.5   RBC 4.78   HGB 15.2   HCT 46.8   MCV 98   MCH 31.8   MCHC 32.5   RDW 14.1          Recent Labs   Lab Test 12/14/18  0910 07/26/18  0809 05/12/17  0857  10/23/12  0917   NA  --  139  --   --  138   POTASSIUM 4.0 4.1  --   --  5.0   CHLORIDE  --  108  --   --  102   CO2  --  24  --   --  28   ANIONGAP  --  7  --   --  8.1   GLC  --  87 87   < > 89   BUN  --  13  --   --  13   CR 0.60 0.72  --   --  0.90   RAMONITA  --  8.5  --   --  8.5    < > = values in this interval not displayed.         PATHOLOGY:  12/14/18:  FINAL DIAGNOSIS:   A: Lymph node, right axillary sentinel, biopsy: Negative for malignancy.     B: Lymph node, right axillary sentinel #2, biopsy: Negative for   malignancy.     C: Breast, right, mastectomy:   1. Infiltrating ductal carcinoma (\"invasive carcinoma of no special type\"   per WHO classification), Cande   grade 1 (of 3), forming multiple foci up to 3 mm in greatest dimension.   2. Ductal carcinoma in situ (DCIS), intermediate nuclear grade, forming   multiple foci.   3. Intraductal papilloma with atypia identified.   4. Multiple biopsy sites identified.   5. Estrogen receptor (ER) immunohistochemistry studies on the invasive   lesion were previously reported as   positive, 98%, and progesterone receptor (WV) " immunohistochemistry studies    were previously reported as   positive, 98% (see case [id number]).   6. Margins negative for malignancy.     See synoptic summary.     Part(s) Involved:   C: Mastectomy, right breast     Synoptic Report:     SPECIMEN     Procedure:         - Total mastectomy     Specimen Laterality:         - Right     TUMOR     Histologic Type:         - Invasive carcinoma of no special type (ductal, not otherwise   specified)     Histologic Grade (Cande Histologic Score)       Glandular (Acinar) / Tubular Differentiation:           - Score 1       Nuclear Pleomorphism:           - Score 2       Mitotic Rate:           - Score 1 (<=3 mitoses per mm2)       Overall Grade:           - Grade 1 (scores of 3, 4 or 5)     Tumor Size: 3 Millimeters (mm)     Ductal Carcinoma In Situ (DCIS):         - DCIS is present in specimen     Accessory Findings       Treatment Effect:           - No known presurgical therapy     MARGINS     Invasive Carcinoma Margins:         - Uninvolved by invasive carcinoma       Distance from Closest Margin in Millimeters (mm):           - Distance is > 10 Millimeters (mm)       Closest Margin:           Upper anterior     DCIS Margins:         - Uninvolved by DCIS       Distance of DCIS from Closest Margin (mm):           - Distance is < 1 Millimeter (mm)       Closest Margin:           - Posterior     LYMPH NODES     Number of Lymph Nodes with Macrometastases (> 2 mm): 0     Number of Lymph Nodes with Micrometastases (> 0.2 mm to 2 mm and / or >   200     cells): 0     Number of Lymph Nodes with Isolated Tumor Cells (<= 0.2 mm and <= 200   cells): 0     Number of Lymph Nodes Examined: 2     Number of [place] Nodes Examined: 2     PATHOLOGIC STAGE CLASSIFICATION (PTNM, AJCC 8TH EDITION)     TNM Descriptors:         - m (multiple foci of invasive carcinoma)     Primary Tumor (Invasive Carcinoma) (pT):         - pT1a     Regional Lymph Nodes (pN)       Modifier:           -  (sn): Only sentinel node(s) evaluated.       Category (pN):           - pN0     COMMENTS    Estrogen receptor (ER) immunohistochemistry studies on the invasive         lesion were previously reported as positive, 98%, and progesterone         receptor (VT) immunohistochemistry studies were previously reported   as         positive, 98% (see case [id number]).       IMAGING:  Bilateral mammogram 10/31/18:  BREAST DENSITY: Heterogeneously dense.     COMMENTS: Possible developing architectural distortion in the RIGHT  breast at approximately 8:00-10:00 position, 6 to 7 cm from the  nipple. Also, there is an enlarged RIGHT axillary lymph node. No  concerning findings in the left breast.                                                                       IMPRESSION: BI-RADS CATEGORY: 0 - Need Additional Imaging Evaluation  and/or Prior Mammograms for Comparison.    Right breast ultrasound 11/1/18:  FINDINGS:  Targeted ultrasound was performed. At the 10:00 position of  the right breast, 7 cm from the nipple, there is an ill-defined area  of hypoechoic tissue which measures approximately 2.8 cm in maximum  dimension.     Survey of the right axilla shows a enlarged, partially  cystic-appearing mass measuring 1.9 x 1.0 x 1.1 cm.                                                                       IMPRESSION: BI-RADS CATEGORY: 4 - Suspicious Abnormality-Biopsy Should  Be Considered.  Ill-defined hypoechoic tissue at the 10:00 position of the right  breast and a partially cystic right axillary mass. Ultrasound-guided  core biopsy x2 is recommended. Results and recommendation were  discussed with the patient during her appointment.    MRI breast 11/18/18:  1. Segmental distribution of clumped non-mass enhancement encompassing  nearly the entire upper outer quadrant of the RIGHT breast measures up  to 14.4 cm in maximal dimension and comes to within 1.5 cm of the  nipple. Given the biopsy results, this is concerning for DCIS.  If it  would be of clinical use, additional biopsy could be performed more  anteriorly in the breast to more definitively establish the extent of  disease prior to surgery. If this is desired, recommend additional  evaluation with ultrasound and possible biopsy, but if no target is  found, MRI guided biopsy could be performed.  2. No concerning areas of enhancement in the LEFT breast.  3. No lymphadenopathy.      ASSESSMENT/PLAN:  Ila Pierre is a 63 year old female with:    1) Right-sided breast cancer, upper outer quadrant: On 12/14/18, she underwent right mastectomy and right axillary sentinel lymph node biopsy, with reconstruction.  Pathology showed infiltrating ductal carcinoma, grade 1, forming multiple foci up to 3 mm, DCIS intermediate nuclear grade, forming multiple foci.  There was intraductal papilloma with atypia identified.  ER positive (98%), DC positive (98%), HER-2 negative, margins negative, 0 of 2 lymph nodes involved, stage [id number] (stage IA).    We reviewed the pathology results.  I did have the tumor sent for Oncotype DX testing, which is still pending.  With the small strongly ER and DC positive, low grade tumor, hopefully, she will have a low risk score and not require adjuvant chemotherapy.  However, we will await those results.  She had a mastectomy and negative lymph nodes, so she will not need adjuvant radiation.  We did discuss hormonal therapy with aromatase inhibitor.    -awaiting Oncotype DX results.  Hopefully, this will result by next week, and I can call her with results  -if low risk score, she can start on anastrozole  -she underwent teaching on anastrozole today.   We discussed potential side effects, including myalgias/arthralgias, bone density loss/osteoporosis, mood changes, hot flashes, weight gain, headache, fatigue, small cardiovascular risk.  -I discussed the schedule, regimen, dose, possible side effects, the benefits and risks, and patient agrees to treatment  plan.  -labs today    2) Bone health: She last had a DEXA scan in May 2012 that was normal.  With her plans to start an aromatase inhibitor, we will obtain another DEXA scan.  -DEXA scan  -she will take calcium and vitamin D      I spent a total of 60 minutes with the patient, with over >50% of the time in counseling and/or coordination of care.       Belkis Molina MD  Hematology/Oncology  [facility]

## 2019-01-11 NOTE — LETTER
"    1/11/2019         RE: Ila Pierre  [address] [address]        Dear Colleague,    Thank you for referring your patient, Ila Pierre, to the [facility]. Please see a copy of my visit note below.        [facility] Rooming Note    January 11, 2019 2:22 PM   Ila Pierre is a 63 year old female who presents for:    Chief Complaint   Patient presents with     Oncology Clinic Visit     Initial Vitals: /77 (BP Location: Right arm, Patient Position: Sitting, Cuff Size: Adult Regular)   Pulse 63   Temp 97.9  F (36.6  C) (Oral)   Resp 18   Ht 1.74 m (5' 8.5\")   Wt 105.4 kg (232 lb 6.4 oz)   SpO2 99%   BMI 34.82 kg/m    Estimated body mass index is 34.82 kg/m  as calculated from the following:    Height as of this encounter: 1.74 m (5' 8.5\").    Weight as of this encounter: 105.4 kg (232 lb 6.4 oz). Body surface area is 2.26 meters squared.  No Pain (0) Comment: Data Unavailable   No LMP recorded. Patient has had a hysterectomy.  Allergies reviewed: Yes  Medications reviewed: Yes    Medications: Medication refills not needed today.  Pharmacy name entered into [facility]:    [id number] - [facility] [address] AT [facility] & [address] [place] [id number] [place] [facility] [address]     Clinical concerns: NO   5 minutes for nursing intake (face to face time)          Christelle Grimes MA          Medical Assistant Note:  Ila Pierre presents today for yes.    Patient seen by provider today: Yes: Dr Molina.   present during visit today: Not Applicable.    Concerns: No Concerns.    Procedure:  Lab draw site: LAC, Needle type: BF, Gauge: 21. daniella and coban applied    Post Assessment:  Labs drawn without difficulty: Yes.    Discharge Plan:  Departure Mode: Ambulatory.    Face to Face Time: 5.    Christelle Grimes MA                [facility]    Hematology/Oncology New Patient " Note      Today's Date: 01/11/19    Reason for Consult: right breast cancer      HISTORY OF PRESENT ILLNESS: Ila Pierre is a 63 year old female with PMHx of HTN, COPD who presents with right-sided breast cancer.   She undergone a screening mammogram, which found a new right breast mass.  On 12/14/18, she underwent right mastectomy and right axillary sentinel lymph node biopsy by Dr. Perales, with reconstruction by Dr. Rees.  Pathology showed infiltrating ductal carcinoma, grade 1, forming multiple foci up to 3 mm, DCIS intermediate nuclear grade, forming multiple foci.  There was intraductal papilloma with atypia identified.  ER positive (98%), AK positive (98%), HER-2 negative, margins negative, 0 of 2 lymph nodes involved, stage [id number] (stage IA).    Her mother had breast cancer and has lumpectomy at age 90.  Her sister has bilateral mastectomy at age 60 for breast cancer as well.    Winnie does not have any children.  She has had a total hysterectomy and bilateral oophorectomy and took estrogen patch for about 10 years up until her breast cancer diagnosis.  She quit smoking many years ago.  She drinks alcohol 1-2 drinks a week on weekends.  She lives with her significant other in [place].      Currently, Winnie feels well.  She has undergone 2 fills for her tissue expander.  She feels that her wounds from surgery are healing up well.        REVIEW OF SYSTEMS:   14 point ROS was reviewed and is negative other than as noted above in HPI.       HOME MEDICATIONS:  Current Outpatient Medications   Medication Sig Dispense Refill     aspirin 81 MG tablet Take 81 mg by mouth daily        atenolol (TENORMIN) 50 MG tablet TAKE 1 TABLET(50 MG) BY MOUTH DAILY (Patient taking differently: Take 50 mg by mouth every evening Takes at 18:00) 90 tablet 3     Cholecalciferol (VITAMIN D3 PO) Take 5,000 Units by mouth every evening Takes at 18:00       l-tyrosine 500 MG TABS Take 2 tablets by mouth daily.       Misc  Natural Products (GLUCOSAMINE CHOND MSM FORMULA PO) Take 1 tablet by mouth every evening (Glucosamine 500 mg Chondroitin 400 mg  mg) takes at 18:00       Multiple Vitamins-Minerals (CENTRUM SILVER) per tablet Take 1 tablet by mouth every evening        Omega-3 Fatty Acids (FISH OIL PO) Take 600 mg by mouth every evening Takes at 18:00       order for DME Equipment being ordered: post mastectomy garment.     Scheduled for right mastectomy, right SLNB, possible axillary node dissection, tissue expander on 12/14/18. 2 Units 0     Probiotic Product (PROBIOTIC DAILY PO) Take 1 capsule by mouth every evening Acidophilus with goats milk 343 mg- 10 mg.  Takes at 18:00       acyclovir (ZOVIRAX) 400 MG tablet TAKE 1 TABLET(400 MG) BY MOUTH THREE TIMES DAILY (Patient not taking: Reported on 1/11/2019) 15 tablet 3         ALLERGIES:  Allergies   Allergen Reactions     Codeine Sulfate Diarrhea     Penicillins      As a child         PAST MEDICAL HISTORY:  Past Medical History:   Diagnosis Date     Breast mass      Cancer (H)     right breast     COPD (chronic obstructive pulmonary disease) (H)      Herpes labialis      Hypertension goal BP (blood pressure) < 140/90      Menopausal symptoms      Palpitations      PONV (postoperative nausea and vomiting)          PAST SURGICAL HISTORY:  Past Surgical History:   Procedure Laterality Date     COLONOSCOPY  8-21-08     DISSECT LYMPH NODE AXILLA Right 12/14/2018    Procedure: AXILLARY NODE DISECTION ( COLT );  Surgeon: Adolfo Perales MD;  Location:  OR      TOOTH EXTRACTION W/FORCEP  1973    wisdom teeth     HYSTERECTOMY  2005    for cyst and endometiosis     HYSTERECTOMY, PAP NO LONGER INDICATED       INSERT TISSUE EXPANDER BREAST Bilateral 12/14/2018    Procedure: INSERT RIGHT  TISSUE EXPANDER BREAST ( LASHAY );  Surgeon: Mio Rodríguez MD;  Location:  OR     LEG SURGERY  1975    lump removed     MASTECTOMY SIMPLE, SENTINEL NODE, COMBINED Right 12/14/2018  "   Procedure: RIGHT MASTECTOMY WITH RIGHT SENTINEL NODE BIOPSY (  COLT );  Surgeon: Adolfo Perales MD;  Location: [facility] OR     TONSILLECTOMY  1964     TUBAL LIGATION           SOCIAL HISTORY:  Social History     Socioeconomic History     Marital status: Single     Spouse name: Not on file     Number of children: Not on file     Years of education: Not on file     Highest education level: Not on file   Social Needs     Financial resource strain: Not on file     Food insecurity - worry: Not on file     Food insecurity - inability: Not on file     Transportation needs - medical: Not on file     Transportation needs - non-medical: Not on file   Occupational History     Not on file   Tobacco Use     Smoking status: Former Smoker     Last attempt to quit: 10/19/1997     Years since quittin.2     Smokeless tobacco: Never Used   Substance and Sexual Activity     Alcohol use: Yes     Comment: 3 beer - 3 times a week     Drug use: No     Sexual activity: Yes     Partners: Male     Birth control/protection: Surgical     Comment: tubal ligation -  no children   Other Topics Concern     Parent/sibling w/ CABG, MI or angioplasty before 65F 55M? No   Social History Narrative     Not on file         FAMILY HISTORY:  Family History   Problem Relation Age of Onset     Osteoporosis Mother      Breast Cancer Mother      Prostate Cancer Father      Lipids Brother      Breast Cancer Sister          PHYSICAL EXAM:  Vital signs:  /77 (BP Location: Right arm, Patient Position: Sitting, Cuff Size: Adult Regular)   Pulse 63   Temp 97.9  F (36.6  C) (Oral)   Resp 18   Ht 1.74 m (5' 8.5\")   Wt 105.4 kg (232 lb 6.4 oz)   SpO2 99%   BMI 34.82 kg/m      ECO  GENERAL/CONSTITUTIONAL: No acute distress. Accompanied by significant other.  EYES: No scleral icterus.  LYMPH: No anterior cervical, posterior cervical, supraclavicular, axillary or inguinal adenopathy.   RESPIRATORY: Clear to auscultation bilaterally. No crackles or " "wheezing.   CARDIOVASCULAR: Regular rate and rhythm without murmurs, gallops, or rubs.  GASTROINTESTINAL: No tenderness. The patient has normal bowel sounds. No guarding.  No distention.  BREAST: Right-s/p mastectomy with tissue expander in place.  Left-no palpable mass, discharge, rash, or axillary lymphadenopathy.   MUSCULOSKELETAL: Warm and well-perfused, no cyanosis, clubbing, or edema.  NEUROLOGIC: Alert, oriented, answers questions appropriately.  INTEGUMENTARY: No jaundice.  GAIT: Steady, does not use assistive device      LABS:  CBC RESULTS:   Recent Labs   Lab Test 11/13/18  1631   WBC 7.5   RBC 4.78   HGB 15.2   HCT 46.8   MCV 98   MCH 31.8   MCHC 32.5   RDW 14.1          Recent Labs   Lab Test 12/14/18  0910 07/26/18  0809 05/12/17  0857  10/23/12  0917   NA  --  139  --   --  138   POTASSIUM 4.0 4.1  --   --  5.0   CHLORIDE  --  108  --   --  102   CO2  --  24  --   --  28   ANIONGAP  --  7  --   --  8.1   GLC  --  87 87   < > 89   BUN  --  13  --   --  13   CR 0.60 0.72  --   --  0.90   RAMONITA  --  8.5  --   --  8.5    < > = values in this interval not displayed.         PATHOLOGY:  12/14/18:  FINAL DIAGNOSIS:   A: Lymph node, right axillary sentinel, biopsy: Negative for malignancy.     B: Lymph node, right axillary sentinel #2, biopsy: Negative for   malignancy.     C: Breast, right, mastectomy:   1. Infiltrating ductal carcinoma (\"invasive carcinoma of no special type\"   per WHO classification), Fischer   grade 1 (of 3), forming multiple foci up to 3 mm in greatest dimension.   2. Ductal carcinoma in situ (DCIS), intermediate nuclear grade, forming   multiple foci.   3. Intraductal papilloma with atypia identified.   4. Multiple biopsy sites identified.   5. Estrogen receptor (ER) immunohistochemistry studies on the invasive   lesion were previously reported as   positive, 98%, and progesterone receptor (NV) immunohistochemistry studies    were previously reported as   positive, 98% (see case " [id number]).   6. Margins negative for malignancy.     See synoptic summary.     Part(s) Involved:   C: Mastectomy, right breast     Synoptic Report:     SPECIMEN     Procedure:         - Total mastectomy     Specimen Laterality:         - Right     TUMOR     Histologic Type:         - Invasive carcinoma of no special type (ductal, not otherwise   specified)     Histologic Grade (Holiday Histologic Score)       Glandular (Acinar) / Tubular Differentiation:           - Score 1       Nuclear Pleomorphism:           - Score 2       Mitotic Rate:           - Score 1 (<=3 mitoses per mm2)       Overall Grade:           - Grade 1 (scores of 3, 4 or 5)     Tumor Size: 3 Millimeters (mm)     Ductal Carcinoma In Situ (DCIS):         - DCIS is present in specimen     Accessory Findings       Treatment Effect:           - No known presurgical therapy     MARGINS     Invasive Carcinoma Margins:         - Uninvolved by invasive carcinoma       Distance from Closest Margin in Millimeters (mm):           - Distance is > 10 Millimeters (mm)       Closest Margin:           Upper anterior     DCIS Margins:         - Uninvolved by DCIS       Distance of DCIS from Closest Margin (mm):           - Distance is < 1 Millimeter (mm)       Closest Margin:           - Posterior     LYMPH NODES     Number of Lymph Nodes with Macrometastases (> 2 mm): 0     Number of Lymph Nodes with Micrometastases (> 0.2 mm to 2 mm and / or >   200     cells): 0     Number of Lymph Nodes with Isolated Tumor Cells (<= 0.2 mm and <= 200   cells): 0     Number of Lymph Nodes Examined: 2     Number of [place] Nodes Examined: 2     PATHOLOGIC STAGE CLASSIFICATION (PTNM, AJCC 8TH EDITION)     TNM Descriptors:         - m (multiple foci of invasive carcinoma)     Primary Tumor (Invasive Carcinoma) (pT):         - pT1a     Regional Lymph Nodes (pN)       Modifier:           - (sn): Only sentinel node(s) evaluated.       Category (pN):           - pN0     COMMENTS     Estrogen receptor (ER) immunohistochemistry studies on the invasive         lesion were previously reported as positive, 98%, and progesterone         receptor (OK) immunohistochemistry studies were previously reported   as         positive, 98% (see case [id number]).       IMAGING:  Bilateral mammogram 10/31/18:  BREAST DENSITY: Heterogeneously dense.     COMMENTS: Possible developing architectural distortion in the RIGHT  breast at approximately 8:00-10:00 position, 6 to 7 cm from the  nipple. Also, there is an enlarged RIGHT axillary lymph node. No  concerning findings in the left breast.                                                                       IMPRESSION: BI-RADS CATEGORY: 0 - Need Additional Imaging Evaluation  and/or Prior Mammograms for Comparison.    Right breast ultrasound 11/1/18:  FINDINGS:  Targeted ultrasound was performed. At the 10:00 position of  the right breast, 7 cm from the nipple, there is an ill-defined area  of hypoechoic tissue which measures approximately 2.8 cm in maximum  dimension.     Survey of the right axilla shows a enlarged, partially  cystic-appearing mass measuring 1.9 x 1.0 x 1.1 cm.                                                                       IMPRESSION: BI-RADS CATEGORY: 4 - Suspicious Abnormality-Biopsy Should  Be Considered.  Ill-defined hypoechoic tissue at the 10:00 position of the right  breast and a partially cystic right axillary mass. Ultrasound-guided  core biopsy x2 is recommended. Results and recommendation were  discussed with the patient during her appointment.    MRI breast 11/18/18:  1. Segmental distribution of clumped non-mass enhancement encompassing  nearly the entire upper outer quadrant of the RIGHT breast measures up  to 14.4 cm in maximal dimension and comes to within 1.5 cm of the  nipple. Given the biopsy results, this is concerning for DCIS. If it  would be of clinical use, additional biopsy could be performed more  anteriorly in  the breast to more definitively establish the extent of  disease prior to surgery. If this is desired, recommend additional  evaluation with ultrasound and possible biopsy, but if no target is  found, MRI guided biopsy could be performed.  2. No concerning areas of enhancement in the LEFT breast.  3. No lymphadenopathy.      ASSESSMENT/PLAN:  Ila Pierre is a 63 year old female with:    1) Right-sided breast cancer, upper outer quadrant: On 12/14/18, she underwent right mastectomy and right axillary sentinel lymph node biopsy, with reconstruction.  Pathology showed infiltrating ductal carcinoma, grade 1, forming multiple foci up to 3 mm, DCIS intermediate nuclear grade, forming multiple foci.  There was intraductal papilloma with atypia identified.  ER positive (98%), AZ positive (98%), HER-2 negative, margins negative, 0 of 2 lymph nodes involved, stage [id number] (stage IA).    We reviewed the pathology results.  I did have the tumor sent for Oncotype DX testing, which is still pending.  With the small strongly ER and AZ positive, low grade tumor, hopefully, she will have a low risk score and not require adjuvant chemotherapy.  However, we will await those results.  She had a mastectomy and negative lymph nodes, so she will not need adjuvant radiation.  We did discuss hormonal therapy with aromatase inhibitor.    -awaiting Oncotype DX results.  Hopefully, this will result by next week, and I can call her with results  -if low risk score, she can start on anastrozole  -she underwent teaching on anastrozole today.   We discussed potential side effects, including myalgias/arthralgias, bone density loss/osteoporosis, mood changes, hot flashes, weight gain, headache, fatigue, small cardiovascular risk.  -I discussed the schedule, regimen, dose, possible side effects, the benefits and risks, and patient agrees to treatment plan.  -labs today    2) Bone health: She last had a DEXA scan in May 2012 that was normal.   With her plans to start an aromatase inhibitor, we will obtain another DEXA scan.  -DEXA scan  -she will take calcium and vitamin D      I spent a total of 60 minutes with the patient, with over >50% of the time in counseling and/or coordination of care.       Belkis Molina MD  Hematology/Oncology  [facility]  ain, thank you for allowing me to participate in the care of your patient.        Sincerely,        Belkis Molina MD

## 2019-01-14 ENCOUNTER — HOSPITAL ENCOUNTER (OUTPATIENT)
Dept: BONE DENSITY | Facility: CLINIC | Age: 64
Discharge: HOME | End: 2019-01-14
Attending: PHYSICIAN ASSISTANT | Admitting: PHYSICIAN ASSISTANT
Payer: COMMERCIAL

## 2019-01-14 ENCOUNTER — TRANSFERRED RECORDS (OUTPATIENT)
Dept: HEALTH INFORMATION MANAGEMENT | Facility: CLINIC | Age: 64
End: 2019-01-14

## 2019-01-14 DIAGNOSIS — C50.811 MALIGNANT NEOPLASM OF OVERLAPPING SITES OF RIGHT BREAST IN FEMALE, ESTROGEN RECEPTOR POSITIVE (H): ICD-10-CM

## 2019-01-14 DIAGNOSIS — Z17.0 MALIGNANT NEOPLASM OF OVERLAPPING SITES OF RIGHT BREAST IN FEMALE, ESTROGEN RECEPTOR POSITIVE (H): ICD-10-CM

## 2019-01-14 PROCEDURE — 77080 DXA BONE DENSITY AXIAL: CPT

## 2019-01-15 ENCOUNTER — TELEPHONE (OUTPATIENT)
Dept: ONCOLOGY | Facility: CLINIC | Age: 64
End: 2019-01-15

## 2019-01-15 DIAGNOSIS — C50.911 MALIGNANT NEOPLASM OF RIGHT BREAST IN FEMALE, ESTROGEN RECEPTOR POSITIVE, UNSPECIFIED SITE OF BREAST (H): Primary | ICD-10-CM

## 2019-01-15 DIAGNOSIS — Z17.0 MALIGNANT NEOPLASM OF RIGHT BREAST IN FEMALE, ESTROGEN RECEPTOR POSITIVE, UNSPECIFIED SITE OF BREAST (H): Primary | ICD-10-CM

## 2019-01-15 RX ORDER — ANASTROZOLE 1 MG/1
1 TABLET ORAL DAILY
Qty: 90 TABLET | Refills: 3 | Status: SHIPPED | OUTPATIENT
Start: 2019-01-15 | End: 2019-06-11

## 2019-01-15 NOTE — TELEPHONE ENCOUNTER
I called Winnie with Oncotype DX results.  She has a low risk score of 10, so chemotherapy would not be of benefit.  We discussed anti-hormonal therapy with anastrozole.  She is concerned about mood changes.  She is willing to try it.  If she decides that it is not good for her quality of life, she may stop it.      -anastrozole 1 mg daily sent to her pharmacy  -follow-up in clinic ~4-6 weeks after starting the medication for toxicity check

## 2019-02-21 ENCOUNTER — OFFICE VISIT (OUTPATIENT)
Dept: FAMILY MEDICINE | Facility: CLINIC | Age: 64
End: 2019-02-21
Payer: COMMERCIAL

## 2019-02-21 VITALS
RESPIRATION RATE: 14 BRPM | TEMPERATURE: 98.2 F | DIASTOLIC BLOOD PRESSURE: 82 MMHG | BODY MASS INDEX: 33.86 KG/M2 | SYSTOLIC BLOOD PRESSURE: 142 MMHG | OXYGEN SATURATION: 97 % | HEART RATE: 64 BPM | WEIGHT: 226 LBS

## 2019-02-21 DIAGNOSIS — N76.0 VAGINITIS AND VULVOVAGINITIS: ICD-10-CM

## 2019-02-21 DIAGNOSIS — Z17.0 MALIGNANT NEOPLASM OF RIGHT BREAST IN FEMALE, ESTROGEN RECEPTOR POSITIVE, UNSPECIFIED SITE OF BREAST (H): ICD-10-CM

## 2019-02-21 DIAGNOSIS — R30.0 DYSURIA: ICD-10-CM

## 2019-02-21 DIAGNOSIS — C50.911 MALIGNANT NEOPLASM OF RIGHT BREAST IN FEMALE, ESTROGEN RECEPTOR POSITIVE, UNSPECIFIED SITE OF BREAST (H): ICD-10-CM

## 2019-02-21 DIAGNOSIS — B37.31 CANDIDIASIS OF VAGINA: Primary | ICD-10-CM

## 2019-02-21 PROBLEM — N63.10 BREAST MASS, RIGHT: Status: RESOLVED | Noted: 2018-11-13 | Resolved: 2019-02-21

## 2019-02-21 LAB
ALBUMIN UR-MCNC: NEGATIVE MG/DL
APPEARANCE UR: CLEAR
BILIRUB UR QL STRIP: NEGATIVE
COLOR UR AUTO: YELLOW
GLUCOSE UR STRIP-MCNC: NEGATIVE MG/DL
HGB UR QL STRIP: NEGATIVE
KETONES UR STRIP-MCNC: NEGATIVE MG/DL
LEUKOCYTE ESTERASE UR QL STRIP: NEGATIVE
Lab: ABNORMAL
NITRATE UR QL: NEGATIVE
PH UR STRIP: 5.5 PH (ref 5–7)
SOURCE: NORMAL
SP GR UR STRIP: <=1.005 (ref 1–1.03)
SPECIMEN SOURCE: ABNORMAL
UROBILINOGEN UR STRIP-ACNC: 0.2 EU/DL (ref 0.2–1)
WET PREP SPEC: ABNORMAL

## 2019-02-21 PROCEDURE — 87210 SMEAR WET MOUNT SALINE/INK: CPT | Performed by: PHYSICIAN ASSISTANT

## 2019-02-21 PROCEDURE — 99213 OFFICE O/P EST LOW 20 MIN: CPT | Performed by: PHYSICIAN ASSISTANT

## 2019-02-21 PROCEDURE — 87086 URINE CULTURE/COLONY COUNT: CPT | Performed by: PHYSICIAN ASSISTANT

## 2019-02-21 PROCEDURE — 81003 URINALYSIS AUTO W/O SCOPE: CPT | Performed by: PHYSICIAN ASSISTANT

## 2019-02-21 RX ORDER — FLUCONAZOLE 150 MG/1
150 TABLET ORAL ONCE
Qty: 2 TABLET | Refills: 0 | Status: SHIPPED | OUTPATIENT
Start: 2019-02-21 | End: 2019-02-28

## 2019-02-21 NOTE — LETTER
February 23, 2019      Ila Pierre  [address] [facility] [address]        Dear ,    We are writing to inform you of your test results.    Urine culture does not show evidence for infection    Resulted Orders   Urine Culture Aerobic Bacterial   Result Value Ref Range    Specimen Description Midstream Urine     Culture Micro       10,000 to 50,000 colonies/mL  urogenital edson  Susceptibility testing not routinely done         If you have any questions or concerns, please call the clinic at the number listed above.       Sincerely,        Aleshia Meléndez PA-C

## 2019-02-21 NOTE — PROGRESS NOTES
SUBJECTIVE:   Ila Pierre is a 63 year old female who presents to clinic today for the following health issues:    URINARY TRACT SYMPTOMS  Onset: friday    Description:   Painful urination (Dysuria): no   Blood in urine (Hematuria): no   Delay in urine (Hesitency): no     Intensity: mild, moderate    Progression of Symptoms:  same    Accompanying Signs & Symptoms:  Fever/chills: no   Flank pain no   Nausea and vomiting: no   Any vaginal symptoms: none  Abdominal/Pelvic Pain: no     History:   History of frequent UTI's: no   History of kidney stones: no   Sexually Active: YES  Possibility of pregnancy: No    Precipitating factors:   n/a    Frequency and urgency to use the restroom    Therapies Tried and outcome: n/a  Reviewed and updated as needed this visit by clinical staff  Tobacco  Allergies  Meds  Problems  Med Hx  Surg Hx  Fam Hx  Soc Hx        Reviewed and updated as needed this visit by Provider  Tobacco  Allergies  Meds  Problems  Med Hx  Surg Hx  Fam Hx  Soc Hx        Additional complaints: Pelvic exam    HPI additional notes: Winnie presents today with   Chief Complaint   Patient presents with     UTI     Pt also on anastrozole for breast cancer and needs an annual pelvic exam.  She has had a previous total hysterectomy and does not need a pap smear today.     ROS:  C: Negative for fever, chills, recent change in weight  CV: Negative for chest pain or peripheral edema  : POSITIVE for frequency, urgency, and dysuria and painful intercourse  P: Negative for changes in mood or affect  ROS otherwise negative.    Chart Review:    PFSH: Patient is sexually active.       History   Smoking Status     Former Smoker     Quit date: 10/19/1997   Smokeless Tobacco     Never Used     No flowsheet data found.  No flowsheet data found.    Patient Active Problem List   Diagnosis     Palpitations     Diverticula of colon     Essential hypertension with goal blood pressure less than 140/90      Symptomatic menopausal or female climacteric states     Non morbid obesity due to excess calories     Advanced directives, counseling/discussion     Chronic obstructive pulmonary disease, unspecified COPD type (H)     Right foot pain     Breast cancer (H)     Past Surgical History:   Procedure Laterality Date     COLONOSCOPY  8-21-08     DISSECT LYMPH NODE AXILLA Right 12/14/2018    Procedure: AXILLARY NODE DISECTION ( COLT );  Surgeon: Adolfo Perales MD;  Location:  OR     HC TOOTH EXTRACTION W/FORCEP  1973    wisdom teeth     HYSTERECTOMY  2005    for cyst and endometiosis     HYSTERECTOMY, PAP NO LONGER INDICATED       INSERT TISSUE EXPANDER BREAST Bilateral 12/14/2018    Procedure: INSERT RIGHT  TISSUE EXPANDER BREAST ( LASHAY );  Surgeon: Mio Rodríguez MD;  Location:  OR     LEG SURGERY  1975    lump removed     MASTECTOMY SIMPLE, SENTINEL NODE, COMBINED Right 12/14/2018    Procedure: RIGHT MASTECTOMY WITH RIGHT SENTINEL NODE BIOPSY (  COLT );  Surgeon: Adolfo Perales MD;  Location:  OR     TONSILLECTOMY  1964     TUBAL LIGATION  1993     Problem list, Medication list, Allergies, Medical/Social/Surg hx reviewed in [facility], updated as appropriate.   OBJECTIVE:                                                    /82   Pulse 64   Temp 98.2  F (36.8  C) (Tympanic)   Resp 14   Wt 102.5 kg (226 lb)   SpO2 97%   BMI 33.86 kg/m    Body mass index is 33.86 kg/m .  GENERAL: healthy, alert, in no acute distress  HENT: Mucous mebranes moist.  RESP: lungs clear to auscultation - no rales, no rhonchi, no wheezes  CV: regular rate and rhythm, normal S1 S2.  No peripheral edema.  ABDOMEN: soft, nontender, no hepatosplenomegaly or masses. Normal bowel sounds in all four quadrants.  MS: no gross deformities noted.  No CVA tenderness.  SKIN: no suspicious lesions, no rashes  - female: normal post-hysterectomy exam without masses, white thick discharge  PSYCH: Alert and oriented times 3;  Able to  articulate logical thoughts. Affect is normal.    Diagnostic test results:   Results for orders placed or performed in visit on 02/21/19 (from the past 24 hour(s))   UA reflex to Microscopic and Culture   Result Value Ref Range    Color Urine Yellow     Appearance Urine Clear     Glucose Urine Negative NEG^Negative mg/dL    Bilirubin Urine Negative NEG^Negative    Ketones Urine Negative NEG^Negative mg/dL    Specific Gravity Urine <=1.005 1.003 - 1.035    Blood Urine Negative NEG^Negative    pH Urine 5.5 5.0 - 7.0 pH    Protein Albumin Urine Negative NEG^Negative mg/dL    Urobilinogen Urine 0.2 0.2 - 1.0 EU/dL    Nitrite Urine Negative NEG^Negative    Leukocyte Esterase Urine Negative NEG^Negative    Source Midstream Urine    Wet prep   Result Value Ref Range    Specimen Description Midstream Urine     Special Requests Midstream Urine     Wet Prep No Trichomonas seen     Wet Prep No clue cells seen     Wet Prep Yeast seen (A)         ASSESSMENT/PLAN:                                                          ICD-10-CM    1. Candidiasis of vagina B37.3    2. Dysuria R30.0 UA reflex to Microscopic and Culture     Urine Culture Aerobic Bacterial   3. Malignant neoplasm of right breast in female, estrogen receptor positive, unspecified site of breast (H) C50.911     Z17.0    4. Vaginitis and vulvovaginitis N76.0 Wet prep     fluconazole (DIFLUCAN) 150 MG tablet     normal pelvic exam today.  Pt wet prep was positive for yeast which is likely why she was having painful intercourse.  Discussed that since she has not had sex in a while she should try to stretch the walls of the vaginal canal before intercourse to prevent pain.  Hopefully treatment of her yeast infection will help.    Due to urine symptoms, will send for culture.    Please see patient instructions for treatment details.    Return in about 1 week (around 2/28/2019) for Pre op.    Aleshia Meléndez PA-C  [facility]

## 2019-02-22 LAB
BACTERIA SPEC CULT: NORMAL
SPECIMEN SOURCE: NORMAL

## 2019-02-28 ENCOUNTER — OFFICE VISIT (OUTPATIENT)
Dept: FAMILY MEDICINE | Facility: CLINIC | Age: 64
End: 2019-02-28
Payer: COMMERCIAL

## 2019-02-28 VITALS
RESPIRATION RATE: 16 BRPM | SYSTOLIC BLOOD PRESSURE: 114 MMHG | BODY MASS INDEX: 33.47 KG/M2 | WEIGHT: 226 LBS | HEIGHT: 69 IN | TEMPERATURE: 97 F | DIASTOLIC BLOOD PRESSURE: 68 MMHG | OXYGEN SATURATION: 100 % | HEART RATE: 73 BPM

## 2019-02-28 DIAGNOSIS — C50.911 MALIGNANT NEOPLASM OF RIGHT BREAST IN FEMALE, ESTROGEN RECEPTOR POSITIVE, UNSPECIFIED SITE OF BREAST (H): ICD-10-CM

## 2019-02-28 DIAGNOSIS — Z01.818 PREOP GENERAL PHYSICAL EXAM: Primary | ICD-10-CM

## 2019-02-28 DIAGNOSIS — Z90.11 HISTORY OF RIGHT MASTECTOMY: ICD-10-CM

## 2019-02-28 DIAGNOSIS — Z17.0 MALIGNANT NEOPLASM OF RIGHT BREAST IN FEMALE, ESTROGEN RECEPTOR POSITIVE, UNSPECIFIED SITE OF BREAST (H): ICD-10-CM

## 2019-02-28 LAB
ERYTHROCYTE [DISTWIDTH] IN BLOOD BY AUTOMATED COUNT: 14.1 % (ref 10–15)
HCT VFR BLD AUTO: 44.5 % (ref 35–47)
HGB BLD-MCNC: 14.3 G/DL (ref 11.7–15.7)
MCH RBC QN AUTO: 31 PG (ref 26.5–33)
MCHC RBC AUTO-ENTMCNC: 32.1 G/DL (ref 31.5–36.5)
MCV RBC AUTO: 97 FL (ref 78–100)
PLATELET # BLD AUTO: 400 10E9/L (ref 150–450)
RBC # BLD AUTO: 4.61 10E12/L (ref 3.8–5.2)
WBC # BLD AUTO: 7.9 10E9/L (ref 4–11)

## 2019-02-28 PROCEDURE — 99214 OFFICE O/P EST MOD 30 MIN: CPT | Performed by: PHYSICIAN ASSISTANT

## 2019-02-28 PROCEDURE — 36415 COLL VENOUS BLD VENIPUNCTURE: CPT | Performed by: PHYSICIAN ASSISTANT

## 2019-02-28 PROCEDURE — 85027 COMPLETE CBC AUTOMATED: CPT | Performed by: PHYSICIAN ASSISTANT

## 2019-02-28 ASSESSMENT — MIFFLIN-ST. JEOR: SCORE: 1641.57

## 2019-02-28 NOTE — PROGRESS NOTES
[facility]  [address]  [facility] [address]  [phone]  Dept: [phone]    PRE-OP EVALUATION:  Today's date: 2019    Ila Pierre (: 1955) presents for pre-operative evaluation assessment as requested by Dr. Rodríguez.  She requires evaluation and anesthesia risk assessment prior to undergoing surgery/procedure for treatment of reconstruction surgery of breast    Proposed Surgery/ Procedure: reconstruction surgery  Date of Surgery/ Procedure: 3/15/19  Time of Surgery/ Procedure: 11:00, [facility]  Hospital/Surgical Facility: [facility], Day surgery  Fax number for surgical facility:   Primary Physician: Aleshia Meléndez  Type of Anesthesia Anticipated: General    Patient has a Health Care Directive or Living Will:  YES     1. NO - Do you have a history of heart attack, stroke, stent, bypass or surgery on an artery in the head, neck, heart or legs?  2. NO - Do you ever have any pain or discomfort in your chest?  3. NO - Do you have a history of  Heart Failure?  4. NO - Are you troubled by shortness of breath when: walking on the level, up a slight hill or at night?  5. NO - Do you currently have a cold, bronchitis or other respiratory infection?  6. NO - Do you have a cough, shortness of breath or wheezing?  7. NO - Do you sometimes get pains in the calves of your legs when you walk?  8. NO - Do you or anyone in your family have previous history of blood clots?  9. NO - Do you or does anyone in your family have a serious bleeding problem such as prolonged bleeding following surgeries or cuts?  10. NO - Have you ever had problems with anemia or been told to take iron pills?  11. NO - Have you had any abnormal blood loss such as black, tarry or bloody stools, or abnormal vaginal bleeding?  12. NO - Have you ever had a blood transfusion?  13. YES - Have you or any of your relatives ever had problems with anesthesia?-Self, nausea and  vomiting  14. NO - Do you have sleep apnea, excessive snoring or daytime drowsiness?  15. NO - Do you have any prosthetic heart valves?  16. NO - Do you have prosthetic joints?  17. NO - Is there any chance that you may be pregnant?      HPI:     HPI related to upcoming procedure:  History of simple mastectomy in December for breast cancer.      See problem list for active medical problems.  Problems all longstanding and stable, except as noted/documented.  See ROS for pertinent symptoms related to these conditions.            MEDICAL HISTORY:     Patient Active Problem List    Diagnosis Date Noted     Breast cancer (H) 12/14/2018     Priority: Medium     Right foot pain 05/12/2017     Priority: Medium     Advanced directives, counseling/discussion 02/16/2015     Priority: Medium     Advance Care Planning:   ACP Review and Resources Provided:  Reviewed chart for advance care plan.  Ila Pierre has no plan or code status on file. Discussed available resources and provided with information. Pt states she has a HCD at home and will bring in a copy.    Added by Susan Isaac on 2/16/2015     Non morbid obesity due to excess calories 02/06/2015     Priority: Medium     Chronic obstructive pulmonary disease, unspecified COPD type (H) 02/05/2015     Priority: Medium     Symptomatic menopausal or female climacteric states 11/03/2014     Priority: Medium     Essential hypertension with goal blood pressure less than 140/90 08/20/2013     Priority: Medium     Diverticula of colon 03/22/2013     Priority: Medium     Palpitations 10/18/2012     Priority: Medium      Past Medical History:   Diagnosis Date     Breast mass      Cancer (H)     right breast     COPD (chronic obstructive pulmonary disease) (H)      Herpes labialis      Hypertension goal BP (blood pressure) < 140/90      Menopausal symptoms      Palpitations      PONV (postoperative nausea and vomiting)      Past Surgical History:   Procedure Laterality Date      COLONOSCOPY  8-21-08     DISSECT LYMPH NODE AXILLA Right 12/14/2018    Procedure: AXILLARY NODE DISECTION ( COLT );  Surgeon: Adolfo Perales MD;  Location:  OR     HC TOOTH EXTRACTION W/FORCEP  1973    wisdom teeth     HYSTERECTOMY  2005    for cyst and endometiosis     HYSTERECTOMY, PAP NO LONGER INDICATED       INSERT TISSUE EXPANDER BREAST Bilateral 12/14/2018    Procedure: INSERT RIGHT  TISSUE EXPANDER BREAST ( LASHAY );  Surgeon: Mio Rodríguez MD;  Location:  OR     LEG SURGERY  1975    lump removed     MASTECTOMY SIMPLE, SENTINEL NODE, COMBINED Right 12/14/2018    Procedure: RIGHT MASTECTOMY WITH RIGHT SENTINEL NODE BIOPSY (  COLT );  Surgeon: Adolfo Perales MD;  Location:  OR     TONSILLECTOMY  1964     TUBAL LIGATION  1993     Current Outpatient Medications   Medication Sig Dispense Refill     anastrozole (ARIMIDEX) 1 MG tablet Take 1 tablet (1 mg) by mouth daily 90 tablet 3     aspirin 81 MG tablet Take 81 mg by mouth daily        atenolol (TENORMIN) 50 MG tablet TAKE 1 TABLET(50 MG) BY MOUTH DAILY (Patient taking differently: Take 50 mg by mouth every evening Takes at 18:00) 90 tablet 3     Cholecalciferol (VITAMIN D3 PO) Take 5,000 Units by mouth every evening Takes at 18:00       l-tyrosine 500 MG TABS Take 2 tablets by mouth daily.       Misc Natural Products (GLUCOSAMINE CHOND MSM FORMULA PO) Take 1 tablet by mouth every evening (Glucosamine 500 mg Chondroitin 400 mg  mg) takes at 18:00       Multiple Vitamins-Minerals (CENTRUM SILVER) per tablet Take 1 tablet by mouth every evening        Omega-3 Fatty Acids (FISH OIL PO) Take 600 mg by mouth every evening Takes at 18:00       order for DME Equipment being ordered: post mastectomy garment.     Scheduled for right mastectomy, right SLNB, possible axillary node dissection, tissue expander on 12/14/18. 2 Units 0     Probiotic Product (PROBIOTIC DAILY PO) Take 1 capsule by mouth every evening Acidophilus with goats milk  "343 mg- 10 mg.  Takes at 18:00       OTC products: None, except as noted above    Allergies   Allergen Reactions     Penicillins      As a child     Codeine Sulfate Diarrhea      Latex Allergy: NO    Social History     Tobacco Use     Smoking status: Former Smoker     Last attempt to quit: 10/19/1997     Years since quittin.3     Smokeless tobacco: Never Used   Substance Use Topics     Alcohol use: Yes     Comment: 3 beer - 3 times a week     History   Drug Use No       REVIEW OF SYSTEMS:   CONSTITUTIONAL: NEGATIVE for fever, chills, change in weight  INTEGUMENTARY/SKIN: NEGATIVE for worrisome rashes, moles or lesions  EYES: NEGATIVE for vision changes or irritation  ENT/MOUTH: NEGATIVE for ear, mouth and throat problems  RESP: NEGATIVE for significant cough or SOB  BREAST: NEGATIVE for masses, tenderness or discharge, history of right mastectomy  CV: NEGATIVE for chest pain, palpitations or peripheral edema  GI: NEGATIVE for nausea, abdominal pain, heartburn, or change in bowel habits  : NEGATIVE for frequency, dysuria, or hematuria  MUSCULOSKELETAL: NEGATIVE for significant arthralgias or myalgia  NEURO: NEGATIVE for weakness, dizziness or paresthesias  ENDOCRINE: NEGATIVE for temperature intolerance, skin/hair changes  HEME: NEGATIVE for bleeding problems  PSYCHIATRIC: NEGATIVE for changes in mood or affect    EXAM:   /68   Pulse 73   Temp 97  F (36.1  C) (Tympanic)   Resp 16   Ht 1.74 m (5' 8.5\")   Wt 102.5 kg (226 lb)   SpO2 100%   BMI 33.86 kg/m      GENERAL APPEARANCE: healthy, alert and no distress     EYES: EOMI, PERRL     HENT: ear canals and TM's normal and nose and mouth without ulcers or lesions     NECK: no adenopathy, no asymmetry, masses, or scars and thyroid normal to palpation     RESP: lungs clear to auscultation - no rales, rhonchi or wheezes     CV: regular rates and rhythm, normal S1 S2, no S3 or S4 and no murmur, click or rub     ABDOMEN:  soft, nontender, no HSM or masses " and bowel sounds normal     MS: extremities normal- no gross deformities noted, no evidence of inflammation in joints, FROM in all extremities.     SKIN: no suspicious lesions or rashes     NEURO: Normal strength and tone, sensory exam grossly normal, mentation intact and speech normal     PSYCH: mentation appears normal. and affect normal/bright     LYMPHATICS: No cervical adenopathy    DIAGNOSTICS:     EKG: appears normal, NSR, normal axis, normal intervals, no acute ST/T changes c/w ischemia, no LVH by voltage criteria, there are no prior tracings available  Labs Resulted Today:   Results for orders placed or performed in visit on 02/28/19   CBC with platelets   Result Value Ref Range    WBC 7.9 4.0 - 11.0 10e9/L    RBC Count 4.61 3.8 - 5.2 10e12/L    Hemoglobin 14.3 11.7 - 15.7 g/dL    Hematocrit 44.5 35.0 - 47.0 %    MCV 97 78 - 100 fl    MCH 31.0 26.5 - 33.0 pg    MCHC 32.1 31.5 - 36.5 g/dL    RDW 14.1 10.0 - 15.0 %    Platelet Count 400 150 - 450 10e9/L       Recent Labs   Lab Test 01/11/19  1534 12/14/18  0910 11/13/18  1631 07/26/18  0809   HGB 14.0  --  15.2  --      --  337  --      --   --  139   POTASSIUM 4.3 4.0  --  4.1   CR 0.66 0.60  --  0.72        IMPRESSION:   Reason for surgery/procedure: Breast reconstruction  Diagnosis/reason for consult: Evaluation and anesthesia risk assessment prior to breast reconstruction      The proposed surgical procedure is considered INTERMEDIATE risk.    REVISED CARDIAC RISK INDEX  The patient has the following serious cardiovascular risks for perioperative complications such as (MI, PE, VFib and 3  AV Block):  No serious cardiac risks  INTERPRETATION: 0 risks: Class I (very low risk - 0.4% complication rate)    The patient has the following additional risks for perioperative complications:  No identified additional risks      ICD-10-CM    1. Preop general physical exam Z01.818 CBC with platelets   2. Malignant neoplasm of right breast in female,  estrogen receptor positive, unspecified site of breast (H) C50.911     Z17.0    3. History of right mastectomy Z90.11        RECOMMENDATIONS:       --Patient is to take all scheduled medications on the day of surgery.    APPROVAL GIVEN to proceed with proposed procedure, without further diagnostic evaluation       Signed Electronically by: Aleshia Meléndez PA-C    Copy of this evaluation report is provided to requesting physician.    [place] Preop Guidelines    Revised Cardiac Risk Index

## 2019-03-01 ENCOUNTER — TELEPHONE (OUTPATIENT)
Dept: FAMILY MEDICINE | Facility: CLINIC | Age: 64
End: 2019-03-01

## 2019-03-01 NOTE — TELEPHONE ENCOUNTER
Reason for Call:  Other Patient called with pre-op information    Detailed comments: Winnie called to ask that her pre-op information be sent to: [facility], Fax: [phone]    Surgery date is: 3/15/2019 at 10:45am    Phone Number Patient can be reached at: Cell number on file:    Telephone Information:   Mobile [phone]       Best Time: call Winnie for any questions.     Can we leave a detailed message on this number? YES    Call taken on 3/1/2019 at 4:46 PM by Carli Cruz

## 2019-03-08 ENCOUNTER — ONCOLOGY VISIT (OUTPATIENT)
Dept: ONCOLOGY | Facility: CLINIC | Age: 64
End: 2019-03-08
Attending: INTERNAL MEDICINE
Payer: COMMERCIAL

## 2019-03-08 VITALS
WEIGHT: 228 LBS | BODY MASS INDEX: 33.77 KG/M2 | SYSTOLIC BLOOD PRESSURE: 144 MMHG | TEMPERATURE: 97.6 F | HEART RATE: 72 BPM | HEIGHT: 69 IN | DIASTOLIC BLOOD PRESSURE: 81 MMHG | OXYGEN SATURATION: 99 %

## 2019-03-08 DIAGNOSIS — C50.411 MALIGNANT NEOPLASM OF UPPER-OUTER QUADRANT OF RIGHT BREAST IN FEMALE, ESTROGEN RECEPTOR POSITIVE (H): Primary | ICD-10-CM

## 2019-03-08 DIAGNOSIS — Z17.0 MALIGNANT NEOPLASM OF UPPER-OUTER QUADRANT OF RIGHT BREAST IN FEMALE, ESTROGEN RECEPTOR POSITIVE (H): Primary | ICD-10-CM

## 2019-03-08 PROCEDURE — G0463 HOSPITAL OUTPT CLINIC VISIT: HCPCS

## 2019-03-08 PROCEDURE — 99214 OFFICE O/P EST MOD 30 MIN: CPT | Performed by: INTERNAL MEDICINE

## 2019-03-08 ASSESSMENT — MIFFLIN-ST. JEOR: SCORE: 1650.64

## 2019-03-08 ASSESSMENT — PAIN SCALES - GENERAL: PAINLEVEL: SEVERE PAIN (6)

## 2019-03-08 NOTE — PROGRESS NOTES
"Oncology Rooming Note    March 8, 2019 8:13 AM   Ila Pierre is a 63 year old female who presents for:    Chief Complaint   Patient presents with     Oncology Clinic Visit     Initial Vitals: /81 (BP Location: Right arm, Patient Position: Chair, Cuff Size: Adult Large)   Pulse 72   Temp 97.6  F (36.4  C) (Oral)   Ht 1.74 m (5' 8.5\")   Wt 103.4 kg (228 lb)   SpO2 99%   BMI 34.16 kg/m   Estimated body mass index is 34.16 kg/m  as calculated from the following:    Height as of this encounter: 1.74 m (5' 8.5\").    Weight as of this encounter: 103.4 kg (228 lb). Body surface area is 2.24 meters squared.  Severe Pain (6) Comment: Data Unavailable   No LMP recorded. Patient has had a hysterectomy.  Allergies reviewed: Yes  Medications reviewed: Yes    Medications: Medication refills not needed today.  Pharmacy name entered into [employer]: [facility] [id number] [place] [address]    Clinical concerns:  Dr. Molina was notified.      Nichole Hernández, [facility]            "

## 2019-03-08 NOTE — PROGRESS NOTES
[facility]    Hematology/Oncology Established Patient Note      Today's Date: 03/08/19    Reason for Follow-up: right breast cancer      HISTORY OF PRESENT ILLNESS: Ila Pierre is a 63 year old female with PMHx of HTN, COPD who presents with right-sided breast cancer.   She undergone a screening mammogram, which found a new right breast mass.  On 12/14/18, she underwent right mastectomy and right axillary sentinel lymph node biopsy by Dr. Perales, with reconstruction by Dr. Rees.  Pathology showed infiltrating ductal carcinoma, grade 1, forming multiple foci up to 3 mm, DCIS intermediate nuclear grade, forming multiple foci.  There was intraductal papilloma with atypia identified.  ER positive (98%), WA positive (98%), HER-2 negative, margins negative, 0 of 2 lymph nodes involved, stage [id number] (stage IA). Oncotype DX score is 10.    She started anastrozole around 1/15/19.      INTERIM HISTORY: Winnie is here for follow-up today.  She says that she is doing well.  She started taking anastrozole and says that she overall feels well.  She notes a few hot flashes and weird dreams, but she says that she pretty much feels the same as before.  She also notes some difficulty sleeping.  She will be getting her reconstruction surgery next week.      REVIEW OF SYSTEMS:   14 point ROS was reviewed and is negative other than as noted above in HPI.       HOME MEDICATIONS:  Current Outpatient Medications   Medication Sig Dispense Refill     anastrozole (ARIMIDEX) 1 MG tablet Take 1 tablet (1 mg) by mouth daily 90 tablet 3     atenolol (TENORMIN) 50 MG tablet TAKE 1 TABLET(50 MG) BY MOUTH DAILY (Patient taking differently: Take 50 mg by mouth every evening Takes at 18:00) 90 tablet 3     Cholecalciferol (VITAMIN D3 PO) Take 5,000 Units by mouth every evening Takes at 18:00       l-tyrosine 500 MG TABS Take 2 tablets by mouth daily.       Misc Natural Products (GLUCOSAMINE CHOND MSM FORMULA PO) Take  1 tablet by mouth every evening (Glucosamine 500 mg Chondroitin 400 mg  mg) takes at 18:00       Multiple Vitamins-Minerals (CENTRUM SILVER) per tablet Take 1 tablet by mouth every evening        Omega-3 Fatty Acids (FISH OIL PO) Take 600 mg by mouth every evening Takes at 18:00       order for DME Equipment being ordered: post mastectomy garment.     Scheduled for right mastectomy, right SLNB, possible axillary node dissection, tissue expander on 12/14/18. 2 Units 0     Probiotic Product (PROBIOTIC DAILY PO) Take 1 capsule by mouth every evening Acidophilus with goats milk 343 mg- 10 mg.  Takes at 18:00       aspirin 81 MG tablet Take 81 mg by mouth daily            ALLERGIES:  Allergies   Allergen Reactions     Penicillins      As a child     Codeine Sulfate Diarrhea         PAST MEDICAL HISTORY:  Past Medical History:   Diagnosis Date     Breast mass      Cancer (H)     right breast     COPD (chronic obstructive pulmonary disease) (H)      Herpes labialis      Hypertension goal BP (blood pressure) < 140/90      Menopausal symptoms      Palpitations      PONV (postoperative nausea and vomiting)          PAST SURGICAL HISTORY:  Past Surgical History:   Procedure Laterality Date     COLONOSCOPY  8-21-08     DISSECT LYMPH NODE AXILLA Right 12/14/2018    Procedure: AXILLARY NODE DISECTION ( COLT );  Surgeon: Adolfo Perales MD;  Location:  OR      TOOTH EXTRACTION W/FORCEP  1973    wisdom teeth     HYSTERECTOMY  2005    for cyst and endometiosis     HYSTERECTOMY, PAP NO LONGER INDICATED       INSERT TISSUE EXPANDER BREAST Bilateral 12/14/2018    Procedure: INSERT RIGHT  TISSUE EXPANDER BREAST ( LASHAY );  Surgeon: Mio Rodríguez MD;  Location:  OR     LEG SURGERY  1975    lump removed     MASTECTOMY SIMPLE, SENTINEL NODE, COMBINED Right 12/14/2018    Procedure: RIGHT MASTECTOMY WITH RIGHT SENTINEL NODE BIOPSY (  COLT );  Surgeon: Adolfo Perales MD;  Location:  OR     TONSILLECTOMY  1964      TUBAL LIGATION           SOCIAL HISTORY:  Social History     Socioeconomic History     Marital status: Single     Spouse name: Not on file     Number of children: Not on file     Years of education: Not on file     Highest education level: Not on file   Occupational History     Not on file   Social Needs     Financial resource strain: Not on file     Food insecurity:     Worry: Not on file     Inability: Not on file     Transportation needs:     Medical: Not on file     Non-medical: Not on file   Tobacco Use     Smoking status: Former Smoker     Last attempt to quit: 10/19/1997     Years since quittin.3     Smokeless tobacco: Never Used   Substance and Sexual Activity     Alcohol use: Yes     Comment: 3 beer - 3 times a week     Drug use: No     Sexual activity: Yes     Partners: Male     Birth control/protection: Surgical     Comment: tubal ligation -  no children   Lifestyle     Physical activity:     Days per week: Not on file     Minutes per session: Not on file     Stress: Not on file   Relationships     Social connections:     Talks on phone: Not on file     Gets together: Not on file     Attends Taoism service: Not on file     Active member of club or organization: Not on file     Attends meetings of clubs or organizations: Not on file     Relationship status: Not on file     Intimate partner violence:     Fear of current or ex partner: Not on file     Emotionally abused: Not on file     Physically abused: Not on file     Forced sexual activity: Not on file   Other Topics Concern     Parent/sibling w/ CABG, MI or angioplasty before 65F 55M? No   Social History Narrative     Not on file     Winnie does not have any children.  She has had a total hysterectomy and bilateral oophorectomy and took estrogen patch for about 10 years up until her breast cancer diagnosis.  She quit smoking many years ago.  She drinks alcohol 1-2 drinks a week on weekends.  She lives with her significant other in  "[place].          FAMILY HISTORY:  Family History   Problem Relation Age of Onset     Osteoporosis Mother      Breast Cancer Mother      Prostate Cancer Father      Lipids Brother      Breast Cancer Sister    Her mother had breast cancer and has lumpectomy at age 90.  Her sister has bilateral mastectomy at age 60 for breast cancer as well.      PHYSICAL EXAM:  Vital signs:  /81 (BP Location: Right arm, Patient Position: Chair, Cuff Size: Adult Large)   Pulse 72   Temp 97.6  F (36.4  C) (Oral)   Ht 1.74 m (5' 8.5\")   Wt 103.4 kg (228 lb)   SpO2 99%   BMI 34.16 kg/m     ECO  GENERAL/CONSTITUTIONAL: No acute distress.   EYES: No scleral icterus.  LYMPH: No anterior cervical, posterior cervical, supraclavicular, axillary or inguinal adenopathy.   RESPIRATORY: Clear to auscultation bilaterally. No crackles or wheezing.   CARDIOVASCULAR: Regular rate and rhythm without murmurs, gallops, or rubs.  GASTROINTESTINAL: No tenderness. The patient has normal bowel sounds. No guarding.  No distention.  BREAST: Right-s/p mastectomy with tissue expander in place.  Left-no palpable mass, discharge, rash, or axillary lymphadenopathy.   MUSCULOSKELETAL: Warm and well-perfused, no cyanosis, clubbing, or edema.  NEUROLOGIC: Alert, oriented, answers questions appropriately.  INTEGUMENTARY: No jaundice.  GAIT: Steady, does not use assistive device      LABS:  None today.      IMAGING:  Bilateral mammogram 10/31/18:  BREAST DENSITY: Heterogeneously dense.     COMMENTS: Possible developing architectural distortion in the RIGHT  breast at approximately 8:00-10:00 position, 6 to 7 cm from the  nipple. Also, there is an enlarged RIGHT axillary lymph node. No  concerning findings in the left breast.                                                                       IMPRESSION: BI-RADS CATEGORY: 0 - Need Additional Imaging Evaluation  and/or Prior Mammograms for Comparison.    Right breast ultrasound 18:  FINDINGS:  " Targeted ultrasound was performed. At the 10:00 position of  the right breast, 7 cm from the nipple, there is an ill-defined area  of hypoechoic tissue which measures approximately 2.8 cm in maximum  dimension.     Survey of the right axilla shows a enlarged, partially  cystic-appearing mass measuring 1.9 x 1.0 x 1.1 cm.                                                                       IMPRESSION: BI-RADS CATEGORY: 4 - Suspicious Abnormality-Biopsy Should  Be Considered.  Ill-defined hypoechoic tissue at the 10:00 position of the right  breast and a partially cystic right axillary mass. Ultrasound-guided  core biopsy x2 is recommended. Results and recommendation were  discussed with the patient during her appointment.    MRI breast 11/18/18:  1. Segmental distribution of clumped non-mass enhancement encompassing  nearly the entire upper outer quadrant of the RIGHT breast measures up  to 14.4 cm in maximal dimension and comes to within 1.5 cm of the  nipple. Given the biopsy results, this is concerning for DCIS. If it  would be of clinical use, additional biopsy could be performed more  anteriorly in the breast to more definitively establish the extent of  disease prior to surgery. If this is desired, recommend additional  evaluation with ultrasound and possible biopsy, but if no target is  found, MRI guided biopsy could be performed.  2. No concerning areas of enhancement in the LEFT breast.  3. No lymphadenopathy.    DEXA 1/14/19:  Normal lumbar spine and bilateral hip bone density.        ASSESSMENT/PLAN:  Ila Pierre is a 63 year old female with:    1) Right-sided breast cancer, upper outer quadrant: On 12/14/18, she underwent right mastectomy and right axillary sentinel lymph node biopsy, with reconstruction.  Pathology showed infiltrating ductal carcinoma, grade 1, forming multiple foci up to 3 mm, DCIS intermediate nuclear grade, forming multiple foci.  There was intraductal papilloma with atypia  identified.  ER positive (98%), MT positive (98%), HER-2 negative, margins negative, 0 of 2 lymph nodes involved, stage [id number] (stage IA).  Oncotype DX score is 10.    She started anastrozole around 1/15/19 and tolerating well so far.    -continue anastrozole 1 mg daily; plan for at least 5 years treatment if can tolerate  -annual left-sided mammograms - next one will be in November 2019  -return to clinic in 3 months; if still doing well then, can move out follow-up's to every 6 months    2) Bone health: DEXA scan on 1/14/19 was normal  -next DEXA scan will be in January 2021  -she will take calcium and vitamin D, do weight bearing exercises      I spent a total of 25 minutes with the patient, with over >50% of the time in counseling and/or coordination of care.       Belkis Molina MD  Hematology/Oncology  [facility]

## 2019-03-08 NOTE — LETTER
3/8/2019         RE: Ila Pierre  [address] [facility] [address]        Dear Colleague,    Thank you for referring your patient, Ila Pierre, to the [facility]. Please see a copy of my visit note below.    [facility]    Hematology/Oncology Established Patient Note      Today's Date: 03/08/19    Reason for Follow-up: right breast cancer      HISTORY OF PRESENT ILLNESS: Ila Pierre is a 63 year old female with PMHx of HTN, COPD who presents with right-sided breast cancer.   She undergone a screening mammogram, which found a new right breast mass.  On 12/14/18, she underwent right mastectomy and right axillary sentinel lymph node biopsy by Dr. Perales, with reconstruction by Dr. Rees.  Pathology showed infiltrating ductal carcinoma, grade 1, forming multiple foci up to 3 mm, DCIS intermediate nuclear grade, forming multiple foci.  There was intraductal papilloma with atypia identified.  ER positive (98%), [place] positive (98%), HER-2 negative, margins negative, 0 of 2 lymph nodes involved, stage [id number] (stage IA). Oncotype DX score is 10.    She started anastrozole around 1/15/19.      INTERIM HISTORY: Winnie is here for follow-up today.  She says that she is doing well.  She started taking anastrozole and says that she overall feels well.  She notes a few hot flashes and weird dreams, but she says that she pretty much feels the same as before.  She also notes some difficulty sleeping.  She will be getting her reconstruction surgery next week.      REVIEW OF SYSTEMS:   14 point ROS was reviewed and is negative other than as noted above in HPI.       HOME MEDICATIONS:  Current Outpatient Medications   Medication Sig Dispense Refill     anastrozole (ARIMIDEX) 1 MG tablet Take 1 tablet (1 mg) by mouth daily 90 tablet 3     atenolol (TENORMIN) 50 MG tablet TAKE 1 TABLET(50 MG) BY MOUTH DAILY (Patient taking differently: Take 50 mg by mouth every  evening Takes at 18:00) 90 tablet 3     Cholecalciferol (VITAMIN D3 PO) Take 5,000 Units by mouth every evening Takes at 18:00       l-tyrosine 500 MG TABS Take 2 tablets by mouth daily.       Misc Natural Products (GLUCOSAMINE CHOND MSM FORMULA PO) Take 1 tablet by mouth every evening (Glucosamine 500 mg Chondroitin 400 mg  mg) takes at 18:00       Multiple Vitamins-Minerals (CENTRUM SILVER) per tablet Take 1 tablet by mouth every evening        Omega-3 Fatty Acids (FISH OIL PO) Take 600 mg by mouth every evening Takes at 18:00       order for DME Equipment being ordered: post mastectomy garment.     Scheduled for right mastectomy, right SLNB, possible axillary node dissection, tissue expander on 12/14/18. 2 Units 0     Probiotic Product (PROBIOTIC DAILY PO) Take 1 capsule by mouth every evening Acidophilus with goats milk 343 mg- 10 mg.  Takes at 18:00       aspirin 81 MG tablet Take 81 mg by mouth daily            ALLERGIES:  Allergies   Allergen Reactions     Penicillins      As a child     Codeine Sulfate Diarrhea         PAST MEDICAL HISTORY:  Past Medical History:   Diagnosis Date     Breast mass      Cancer (H)     right breast     COPD (chronic obstructive pulmonary disease) (H)      Herpes labialis      Hypertension goal BP (blood pressure) < 140/90      Menopausal symptoms      Palpitations      PONV (postoperative nausea and vomiting)          PAST SURGICAL HISTORY:  Past Surgical History:   Procedure Laterality Date     COLONOSCOPY  8-21-08     DISSECT LYMPH NODE AXILLA Right 12/14/2018    Procedure: AXILLARY NODE DISECTION ( COLT );  Surgeon: Adolfo Perales MD;  Location:  OR      TOOTH EXTRACTION W/FORCEP  1973    wisdom teeth     HYSTERECTOMY  2005    for cyst and endometiosis     HYSTERECTOMY, PAP NO LONGER INDICATED       INSERT TISSUE EXPANDER BREAST Bilateral 12/14/2018    Procedure: INSERT RIGHT  TISSUE EXPANDER BREAST ( LASHAY );  Surgeon: Mio Rodríguez MD;  Location:  [facility] OR     LEG SURGERY      lump removed     MASTECTOMY SIMPLE, SENTINEL NODE, COMBINED Right 2018    Procedure: RIGHT MASTECTOMY WITH RIGHT SENTINEL NODE BIOPSY (  COLT );  Surgeon: Adolfo Perales MD;  Location: [facility] OR     TONSILLECTOMY  1964     TUBAL LIGATION           SOCIAL HISTORY:  Social History     Socioeconomic History     Marital status: Single     Spouse name: Not on file     Number of children: Not on file     Years of education: Not on file     Highest education level: Not on file   Occupational History     Not on file   Social Needs     Financial resource strain: Not on file     Food insecurity:     Worry: Not on file     Inability: Not on file     Transportation needs:     Medical: Not on file     Non-medical: Not on file   Tobacco Use     Smoking status: Former Smoker     Last attempt to quit: 10/19/1997     Years since quittin.3     Smokeless tobacco: Never Used   Substance and Sexual Activity     Alcohol use: Yes     Comment: 3 beer - 3 times a week     Drug use: No     Sexual activity: Yes     Partners: Male     Birth control/protection: Surgical     Comment: tubal ligation -  no children   Lifestyle     Physical activity:     Days per week: Not on file     Minutes per session: Not on file     Stress: Not on file   Relationships     Social connections:     Talks on phone: Not on file     Gets together: Not on file     Attends Anglican service: Not on file     Active member of club or organization: Not on file     Attends meetings of clubs or organizations: Not on file     Relationship status: Not on file     Intimate partner violence:     Fear of current or ex partner: Not on file     Emotionally abused: Not on file     Physically abused: Not on file     Forced sexual activity: Not on file   Other Topics Concern     Parent/sibling w/ CABG, MI or angioplasty before 65F 55M? No   Social History Narrative     Not on file     Winnie does not have any children.  She has had a total  "hysterectomy and bilateral oophorectomy and took estrogen patch for about 10 years up until her breast cancer diagnosis.  She quit smoking many years ago.  She drinks alcohol 1-2 drinks a week on weekends.  She lives with her significant other in [place].          FAMILY HISTORY:  Family History   Problem Relation Age of Onset     Osteoporosis Mother      Breast Cancer Mother      Prostate Cancer Father      Lipids Brother      Breast Cancer Sister    Her mother had breast cancer and has lumpectomy at age 90.  Her sister has bilateral mastectomy at age 60 for breast cancer as well.      PHYSICAL EXAM:  Vital signs:  /81 (BP Location: Right arm, Patient Position: Chair, Cuff Size: Adult Large)   Pulse 72   Temp 97.6  F (36.4  C) (Oral)   Ht 1.74 m (5' 8.5\")   Wt 103.4 kg (228 lb)   SpO2 99%   BMI 34.16 kg/m      ECO  GENERAL/CONSTITUTIONAL: No acute distress.   EYES: No scleral icterus.  LYMPH: No anterior cervical, posterior cervical, supraclavicular, axillary or inguinal adenopathy.   RESPIRATORY: Clear to auscultation bilaterally. No crackles or wheezing.   CARDIOVASCULAR: Regular rate and rhythm without murmurs, gallops, or rubs.  GASTROINTESTINAL: No tenderness. The patient has normal bowel sounds. No guarding.  No distention.  BREAST: Right-s/p mastectomy with tissue expander in place.  Left-no palpable mass, discharge, rash, or axillary lymphadenopathy.   MUSCULOSKELETAL: Warm and well-perfused, no cyanosis, clubbing, or edema.  NEUROLOGIC: Alert, oriented, answers questions appropriately.  INTEGUMENTARY: No jaundice.  GAIT: Steady, does not use assistive device      LABS:  None today.      IMAGING:  Bilateral mammogram 10/31/18:  BREAST DENSITY: Heterogeneously dense.     COMMENTS: Possible developing architectural distortion in the RIGHT  breast at approximately 8:00-10:00 position, 6 to 7 cm from the  nipple. Also, there is an enlarged RIGHT axillary lymph node. No  concerning findings " in the left breast.                                                                       IMPRESSION: BI-RADS CATEGORY: 0 - Need Additional Imaging Evaluation  and/or Prior Mammograms for Comparison.    Right breast ultrasound 11/1/18:  FINDINGS:  Targeted ultrasound was performed. At the 10:00 position of  the right breast, 7 cm from the nipple, there is an ill-defined area  of hypoechoic tissue which measures approximately 2.8 cm in maximum  dimension.     Survey of the right axilla shows a enlarged, partially  cystic-appearing mass measuring 1.9 x 1.0 x 1.1 cm.                                                                       IMPRESSION: BI-RADS CATEGORY: 4 - Suspicious Abnormality-Biopsy Should  Be Considered.  Ill-defined hypoechoic tissue at the 10:00 position of the right  breast and a partially cystic right axillary mass. Ultrasound-guided  core biopsy x2 is recommended. Results and recommendation were  discussed with the patient during her appointment.    MRI breast 11/18/18:  1. Segmental distribution of clumped non-mass enhancement encompassing  nearly the entire upper outer quadrant of the RIGHT breast measures up  to 14.4 cm in maximal dimension and comes to within 1.5 cm of the  nipple. Given the biopsy results, this is concerning for DCIS. If it  would be of clinical use, additional biopsy could be performed more  anteriorly in the breast to more definitively establish the extent of  disease prior to surgery. If this is desired, recommend additional  evaluation with ultrasound and possible biopsy, but if no target is  found, MRI guided biopsy could be performed.  2. No concerning areas of enhancement in the LEFT breast.  3. No lymphadenopathy.    DEXA 1/14/19:  Normal lumbar spine and bilateral hip bone density.        ASSESSMENT/PLAN:  Ila Pierre is a 63 year old female with:    1) Right-sided breast cancer, upper outer quadrant: On 12/14/18, she underwent right mastectomy and right  "axillary sentinel lymph node biopsy, with reconstruction.  Pathology showed infiltrating ductal carcinoma, grade 1, forming multiple foci up to 3 mm, DCIS intermediate nuclear grade, forming multiple foci.  There was intraductal papilloma with atypia identified.  ER positive (98%), [place] positive (98%), HER-2 negative, margins negative, 0 of 2 lymph nodes involved, stage [id number] (stage IA).  Oncotype DX score is 10.    She started anastrozole around 1/15/19 and tolerating well so far.    -continue anastrozole 1 mg daily; plan for at least 5 years treatment if can tolerate  -annual left-sided mammograms - next one will be in November 2019  -return to clinic in 3 months; if still doing well then, can move out follow-up's to every 6 months    2) Bone health: DEXA scan on 1/14/19 was normal  -next DEXA scan will be in January 2021  -she will take calcium and vitamin D, do weight bearing exercises      I spent a total of 25 minutes with the patient, with over >50% of the time in counseling and/or coordination of care.       Belkis Molina MD  Hematology/Oncology  [facility]      Oncology Rooming Note    March 8, 2019 8:13 AM   Ila Pierre is a 63 year old female who presents for:    Chief Complaint   Patient presents with     Oncology Clinic Visit     Initial Vitals: /81 (BP Location: Right arm, Patient Position: Chair, Cuff Size: Adult Large)   Pulse 72   Temp 97.6  F (36.4  C) (Oral)   Ht 1.74 m (5' 8.5\")   Wt 103.4 kg (228 lb)   SpO2 99%   BMI 34.16 kg/m    Estimated body mass index is 34.16 kg/m  as calculated from the following:    Height as of this encounter: 1.74 m (5' 8.5\").    Weight as of this encounter: 103.4 kg (228 lb). Body surface area is 2.24 meters squared.  Severe Pain (6) Comment: Data Unavailable   No LMP recorded. Patient has had a hysterectomy.  Allergies reviewed: Yes  Medications reviewed: Yes    Medications: Medication refills not needed today.  Pharmacy " name entered into [facility]: [id number] [place] [facility] [place], [place] - [address]    Clinical concerns:  *** was notified.      Nichole Hernández, ROMEO              Again, thank you for allowing me to participate in the care of your patient.        Sincerely,        Belkis Molina MD

## 2019-03-15 ENCOUNTER — HOSPITAL PATHOLOGY (OUTPATIENT)
Dept: OTHER | Facility: CLINIC | Age: 64
End: 2019-03-15

## 2019-03-19 LAB — COPATH REPORT: NORMAL

## 2019-06-11 ENCOUNTER — ONCOLOGY VISIT (OUTPATIENT)
Dept: ONCOLOGY | Facility: CLINIC | Age: 64
End: 2019-06-11
Attending: INTERNAL MEDICINE
Payer: COMMERCIAL

## 2019-06-11 VITALS
DIASTOLIC BLOOD PRESSURE: 81 MMHG | HEART RATE: 66 BPM | HEIGHT: 69 IN | WEIGHT: 229.8 LBS | BODY MASS INDEX: 34.04 KG/M2 | OXYGEN SATURATION: 99 % | TEMPERATURE: 97.8 F | SYSTOLIC BLOOD PRESSURE: 136 MMHG | RESPIRATION RATE: 18 BRPM

## 2019-06-11 DIAGNOSIS — C50.911 MALIGNANT NEOPLASM OF RIGHT BREAST IN FEMALE, ESTROGEN RECEPTOR POSITIVE, UNSPECIFIED SITE OF BREAST (H): Primary | ICD-10-CM

## 2019-06-11 DIAGNOSIS — R92.8 ABNORMAL MRI, BREAST: Primary | ICD-10-CM

## 2019-06-11 DIAGNOSIS — Z17.0 MALIGNANT NEOPLASM OF RIGHT BREAST IN FEMALE, ESTROGEN RECEPTOR POSITIVE, UNSPECIFIED SITE OF BREAST (H): Primary | ICD-10-CM

## 2019-06-11 PROCEDURE — G0463 HOSPITAL OUTPT CLINIC VISIT: HCPCS

## 2019-06-11 PROCEDURE — 99214 OFFICE O/P EST MOD 30 MIN: CPT | Performed by: INTERNAL MEDICINE

## 2019-06-11 RX ORDER — EXEMESTANE 25 MG/1
25 TABLET ORAL DAILY
Qty: 90 TABLET | Refills: 3 | Status: SHIPPED | OUTPATIENT
Start: 2019-06-11 | End: 2019-07-10 | Stop reason: SINTOL

## 2019-06-11 ASSESSMENT — PAIN SCALES - GENERAL: PAINLEVEL: SEVERE PAIN (6)

## 2019-06-11 ASSESSMENT — MIFFLIN-ST. JEOR: SCORE: 1658.81

## 2019-06-11 NOTE — LETTER
"    6/11/2019         RE: Ila Pierre  [address]  [facility] [address]        Dear Colleague,    Thank you for referring your patient, Ila Pierre, to the [facility]. Please see a copy of my visit note below.    Oncology Rooming Note    June 11, 2019 4:04 PM   Ila Pierre is a 63 year old female who presents for:    Chief Complaint   Patient presents with     Oncology Clinic Visit     Breast cancer (H)     Initial Vitals: /81 (BP Location: Right arm, Patient Position: Sitting, Cuff Size: Adult Large)   Pulse 66   Temp 97.8  F (36.6  C) (Oral)   Resp 18   Ht 1.74 m (5' 8.5\")   Wt 104.2 kg (229 lb 12.8 oz)   SpO2 99%   BMI 34.43 kg/m    Estimated body mass index is 34.43 kg/m  as calculated from the following:    Height as of this encounter: 1.74 m (5' 8.5\").    Weight as of this encounter: 104.2 kg (229 lb 12.8 oz). Body surface area is 2.24 meters squared.  Severe Pain (6) Comment: Data Unavailable   No LMP recorded. Patient has had a hysterectomy.  Allergies reviewed: Yes  Medications reviewed: Yes    Medications: MEDICATION REFILLS NEEDED TODAY. Provider was notified. Refill ANASTROZOLE  Pharmacy name entered into [facility]: [id number] [place] [id number]    Clinical concerns: no          Christelle Grimes MA                [facility]    Hematology/Oncology Established Patient Note      Today's Date: 06/11/19    Reason for Follow-up: right breast cancer      HISTORY OF PRESENT ILLNESS: Ila Pierre is a 63 year old female with PMHx of HTN, COPD who presents with right-sided breast cancer.   She undergone a screening mammogram, which found a new right breast mass.  On 12/14/18, she underwent right mastectomy and right axillary sentinel lymph node biopsy by Dr. Perales, with reconstruction by Dr. Rees.  Pathology showed infiltrating ductal carcinoma, grade 1, forming multiple foci up to 3 mm, DCIS " intermediate nuclear grade, forming multiple foci.  There was intraductal papilloma with atypia identified.  ER positive (98%), [place] positive (98%), HER-2 negative, margins negative, 0 of 2 lymph nodes involved, stage [id number] (stage IA). Oncotype DX score is 10.    She started anastrozole around 1/15/19.      INTERIM HISTORY: Winnie is here for follow-up today.  She underwent breast reconstruction surgery and breast reduction on the left side in March 2019.  She says that it was tougher than the mastectomy surgery, and she is still having some soreness.  She sees her plastic surgeon in July.  She says that she is feeling more hot flashes and bone pains ever since starting the anastrozole.  She gets pain when she raises her arms, which occurred even before her reconstruction surgery and since starting anastrozole.        REVIEW OF SYSTEMS:   14 point ROS was reviewed and is negative other than as noted above in HPI.       HOME MEDICATIONS:  Current Outpatient Medications   Medication Sig Dispense Refill     anastrozole (ARIMIDEX) 1 MG tablet Take 1 tablet (1 mg) by mouth daily 90 tablet 3     atenolol (TENORMIN) 50 MG tablet TAKE 1 TABLET(50 MG) BY MOUTH DAILY (Patient taking differently: Take 50 mg by mouth every evening Takes at 18:00) 90 tablet 3     Cholecalciferol (VITAMIN D3 PO) Take 5,000 Units by mouth every evening Takes at 18:00       l-tyrosine 500 MG TABS Take 2 tablets by mouth daily.       Misc Natural Products (GLUCOSAMINE CHOND MSM FORMULA PO) Take 1 tablet by mouth every evening (Glucosamine 500 mg Chondroitin 400 mg  mg) takes at 18:00       Multiple Vitamins-Minerals (CENTRUM SILVER) per tablet Take 1 tablet by mouth every evening        Omega-3 Fatty Acids (FISH OIL PO) Take 600 mg by mouth every evening Takes at 18:00       order for DME Equipment being ordered: post mastectomy garment.     Scheduled for right mastectomy, right SLNB, possible axillary node dissection, tissue expander on  12/14/18. 2 Units 0     Probiotic Product (PROBIOTIC DAILY PO) Take 1 capsule by mouth every evening Acidophilus with goats milk 343 mg- 10 mg.  Takes at 18:00       aspirin 81 MG tablet Take 81 mg by mouth daily            ALLERGIES:  Allergies   Allergen Reactions     Penicillins      As a child     Codeine Sulfate Diarrhea         PAST MEDICAL HISTORY:  Past Medical History:   Diagnosis Date     Breast mass      Cancer (H)     right breast     COPD (chronic obstructive pulmonary disease) (H)      Herpes labialis      Hypertension goal BP (blood pressure) < 140/90      Menopausal symptoms      Palpitations      PONV (postoperative nausea and vomiting)          PAST SURGICAL HISTORY:  Past Surgical History:   Procedure Laterality Date     COLONOSCOPY  8-21-08     DISSECT LYMPH NODE AXILLA Right 12/14/2018    Procedure: AXILLARY NODE DISECTION ( COLT );  Surgeon: Adolfo Perales MD;  Location:  OR      TOOTH EXTRACTION W/FORCEP  1973    wisdom teeth     HYSTERECTOMY  2005    for cyst and endometiosis     HYSTERECTOMY, PAP NO LONGER INDICATED       INSERT TISSUE EXPANDER BREAST Bilateral 12/14/2018    Procedure: INSERT RIGHT  TISSUE EXPANDER BREAST ( LASHAY );  Surgeon: Mio Rodríguez MD;  Location:  OR     LEG SURGERY  1975    lump removed     MASTECTOMY SIMPLE, SENTINEL NODE, COMBINED Right 12/14/2018    Procedure: RIGHT MASTECTOMY WITH RIGHT SENTINEL NODE BIOPSY (  COLT );  Surgeon: Adolfo Perales MD;  Location:  OR     TONSILLECTOMY  1964     TUBAL LIGATION  1993         SOCIAL HISTORY:  Social History     Socioeconomic History     Marital status: Single     Spouse name: Not on file     Number of children: Not on file     Years of education: Not on file     Highest education level: Not on file   Occupational History     Not on file   Social Needs     Financial resource strain: Not on file     Food insecurity:     Worry: Not on file     Inability: Not on file     Transportation needs:      Medical: Not on file     Non-medical: Not on file   Tobacco Use     Smoking status: Former Smoker     Last attempt to quit: 10/19/1997     Years since quittin.6     Smokeless tobacco: Never Used   Substance and Sexual Activity     Alcohol use: Yes     Comment: 3 beer - 3 times a week     Drug use: No     Sexual activity: Yes     Partners: Male     Birth control/protection: Surgical     Comment: tubal ligation -  no children   Lifestyle     Physical activity:     Days per week: Not on file     Minutes per session: Not on file     Stress: Not on file   Relationships     Social connections:     Talks on phone: Not on file     Gets together: Not on file     Attends Voodoo service: Not on file     Active member of club or organization: Not on file     Attends meetings of clubs or organizations: Not on file     Relationship status: Not on file     Intimate partner violence:     Fear of current or ex partner: Not on file     Emotionally abused: Not on file     Physically abused: Not on file     Forced sexual activity: Not on file   Other Topics Concern     Parent/sibling w/ CABG, MI or angioplasty before 65F 55M? No   Social History Narrative     Not on file     Winnie does not have any children.  She has had a total hysterectomy and bilateral oophorectomy and took estrogen patch for about 10 years up until her breast cancer diagnosis.  She quit smoking many years ago.  She drinks alcohol 1-2 drinks a week on weekends.  She lives with her significant other in [place].          FAMILY HISTORY:  Family History   Problem Relation Age of Onset     Osteoporosis Mother      Breast Cancer Mother      Prostate Cancer Father      Lipids Brother      Breast Cancer Sister    Her mother had breast cancer and has lumpectomy at age 90.  Her sister has bilateral mastectomy at age 60 for breast cancer as well.      PHYSICAL EXAM:  Vital signs:  /81 (BP Location: Right arm, Patient Position: Sitting, Cuff Size: Adult  "Large)   Pulse 66   Temp 97.8  F (36.6  C) (Oral)   Resp 18   Ht 1.74 m (5' 8.5\")   Wt 104.2 kg (229 lb 12.8 oz)   SpO2 99%   BMI 34.43 kg/m      ECO  GENERAL/CONSTITUTIONAL: No acute distress.   EYES: No scleral icterus.  LNEUROLOGIC: Alert, oriented, answers questions appropriately.  INTEGUMENTARY: No jaundice.  GAIT: Steady, does not use assistive device      LABS:  None today.      IMAGING:  Bilateral mammogram 10/31/18:  BREAST DENSITY: Heterogeneously dense.     COMMENTS: Possible developing architectural distortion in the RIGHT  breast at approximately 8:00-10:00 position, 6 to 7 cm from the  nipple. Also, there is an enlarged RIGHT axillary lymph node. No  concerning findings in the left breast.                                                                       IMPRESSION: BI-RADS CATEGORY: 0 - Need Additional Imaging Evaluation  and/or Prior Mammograms for Comparison.    Right breast ultrasound 18:  FINDINGS:  Targeted ultrasound was performed. At the 10:00 position of  the right breast, 7 cm from the nipple, there is an ill-defined area  of hypoechoic tissue which measures approximately 2.8 cm in maximum  dimension.     Survey of the right axilla shows a enlarged, partially  cystic-appearing mass measuring 1.9 x 1.0 x 1.1 cm.                                                                       IMPRESSION: BI-RADS CATEGORY: 4 - Suspicious Abnormality-Biopsy Should  Be Considered.  Ill-defined hypoechoic tissue at the 10:00 position of the right  breast and a partially cystic right axillary mass. Ultrasound-guided  core biopsy x2 is recommended. Results and recommendation were  discussed with the patient during her appointment.    MRI breast 18:  1. Segmental distribution of clumped non-mass enhancement encompassing  nearly the entire upper outer quadrant of the RIGHT breast measures up  to 14.4 cm in maximal dimension and comes to within 1.5 cm of the  nipple. Given the biopsy " results, this is concerning for DCIS. If it  would be of clinical use, additional biopsy could be performed more  anteriorly in the breast to more definitively establish the extent of  disease prior to surgery. If this is desired, recommend additional  evaluation with ultrasound and possible biopsy, but if no target is  found, MRI guided biopsy could be performed.  2. No concerning areas of enhancement in the LEFT breast.  3. No lymphadenopathy.    DEXA 1/14/19:  Normal lumbar spine and bilateral hip bone density.        ASSESSMENT/PLAN:  Ila Pierre is a 63 year old female with:    1) Right-sided breast cancer, upper outer quadrant: On 12/14/18, she underwent right mastectomy and right axillary sentinel lymph node biopsy, with reconstruction.  Pathology showed infiltrating ductal carcinoma, grade 1, forming multiple foci up to 3 mm, DCIS intermediate nuclear grade, forming multiple foci.  There was intraductal papilloma with atypia identified.  ER positive (98%), [place] positive (98%), HER-2 negative, margins negative, 0 of 2 lymph nodes involved, stage [id number] (stage IA).  Oncotype DX score is 10.    She started anastrozole around 1/15/19.  She is getting more hot flashes and myalgias/arthralgias.  We discussed options including staying on anastrozole and trying Effexor or switching to exemestane.  She is going to exercise more and try yoga.  She decided to try exemestane and see how she feels.     -plan for at least 5 years or hormonal treatment if can tolerate  -will switch to exemestane - prescription written today. She received some information and counseling today.  -annual left-sided mammograms - next one will be in November 2019 - will order at the next visit  -RTC in 3 months to see how she is tolerating exemestane    2) Bone health: DEXA scan on 1/14/19 was normal  -next DEXA scan will be in January 2021  -she will take calcium and vitamin D, do weight bearing exercises    3) Vaginal dryness:   -she  will try lubricants      I spent a total of 25 minutes with the patient, with over >50% of the time in counseling and/or coordination of care.       Belkis Molina MD  Hematology/Oncology  [facility]      Again, thank you for allowing me to participate in the care of your patient.        Sincerely,        Belkis Molina MD

## 2019-06-11 NOTE — PROGRESS NOTES
[facility]    Hematology/Oncology Established Patient Note      Today's Date: 06/11/19    Reason for Follow-up: right breast cancer      HISTORY OF PRESENT ILLNESS: Ila Pierre is a 63 year old female with PMHx of HTN, COPD who presents with right-sided breast cancer.   She undergone a screening mammogram, which found a new right breast mass.  On 12/14/18, she underwent right mastectomy and right axillary sentinel lymph node biopsy by Dr. Perales, with reconstruction by Dr. Rees.  Pathology showed infiltrating ductal carcinoma, grade 1, forming multiple foci up to 3 mm, DCIS intermediate nuclear grade, forming multiple foci.  There was intraductal papilloma with atypia identified.  ER positive (98%), OH positive (98%), HER-2 negative, margins negative, 0 of 2 lymph nodes involved, stage [id number] (stage IA). Oncotype DX score is 10.    She started anastrozole around 1/15/19.      INTERIM HISTORY: Winnie is here for follow-up today.  She underwent breast reconstruction surgery and breast reduction on the left side in March 2019.  She says that it was tougher than the mastectomy surgery, and she is still having some soreness.  She sees her plastic surgeon in July.  She says that she is feeling more hot flashes and bone pains ever since starting the anastrozole.  She gets pain when she raises her arms, which occurred even before her reconstruction surgery and since starting anastrozole.        REVIEW OF SYSTEMS:   14 point ROS was reviewed and is negative other than as noted above in HPI.       HOME MEDICATIONS:  Current Outpatient Medications   Medication Sig Dispense Refill     anastrozole (ARIMIDEX) 1 MG tablet Take 1 tablet (1 mg) by mouth daily 90 tablet 3     atenolol (TENORMIN) 50 MG tablet TAKE 1 TABLET(50 MG) BY MOUTH DAILY (Patient taking differently: Take 50 mg by mouth every evening Takes at 18:00) 90 tablet 3     Cholecalciferol (VITAMIN D3 PO) Take 5,000 Units by mouth every  evening Takes at 18:00       l-tyrosine 500 MG TABS Take 2 tablets by mouth daily.       Misc Natural Products (GLUCOSAMINE CHOND MSM FORMULA PO) Take 1 tablet by mouth every evening (Glucosamine 500 mg Chondroitin 400 mg  mg) takes at 18:00       Multiple Vitamins-Minerals (CENTRUM SILVER) per tablet Take 1 tablet by mouth every evening        Omega-3 Fatty Acids (FISH OIL PO) Take 600 mg by mouth every evening Takes at 18:00       order for DME Equipment being ordered: post mastectomy garment.     Scheduled for right mastectomy, right SLNB, possible axillary node dissection, tissue expander on 12/14/18. 2 Units 0     Probiotic Product (PROBIOTIC DAILY PO) Take 1 capsule by mouth every evening Acidophilus with goats milk 343 mg- 10 mg.  Takes at 18:00       aspirin 81 MG tablet Take 81 mg by mouth daily            ALLERGIES:  Allergies   Allergen Reactions     Penicillins      As a child     Codeine Sulfate Diarrhea         PAST MEDICAL HISTORY:  Past Medical History:   Diagnosis Date     Breast mass      Cancer (H)     right breast     COPD (chronic obstructive pulmonary disease) (H)      Herpes labialis      Hypertension goal BP (blood pressure) < 140/90      Menopausal symptoms      Palpitations      PONV (postoperative nausea and vomiting)          PAST SURGICAL HISTORY:  Past Surgical History:   Procedure Laterality Date     COLONOSCOPY  8-21-08     DISSECT LYMPH NODE AXILLA Right 12/14/2018    Procedure: AXILLARY NODE DISECTION ( COLT );  Surgeon: Adolfo Perales MD;  Location:  OR      TOOTH EXTRACTION W/FORCEP  1973    wisdom teeth     HYSTERECTOMY  2005    for cyst and endometiosis     HYSTERECTOMY, PAP NO LONGER INDICATED       INSERT TISSUE EXPANDER BREAST Bilateral 12/14/2018    Procedure: INSERT RIGHT  TISSUE EXPANDER BREAST ( ARIELECK );  Surgeon: Mio Rodríguez MD;  Location:  OR     LEG SURGERY  1975    lump removed     MASTECTOMY SIMPLE, SENTINEL NODE, COMBINED Right  2018    Procedure: RIGHT MASTECTOMY WITH RIGHT SENTINEL NODE BIOPSY (  COLT );  Surgeon: Adolfo Perales MD;  Location: [facility] OR     TONSILLECTOMY  1964     TUBAL LIGATION           SOCIAL HISTORY:  Social History     Socioeconomic History     Marital status: Single     Spouse name: Not on file     Number of children: Not on file     Years of education: Not on file     Highest education level: Not on file   Occupational History     Not on file   Social Needs     Financial resource strain: Not on file     Food insecurity:     Worry: Not on file     Inability: Not on file     Transportation needs:     Medical: Not on file     Non-medical: Not on file   Tobacco Use     Smoking status: Former Smoker     Last attempt to quit: 10/19/1997     Years since quittin.6     Smokeless tobacco: Never Used   Substance and Sexual Activity     Alcohol use: Yes     Comment: 3 beer - 3 times a week     Drug use: No     Sexual activity: Yes     Partners: Male     Birth control/protection: Surgical     Comment: tubal ligation -  no children   Lifestyle     Physical activity:     Days per week: Not on file     Minutes per session: Not on file     Stress: Not on file   Relationships     Social connections:     Talks on phone: Not on file     Gets together: Not on file     Attends Gnosticism service: Not on file     Active member of club or organization: Not on file     Attends meetings of clubs or organizations: Not on file     Relationship status: Not on file     Intimate partner violence:     Fear of current or ex partner: Not on file     Emotionally abused: Not on file     Physically abused: Not on file     Forced sexual activity: Not on file   Other Topics Concern     Parent/sibling w/ CABG, MI or angioplasty before 65F 55M? No   Social History Narrative     Not on file     Winnie does not have any children.  She has had a total hysterectomy and bilateral oophorectomy and took estrogen patch for about 10 years up until her  "breast cancer diagnosis.  She quit smoking many years ago.  She drinks alcohol 1-2 drinks a week on weekends.  She lives with her significant other in [place].          FAMILY HISTORY:  Family History   Problem Relation Age of Onset     Osteoporosis Mother      Breast Cancer Mother      Prostate Cancer Father      Lipids Brother      Breast Cancer Sister    Her mother had breast cancer and has lumpectomy at age 90.  Her sister has bilateral mastectomy at age 60 for breast cancer as well.      PHYSICAL EXAM:  Vital signs:  /81 (BP Location: Right arm, Patient Position: Sitting, Cuff Size: Adult Large)   Pulse 66   Temp 97.8  F (36.6  C) (Oral)   Resp 18   Ht 1.74 m (5' 8.5\")   Wt 104.2 kg (229 lb 12.8 oz)   SpO2 99%   BMI 34.43 kg/m     ECO  GENERAL/CONSTITUTIONAL: No acute distress.   EYES: No scleral icterus.  LNEUROLOGIC: Alert, oriented, answers questions appropriately.  INTEGUMENTARY: No jaundice.  GAIT: Steady, does not use assistive device      LABS:  None today.      IMAGING:  Bilateral mammogram 10/31/18:  BREAST DENSITY: Heterogeneously dense.     COMMENTS: Possible developing architectural distortion in the RIGHT  breast at approximately 8:00-10:00 position, 6 to 7 cm from the  nipple. Also, there is an enlarged RIGHT axillary lymph node. No  concerning findings in the left breast.                                                                       IMPRESSION: BI-RADS CATEGORY: 0 - Need Additional Imaging Evaluation  and/or Prior Mammograms for Comparison.    Right breast ultrasound 18:  FINDINGS:  Targeted ultrasound was performed. At the 10:00 position of  the right breast, 7 cm from the nipple, there is an ill-defined area  of hypoechoic tissue which measures approximately 2.8 cm in maximum  dimension.     Survey of the right axilla shows a enlarged, partially  cystic-appearing mass measuring 1.9 x 1.0 x 1.1 cm.                                                                  "      IMPRESSION: BI-RADS CATEGORY: 4 - Suspicious Abnormality-Biopsy Should  Be Considered.  Ill-defined hypoechoic tissue at the 10:00 position of the right  breast and a partially cystic right axillary mass. Ultrasound-guided  core biopsy x2 is recommended. Results and recommendation were  discussed with the patient during her appointment.    MRI breast 11/18/18:  1. Segmental distribution of clumped non-mass enhancement encompassing  nearly the entire upper outer quadrant of the RIGHT breast measures up  to 14.4 cm in maximal dimension and comes to within 1.5 cm of the  nipple. Given the biopsy results, this is concerning for DCIS. If it  would be of clinical use, additional biopsy could be performed more  anteriorly in the breast to more definitively establish the extent of  disease prior to surgery. If this is desired, recommend additional  evaluation with ultrasound and possible biopsy, but if no target is  found, MRI guided biopsy could be performed.  2. No concerning areas of enhancement in the LEFT breast.  3. No lymphadenopathy.    DEXA 1/14/19:  Normal lumbar spine and bilateral hip bone density.        ASSESSMENT/PLAN:  Ila Pierre is a 63 year old female with:    1) Right-sided breast cancer, upper outer quadrant: On 12/14/18, she underwent right mastectomy and right axillary sentinel lymph node biopsy, with reconstruction.  Pathology showed infiltrating ductal carcinoma, grade 1, forming multiple foci up to 3 mm, DCIS intermediate nuclear grade, forming multiple foci.  There was intraductal papilloma with atypia identified.  ER positive (98%), UT positive (98%), HER-2 negative, margins negative, 0 of 2 lymph nodes involved, stage [id number] (stage IA).  Oncotype DX score is 10.    She started anastrozole around 1/15/19.  She is getting more hot flashes and myalgias/arthralgias.  We discussed options including staying on anastrozole and trying Effexor or switching to exemestane.  She is going to  exercise more and try yoga.  She decided to try exemestane and see how she feels.     -plan for at least 5 years or hormonal treatment if can tolerate  -will switch to exemestane - prescription written today. She received some information and counseling today.  -annual left-sided mammograms - next one will be in November 2019 - will order at the next visit  -RTC in 3 months to see how she is tolerating exemestane    Of note, patient may move to [place] around November/December 2019.  She will let us know her plans at the next visit.    2) Bone health: DEXA scan on 1/14/19 was normal  -next DEXA scan will be in January 2021  -she will take calcium and vitamin D, do weight bearing exercises    3) Vaginal dryness:   -she will try lubricants      I spent a total of 25 minutes with the patient, with over >50% of the time in counseling and/or coordination of care.       Belkis Molina MD  Hematology/Oncology  [facility]

## 2019-06-11 NOTE — PROGRESS NOTES
"Oncology Rooming Note    June 11, 2019 4:04 PM   Ila Pierre is a 63 year old female who presents for:    Chief Complaint   Patient presents with     Oncology Clinic Visit     Breast cancer (H)     Initial Vitals: /81 (BP Location: Right arm, Patient Position: Sitting, Cuff Size: Adult Large)   Pulse 66   Temp 97.8  F (36.6  C) (Oral)   Resp 18   Ht 1.74 m (5' 8.5\")   Wt 104.2 kg (229 lb 12.8 oz)   SpO2 99%   BMI 34.43 kg/m   Estimated body mass index is 34.43 kg/m  as calculated from the following:    Height as of this encounter: 1.74 m (5' 8.5\").    Weight as of this encounter: 104.2 kg (229 lb 12.8 oz). Body surface area is 2.24 meters squared.  Severe Pain (6) Comment: Data Unavailable   No LMP recorded. Patient has had a hysterectomy.  Allergies reviewed: Yes  Medications reviewed: Yes    Medications: MEDICATION REFILLS NEEDED TODAY. Provider was notified. Refill ANASTROZOLE  Pharmacy name entered into [facility]: [id number] [place] [id number]    Clinical concerns: no          Christelle Grimes MA              "

## 2019-07-09 ENCOUNTER — TELEPHONE (OUTPATIENT)
Dept: ONCOLOGY | Facility: CLINIC | Age: 64
End: 2019-07-09

## 2019-07-09 DIAGNOSIS — Z17.0 MALIGNANT NEOPLASM OF RIGHT BREAST IN FEMALE, ESTROGEN RECEPTOR POSITIVE, UNSPECIFIED SITE OF BREAST (H): Primary | ICD-10-CM

## 2019-07-09 DIAGNOSIS — C50.911 MALIGNANT NEOPLASM OF RIGHT BREAST IN FEMALE, ESTROGEN RECEPTOR POSITIVE, UNSPECIFIED SITE OF BREAST (H): Primary | ICD-10-CM

## 2019-07-09 NOTE — TELEPHONE ENCOUNTER
Please see if she would like to go back on Arimidex, or try another medication, such a letrozole or tamoxifen.  If she would like, she can make appointment with me to discuss the options earlier than her September appointment.

## 2019-07-09 NOTE — TELEPHONE ENCOUNTER
Patient called stating she was started on aromasin because she was not tolerating the arimidex, however she feels the side effects are worse.  She is experiencing more muscle and joint pain, she states she is tired, but can't sleep and the hot flashes are worse.  States she has tried this for about 1 month.  Will route this to Dr. Molina.

## 2019-07-10 RX ORDER — LETROZOLE 2.5 MG/1
2.5 TABLET, FILM COATED ORAL DAILY
Qty: 60 TABLET | Refills: 0 | Status: SHIPPED | OUTPATIENT
Start: 2019-07-10 | End: 2019-08-02

## 2019-07-10 NOTE — TELEPHONE ENCOUNTER
Called [facility] with Dr. Molina's recommendations and she stated that she would like to try Letrozole. She will stop the Exestame, take a week off, then start the Femara. She will keep her scheduled appointment with Dr. Molina in September. Writer will follow up with her in 1 month to see how she is tolerating the Femara. Krystyna Maloney RN,BSN,OCN

## 2019-08-02 ENCOUNTER — ONCOLOGY VISIT (OUTPATIENT)
Dept: ONCOLOGY | Facility: CLINIC | Age: 64
End: 2019-08-02
Attending: INTERNAL MEDICINE
Payer: COMMERCIAL

## 2019-08-02 VITALS
BODY MASS INDEX: 33.26 KG/M2 | TEMPERATURE: 97.8 F | OXYGEN SATURATION: 100 % | SYSTOLIC BLOOD PRESSURE: 136 MMHG | WEIGHT: 222 LBS | HEART RATE: 68 BPM | RESPIRATION RATE: 16 BRPM | DIASTOLIC BLOOD PRESSURE: 82 MMHG

## 2019-08-02 DIAGNOSIS — C50.911 MALIGNANT NEOPLASM OF RIGHT BREAST IN FEMALE, ESTROGEN RECEPTOR POSITIVE, UNSPECIFIED SITE OF BREAST (H): Primary | ICD-10-CM

## 2019-08-02 DIAGNOSIS — Z17.0 MALIGNANT NEOPLASM OF RIGHT BREAST IN FEMALE, ESTROGEN RECEPTOR POSITIVE, UNSPECIFIED SITE OF BREAST (H): Primary | ICD-10-CM

## 2019-08-02 PROCEDURE — 99214 OFFICE O/P EST MOD 30 MIN: CPT | Performed by: INTERNAL MEDICINE

## 2019-08-02 PROCEDURE — G0463 HOSPITAL OUTPT CLINIC VISIT: HCPCS

## 2019-08-02 ASSESSMENT — PAIN SCALES - GENERAL: PAINLEVEL: MODERATE PAIN (4)

## 2019-08-02 NOTE — PROGRESS NOTES
"Oncology Rooming Note    August 2, 2019 1:43 PM   Ila Pierre is a 63 year old female who presents for:    Chief Complaint   Patient presents with     Oncology Clinic Visit     Breast cancer- Discuss ai side effects     Initial Vitals: /82   Pulse 68   Temp 97.8  F (36.6  C)   Resp 16   Wt 100.7 kg (222 lb)   SpO2 100%   BMI 33.26 kg/m   Estimated body mass index is 33.26 kg/m  as calculated from the following:    Height as of 6/11/19: 1.74 m (5' 8.5\").    Weight as of this encounter: 100.7 kg (222 lb). Body surface area is 2.21 meters squared.  Moderate Pain (4) Comment: Data Unavailable   No LMP recorded. Patient has had a hysterectomy.  Allergies reviewed: Yes  Medications reviewed: Yes    Medications: Medication refills not needed today.  Pharmacy name entered into [employer]: [id number] [place] [id number]    Clinical concerns: AI side effects. C/O joint pain from Femara Dr. Molina was notified.      Gabriela Grewal RN              "

## 2019-08-02 NOTE — LETTER
"    8/2/2019         RE: Ila Pierre  [address]  [facility] [address]        Dear Colleague,    Thank you for referring your patient, Ila Pierre, to the [facility]. Please see a copy of my visit note below.    Oncology Rooming Note    August 2, 2019 1:43 PM   Ila Pierre is a 63 year old female who presents for:    Chief Complaint   Patient presents with     Oncology Clinic Visit     Breast cancer- Discuss ai side effects     Initial Vitals: /82   Pulse 68   Temp 97.8  F (36.6  C)   Resp 16   Wt 100.7 kg (222 lb)   SpO2 100%   BMI 33.26 kg/m    Estimated body mass index is 33.26 kg/m  as calculated from the following:    Height as of 6/11/19: 1.74 m (5' 8.5\").    Weight as of this encounter: 100.7 kg (222 lb). Body surface area is 2.21 meters squared.  Moderate Pain (4) Comment: Data Unavailable   No LMP recorded. Patient has had a hysterectomy.  Allergies reviewed: Yes  Medications reviewed: Yes    Medications: Medication refills not needed today.  Pharmacy name entered into [employer]: [id number] [place], [place] - [address]    Clinical concerns: AI side effects. C/O joint pain from [employer] Dr. Molina was notified.      Gabriela Grewal RN                [facility]    Hematology/Oncology Established Patient Note      Today's Date: 08/02/19    Reason for Follow-up: right breast cancer      HISTORY OF PRESENT ILLNESS: Ila Pierre is a 63 year old female with PMHx of HTN, COPD who presents with right-sided breast cancer.   She undergone a screening mammogram, which found a new right breast mass.  On 12/14/18, she underwent right mastectomy and right axillary sentinel lymph node biopsy by Dr. Perales, with reconstruction by Dr. Rees.  Pathology showed infiltrating ductal carcinoma, grade 1, forming multiple foci up to 3 mm, DCIS intermediate nuclear grade, forming multiple foci.  There was intraductal papilloma with " atypia identified.  ER positive (98%), NJ positive (98%), HER-2 negative, margins negative, 0 of 2 lymph nodes involved, stage [id number] (stage IA). Oncotype DX score is 10.    She started anastrozole around 1/15/19.  Due hot flashes and bone pain, she switched to exemestane, but she felt worse on it.  She then tried letrozole, but, again, felt worse on that as well.      INTERIM HISTORY: Winnie is here for follow-up today.  She has been on letrozole, but feels that the side effects were worse than anastrozole.  She is having more hot flashes and bone pains.  She feels that her quality of life is worse.      REVIEW OF SYSTEMS:   14 point ROS was reviewed and is negative other than as noted above in HPI.       HOME MEDICATIONS:  Current Outpatient Medications   Medication Sig Dispense Refill     atenolol (TENORMIN) 50 MG tablet TAKE 1 TABLET(50 MG) BY MOUTH DAILY (Patient taking differently: Take 50 mg by mouth every evening Takes at 18:00) 90 tablet 3     Calcium Carb-Cholecalciferol (CALCIUM 1000 + D) 1000-800 MG-UNIT TABS Take by mouth daily       Cholecalciferol (VITAMIN D3 PO) Take 5,000 Units by mouth every evening Takes at 18:00       l-tyrosine 500 MG TABS Take 2 tablets by mouth daily.       letrozole (FEMARA) 2.5 MG tablet Take 1 tablet (2.5 mg) by mouth daily 60 tablet 0     Misc Natural Products (GLUCOSAMINE CHOND MSM FORMULA PO) Take 1 tablet by mouth every evening (Glucosamine 500 mg Chondroitin 400 mg  mg) takes at 18:00       Multiple Vitamins-Minerals (CENTRUM SILVER) per tablet Take 1 tablet by mouth every evening        Omega-3 Fatty Acids (FISH OIL PO) Take 600 mg by mouth every evening Takes at 18:00       order for DME Equipment being ordered: post mastectomy garment.     Scheduled for right mastectomy, right SLNB, possible axillary node dissection, tissue expander on 12/14/18. 2 Units 0     Probiotic Product (PROBIOTIC DAILY PO) Take 1 capsule by mouth every evening Acidophilus with goats  milk 343 mg- 10 mg.  Takes at 18:00       aspirin 81 MG tablet Take 81 mg by mouth daily            ALLERGIES:  Allergies   Allergen Reactions     Penicillins      As a child     Codeine Sulfate Diarrhea         PAST MEDICAL HISTORY:  Past Medical History:   Diagnosis Date     Breast mass      Cancer (H)     right breast     COPD (chronic obstructive pulmonary disease) (H)      Herpes labialis      Hypertension goal BP (blood pressure) < 140/90      Menopausal symptoms      Palpitations      PONV (postoperative nausea and vomiting)          PAST SURGICAL HISTORY:  Past Surgical History:   Procedure Laterality Date     COLONOSCOPY  8-21-08     DISSECT LYMPH NODE AXILLA Right 12/14/2018    Procedure: AXILLARY NODE DISECTION ( COLT );  Surgeon: Adolfo Perales MD;  Location:  OR     HC TOOTH EXTRACTION W/FORCEP  1973    wisdom teeth     HYSTERECTOMY  2005    for cyst and endometiosis     HYSTERECTOMY, PAP NO LONGER INDICATED       INSERT TISSUE EXPANDER BREAST Bilateral 12/14/2018    Procedure: INSERT RIGHT  TISSUE EXPANDER BREAST ( LASHAY );  Surgeon: Mio Rodríguez MD;  Location:  OR     LEG SURGERY  1975    lump removed     MASTECTOMY SIMPLE, SENTINEL NODE, COMBINED Right 12/14/2018    Procedure: RIGHT MASTECTOMY WITH RIGHT SENTINEL NODE BIOPSY (  COLT );  Surgeon: Adolfo Perales MD;  Location:  OR     TONSILLECTOMY  1964     TUBAL LIGATION  1993         SOCIAL HISTORY:  Social History     Socioeconomic History     Marital status: Single     Spouse name: Not on file     Number of children: Not on file     Years of education: Not on file     Highest education level: Not on file   Occupational History     Not on file   Social Needs     Financial resource strain: Not on file     Food insecurity:     Worry: Not on file     Inability: Not on file     Transportation needs:     Medical: Not on file     Non-medical: Not on file   Tobacco Use     Smoking status: Former Smoker     Last attempt to quit:  10/19/1997     Years since quittin.8     Smokeless tobacco: Never Used   Substance and Sexual Activity     Alcohol use: Yes     Comment: 3 beer - 3 times a week     Drug use: No     Sexual activity: Yes     Partners: Male     Birth control/protection: Surgical     Comment: tubal ligation -  no children   Lifestyle     Physical activity:     Days per week: Not on file     Minutes per session: Not on file     Stress: Not on file   Relationships     Social connections:     Talks on phone: Not on file     Gets together: Not on file     Attends [employer] service: Not on file     Active member of club or organization: Not on file     Attends meetings of clubs or organizations: Not on file     Relationship status: Not on file     Intimate partner violence:     Fear of current or ex partner: Not on file     Emotionally abused: Not on file     Physically abused: Not on file     Forced sexual activity: Not on file   Other Topics Concern     Parent/sibling w/ CABG, MI or angioplasty before 65F 55M? No   Social History Narrative     Not on file     Winnie does not have any children.  She has had a total hysterectomy and bilateral oophorectomy and took estrogen patch for about 10 years up until her breast cancer diagnosis.  She quit smoking many years ago.  She drinks alcohol 1-2 drinks a week on weekends.  She lives with her significant other in [place].          FAMILY HISTORY:  Family History   Problem Relation Age of Onset     Osteoporosis Mother      Breast Cancer Mother      Prostate Cancer Father      Lipids Brother      Breast Cancer Sister    Her mother had breast cancer and has lumpectomy at age 90.  Her sister has bilateral mastectomy at age 60 for breast cancer as well.      PHYSICAL EXAM:  Vital signs:  /82   Pulse 68   Temp 97.8  F (36.6  C)   Resp 16   Wt 100.7 kg (222 lb)   SpO2 100%   BMI 33.26 kg/m   ECO  GENERAL/CONSTITUTIONAL: No acute distress.   EYES: No scleral  icterus.  LNEUROLOGIC: Alert, oriented, answers questions appropriately.  INTEGUMENTARY: No jaundice.  GAIT: Steady, does not use assistive device      LABS:  None today.      IMAGING:  Bilateral mammogram 10/31/18:  BREAST DENSITY: Heterogeneously dense.     COMMENTS: Possible developing architectural distortion in the RIGHT  breast at approximately 8:00-10:00 position, 6 to 7 cm from the  nipple. Also, there is an enlarged RIGHT axillary lymph node. No  concerning findings in the left breast.                                                                       IMPRESSION: BI-RADS CATEGORY: 0 - Need Additional Imaging Evaluation  and/or Prior Mammograms for Comparison.    Right breast ultrasound 11/1/18:  FINDINGS:  Targeted ultrasound was performed. At the 10:00 position of  the right breast, 7 cm from the nipple, there is an ill-defined area  of hypoechoic tissue which measures approximately 2.8 cm in maximum  dimension.     Survey of the right axilla shows a enlarged, partially  cystic-appearing mass measuring 1.9 x 1.0 x 1.1 cm.                                                                       IMPRESSION: BI-RADS CATEGORY: 4 - Suspicious Abnormality-Biopsy Should  Be Considered.  Ill-defined hypoechoic tissue at the 10:00 position of the right  breast and a partially cystic right axillary mass. Ultrasound-guided  core biopsy x2 is recommended. Results and recommendation were  discussed with the patient during her appointment.    MRI breast 11/18/18:  1. Segmental distribution of clumped non-mass enhancement encompassing  nearly the entire upper outer quadrant of the RIGHT breast measures up  to 14.4 cm in maximal dimension and comes to within 1.5 cm of the  nipple. Given the biopsy results, this is concerning for DCIS. If it  would be of clinical use, additional biopsy could be performed more  anteriorly in the breast to more definitively establish the extent of  disease prior to surgery. If this is  desired, recommend additional  evaluation with ultrasound and possible biopsy, but if no target is  found, MRI guided biopsy could be performed.  2. No concerning areas of enhancement in the LEFT breast.  3. No lymphadenopathy.    DEXA 1/14/19:  Normal lumbar spine and bilateral hip bone density.        ASSESSMENT/PLAN:  Ila Pierre is a 63 year old female with:    1) Right-sided breast cancer, upper outer quadrant: On 12/14/18, she underwent right mastectomy and right axillary sentinel lymph node biopsy, with reconstruction.  Pathology showed infiltrating ductal carcinoma, grade 1, forming multiple foci up to 3 mm, DCIS intermediate nuclear grade, forming multiple foci.  There was intraductal papilloma with atypia identified.  ER positive (98%), MI positive (98%), HER-2 negative, margins negative, 0 of 2 lymph nodes involved, stage [id number] (stage IA).  Oncotype DX score is 10.    She started anastrozole around 1/15/19.  She is getting more hot flashes and myalgias/arthralgias.  She tried exemestane and letrozole, and both felt worse than when she was taking anastrozole.  She feels she may be getting depressed too.  She would like to take a break from the hormonal medications.      -will take a break from the hormonal medications for about a month.  She has appointment with me already in September 2019, and we will see if her symptoms improve and further options at that point.  We discussed either going back on anastrozole or trying tamoxifen.  We will re-discuss at the September visit.    -I offered Effexor.  She says that she will consider it, but wait for now and see how she feels with the break from hormonal therapy.  She will call if she would like to start Effexor.    -plan for at least 5 years or hormonal treatment if can tolerate  -annual left-sided mammograms - next one will be in November 2019 - will order at the next visit    Of note, patient may move to [place] around November/December 2019.   She will let us know her plans at the next visit.    2) Bone health: DEXA scan on 1/14/19 was normal  -next DEXA scan will be in January 2021  -she will take calcium and vitamin D, do weight bearing exercises      I spent a total of 25 minutes with the patient, with over >50% of the time in counseling and/or coordination of care.       Belkis Molina MD  Hematology/Oncology  [facility]      Again, thank you for allowing me to participate in the care of your patient.        Sincerely,        Belkis Molina MD

## 2019-08-02 NOTE — PROGRESS NOTES
[facility]    Hematology/Oncology Established Patient Note      Today's Date: 08/02/19    Reason for Follow-up: right breast cancer      HISTORY OF PRESENT ILLNESS: Ila Pierre is a 63 year old female with PMHx of HTN, COPD who presents with right-sided breast cancer.   She undergone a screening mammogram, which found a new right breast mass.  On 12/14/18, she underwent right mastectomy and right axillary sentinel lymph node biopsy by Dr. Perales, with reconstruction by Dr. Rees.  Pathology showed infiltrating ductal carcinoma, grade 1, forming multiple foci up to 3 mm, DCIS intermediate nuclear grade, forming multiple foci.  There was intraductal papilloma with atypia identified.  ER positive (98%), CO positive (98%), HER-2 negative, margins negative, 0 of 2 lymph nodes involved, stage [id number] (stage IA). Oncotype DX score is 10.    She started anastrozole around 1/15/19.  Due hot flashes and bone pain, she switched to exemestane, but she felt worse on it.  She then tried letrozole, but, again, felt worse on that as well.      INTERIM HISTORY: Winnie is here for follow-up today.  She has been on letrozole, but feels that the side effects were worse than anastrozole.  She is having more hot flashes and bone pains.  She feels that her quality of life is worse.      REVIEW OF SYSTEMS:   14 point ROS was reviewed and is negative other than as noted above in HPI.       HOME MEDICATIONS:  Current Outpatient Medications   Medication Sig Dispense Refill     atenolol (TENORMIN) 50 MG tablet TAKE 1 TABLET(50 MG) BY MOUTH DAILY (Patient taking differently: Take 50 mg by mouth every evening Takes at 18:00) 90 tablet 3     Calcium Carb-Cholecalciferol (CALCIUM 1000 + D) 1000-800 MG-UNIT TABS Take by mouth daily       Cholecalciferol (VITAMIN D3 PO) Take 5,000 Units by mouth every evening Takes at 18:00       l-tyrosine 500 MG TABS Take 2 tablets by mouth daily.       letrozole (FEMARA) 2.5 MG  tablet Take 1 tablet (2.5 mg) by mouth daily 60 tablet 0     Misc Natural Products (GLUCOSAMINE CHOND MSM FORMULA PO) Take 1 tablet by mouth every evening (Glucosamine 500 mg Chondroitin 400 mg  mg) takes at 18:00       Multiple Vitamins-Minerals (CENTRUM SILVER) per tablet Take 1 tablet by mouth every evening        Omega-3 Fatty Acids (FISH OIL PO) Take 600 mg by mouth every evening Takes at 18:00       order for DME Equipment being ordered: post mastectomy garment.     Scheduled for right mastectomy, right SLNB, possible axillary node dissection, tissue expander on 12/14/18. 2 Units 0     Probiotic Product (PROBIOTIC DAILY PO) Take 1 capsule by mouth every evening Acidophilus with goats milk 343 mg- 10 mg.  Takes at 18:00       aspirin 81 MG tablet Take 81 mg by mouth daily            ALLERGIES:  Allergies   Allergen Reactions     Penicillins      As a child     Codeine Sulfate Diarrhea         PAST MEDICAL HISTORY:  Past Medical History:   Diagnosis Date     Breast mass      Cancer (H)     right breast     COPD (chronic obstructive pulmonary disease) (H)      Herpes labialis      Hypertension goal BP (blood pressure) < 140/90      Menopausal symptoms      Palpitations      PONV (postoperative nausea and vomiting)          PAST SURGICAL HISTORY:  Past Surgical History:   Procedure Laterality Date     COLONOSCOPY  8-21-08     DISSECT LYMPH NODE AXILLA Right 12/14/2018    Procedure: AXILLARY NODE DISECTION ( COLT );  Surgeon: Adolfo Perales MD;  Location:  OR      TOOTH EXTRACTION W/FORCEP  1973    wisdom teeth     HYSTERECTOMY  2005    for cyst and endometiosis     HYSTERECTOMY, PAP NO LONGER INDICATED       INSERT TISSUE EXPANDER BREAST Bilateral 12/14/2018    Procedure: INSERT RIGHT  TISSUE EXPANDER BREAST ( ARIELECK );  Surgeon: Mio Rodríguez MD;  Location:  OR     LEG SURGERY  1975    lump removed     MASTECTOMY SIMPLE, SENTINEL NODE, COMBINED Right 12/14/2018    Procedure: RIGHT  MASTECTOMY WITH RIGHT SENTINEL NODE BIOPSY (  COLT );  Surgeon: Adolfo Perales MD;  Location: [facility] OR     TONSILLECTOMY  1964     TUBAL LIGATION           SOCIAL HISTORY:  Social History     Socioeconomic History     Marital status: Single     Spouse name: Not on file     Number of children: Not on file     Years of education: Not on file     Highest education level: Not on file   Occupational History     Not on file   Social Needs     Financial resource strain: Not on file     Food insecurity:     Worry: Not on file     Inability: Not on file     Transportation needs:     Medical: Not on file     Non-medical: Not on file   Tobacco Use     Smoking status: Former Smoker     Last attempt to quit: 10/19/1997     Years since quittin.8     Smokeless tobacco: Never Used   Substance and Sexual Activity     Alcohol use: Yes     Comment: 3 beer - 3 times a week     Drug use: No     Sexual activity: Yes     Partners: Male     Birth control/protection: Surgical     Comment: tubal ligation -  no children   Lifestyle     Physical activity:     Days per week: Not on file     Minutes per session: Not on file     Stress: Not on file   Relationships     Social connections:     Talks on phone: Not on file     Gets together: Not on file     Attends [employer] service: Not on file     Active member of club or organization: Not on file     Attends meetings of clubs or organizations: Not on file     Relationship status: Not on file     Intimate partner violence:     Fear of current or ex partner: Not on file     Emotionally abused: Not on file     Physically abused: Not on file     Forced sexual activity: Not on file   Other Topics Concern     Parent/sibling w/ CABG, MI or angioplasty before 65F 55M? No   Social History Narrative     Not on file     Winnie does not have any children.  She has had a total hysterectomy and bilateral oophorectomy and took estrogen patch for about 10 years up until her breast cancer diagnosis.  She  quit smoking many years ago.  She drinks alcohol 1-2 drinks a week on weekends.  She lives with her significant other in [place].          FAMILY HISTORY:  Family History   Problem Relation Age of Onset     Osteoporosis Mother      Breast Cancer Mother      Prostate Cancer Father      Lipids Brother      Breast Cancer Sister    Her mother had breast cancer and has lumpectomy at age 90.  Her sister has bilateral mastectomy at age 60 for breast cancer as well.      PHYSICAL EXAM:  Vital signs:  /82   Pulse 68   Temp 97.8  F (36.6  C)   Resp 16   Wt 100.7 kg (222 lb)   SpO2 100%   BMI 33.26 kg/m  ECO  GENERAL/CONSTITUTIONAL: No acute distress.   EYES: No scleral icterus.  LNEUROLOGIC: Alert, oriented, answers questions appropriately.  INTEGUMENTARY: No jaundice.  GAIT: Steady, does not use assistive device      LABS:  None today.      IMAGING:  Bilateral mammogram 10/31/18:  BREAST DENSITY: Heterogeneously dense.     COMMENTS: Possible developing architectural distortion in the RIGHT  breast at approximately 8:00-10:00 position, 6 to 7 cm from the  nipple. Also, there is an enlarged RIGHT axillary lymph node. No  concerning findings in the left breast.                                                                       IMPRESSION: BI-RADS CATEGORY: 0 - Need Additional Imaging Evaluation  and/or Prior Mammograms for Comparison.    Right breast ultrasound 18:  FINDINGS:  Targeted ultrasound was performed. At the 10:00 position of  the right breast, 7 cm from the nipple, there is an ill-defined area  of hypoechoic tissue which measures approximately 2.8 cm in maximum  dimension.     Survey of the right axilla shows a enlarged, partially  cystic-appearing mass measuring 1.9 x 1.0 x 1.1 cm.                                                                       IMPRESSION: BI-RADS CATEGORY: 4 - Suspicious Abnormality-Biopsy Should  Be Considered.  Ill-defined hypoechoic tissue at the 10:00 position  of the right  breast and a partially cystic right axillary mass. Ultrasound-guided  core biopsy x2 is recommended. Results and recommendation were  discussed with the patient during her appointment.    MRI breast 11/18/18:  1. Segmental distribution of clumped non-mass enhancement encompassing  nearly the entire upper outer quadrant of the RIGHT breast measures up  to 14.4 cm in maximal dimension and comes to within 1.5 cm of the  nipple. Given the biopsy results, this is concerning for DCIS. If it  would be of clinical use, additional biopsy could be performed more  anteriorly in the breast to more definitively establish the extent of  disease prior to surgery. If this is desired, recommend additional  evaluation with ultrasound and possible biopsy, but if no target is  found, MRI guided biopsy could be performed.  2. No concerning areas of enhancement in the LEFT breast.  3. No lymphadenopathy.    DEXA 1/14/19:  Normal lumbar spine and bilateral hip bone density.        ASSESSMENT/PLAN:  Ila Pierre is a 63 year old female with:    1) Right-sided breast cancer, upper outer quadrant: On 12/14/18, she underwent right mastectomy and right axillary sentinel lymph node biopsy, with reconstruction.  Pathology showed infiltrating ductal carcinoma, grade 1, forming multiple foci up to 3 mm, DCIS intermediate nuclear grade, forming multiple foci.  There was intraductal papilloma with atypia identified.  ER positive (98%), TN positive (98%), HER-2 negative, margins negative, 0 of 2 lymph nodes involved, stage [id number] (stage IA).  Oncotype DX score is 10.    She started anastrozole around 1/15/19.  She is getting more hot flashes and myalgias/arthralgias.  She tried exemestane and letrozole, and both felt worse than when she was taking anastrozole.  She feels she may be getting depressed too.  She would like to take a break from the hormonal medications.      -will take a break from the hormonal medications for about  a month.  She has appointment with me already in September 2019, and we will see if her symptoms improve and further options at that point.  We discussed either going back on anastrozole or trying tamoxifen.  We will re-discuss at the September visit.    -I offered Effexor.  She says that she will consider it, but wait for now and see how she feels with the break from hormonal therapy.  She will call if she would like to start Effexor.    -plan for at least 5 years or hormonal treatment if can tolerate  -annual left-sided mammograms - next one will be in November 2019 - will order at the next visit    Of note, patient may move to [place] around November/December 2019.  She will let us know her plans at the next visit.    2) Bone health: DEXA scan on 1/14/19 was normal  -next DEXA scan will be in January 2021  -she will take calcium and vitamin D, do weight bearing exercises      I spent a total of 25 minutes with the patient, with over >50% of the time in counseling and/or coordination of care.       Belkis Molina MD  Hematology/Oncology  [facility]

## 2019-08-27 ENCOUNTER — OFFICE VISIT (OUTPATIENT)
Dept: FAMILY MEDICINE | Facility: CLINIC | Age: 64
End: 2019-08-27
Payer: COMMERCIAL

## 2019-08-27 VITALS
TEMPERATURE: 97.9 F | WEIGHT: 226.5 LBS | RESPIRATION RATE: 20 BRPM | HEART RATE: 72 BPM | SYSTOLIC BLOOD PRESSURE: 104 MMHG | BODY MASS INDEX: 33.94 KG/M2 | DIASTOLIC BLOOD PRESSURE: 76 MMHG | OXYGEN SATURATION: 99 %

## 2019-08-27 DIAGNOSIS — M79.10 MYALGIA: Primary | ICD-10-CM

## 2019-08-27 PROCEDURE — 99214 OFFICE O/P EST MOD 30 MIN: CPT | Performed by: PHYSICIAN ASSISTANT

## 2019-08-27 PROCEDURE — 36415 COLL VENOUS BLD VENIPUNCTURE: CPT | Performed by: PHYSICIAN ASSISTANT

## 2019-08-27 PROCEDURE — 84550 ASSAY OF BLOOD/URIC ACID: CPT | Performed by: PHYSICIAN ASSISTANT

## 2019-08-27 PROCEDURE — 86618 LYME DISEASE ANTIBODY: CPT | Performed by: PHYSICIAN ASSISTANT

## 2019-08-27 NOTE — PROGRESS NOTES
Subjective     Ila Pierre is a 63 year old female who presents to clinic today for the following health issues:    HPI       Additonal concerns: medical marijuana.    [place]    Joint Pain    Onset: ongoing. Since masectomy    Description:   Location: knees, ankles, shoulders  Character: strong ache    Intensity: severe    Progression of Symptoms: worse    Accompanying Signs & Symptoms:  Other symptoms: none    History:   Previous similar pain: YES      Precipitating factors:   Trauma or overuse: no     Alleviating factors:  Improved by: nothing    Therapies Tried and outcome: Aleve not effective    Reviewed and updated as needed this visit by Provider  Tobacco  Allergies  Meds  Problems  Med Hx  Surg Hx  Fam Hx  Soc Hx          Additional complaints: None    HPI additional notes: Winnie presents today with   Chief Complaint   Patient presents with     Musculoskeletal Problem   Pain in shoulders, hips and down legs.  Sleeps on her left side is a little worse because she sleeps on it.  Had ibuprofen which helped with reconstruction.  Aleve with no improvement.           Review of Systems   C: Negative for fever, chills, recent change in weight  Skin: Negative for worrisome rashes or lesions  Resp: Negative for significant cough or SOB  CV: Negative for chest pain or peripheral edema  GI: Negative for nausea, abdominal pain, heartburn, or change in bowel habits  MS: Positive for bilateral knee, bilateral hip, bilateral leg, bilateral shoulder pain  NEURO: NEGATIVE for numbness, tingling, or radicular pain.  P: Negative for changes in mood or affect  ROS otherwise negative.        Objective    /76 (BP Location: Left arm, Patient Position: Sitting, Cuff Size: Adult Large)   Pulse 72   Temp 97.9  F (36.6  C) (Tympanic)   Resp 20   Wt 102.7 kg (226 lb 8 oz)   SpO2 99%   BMI 33.94 kg/m    Body mass index is 33.94 kg/m .  Physical Exam   GENERAL: healthy,  alert, in no acute distress  HENT: Mucous mebranes moist.  SKIN: no suspicious lesions, no rashes  PSYCH:Mental Status Exam  Behavior: cooperative, pleasant, calm and agitated  Speech: slowed  Mood: anxious  Affect: Subdued  Thought Processes: Logical and Goal directed  Thought Content: no evidence of suicidal or homicidal ideation and no overt psychosis  Insight: Good  Judgment: Good      Diagnostic test results:  Pending        Assessment & Plan       ICD-10-CM    1. Myalgia M79.10 Lyme Disease Naina with reflex to WB Serum     Uric acid     Will check labs today to rule out causes of joint pain.  If labs are normal, likely due to cancer and treatment.      Please see patient instructions for treatment details.    Return in about 2 weeks (around 9/10/2019) for Recheck if not improving.    Aleshia Meléndez PA-C  [facility]

## 2019-08-27 NOTE — LETTER
August 29, 2019      Ila Pierre  [address] [facility] [address]        Dear ,    We are writing to inform you of your test results.    - Your Uric Acid level is normal  - Your Lyme Disease Antibody was negative for infection.      Resulted Orders   Lyme Disease Naina with reflex to WB Serum   Result Value Ref Range    Lyme Disease Antibodies Serum 0.10 0.00 - 0.89      Comment:      Negative, Absence of detectable Borrelia burdorferi antibodies. A negative   result does not exclude the possibility of Borrelia burgdorferi infection. If   early Lyme disease is suspected, a second sample should be collected and   tested 2 to 4 weeks later.     Uric acid   Result Value Ref Range    Uric Acid 5.2 2.6 - 6.0 mg/dL       If you have any questions or concerns, please call the clinic at the number listed above.       Sincerely,        Aleshia Meléndez PA-C

## 2019-08-27 NOTE — PATIENT INSTRUCTIONS
Liz does not prescribe medical marijuana.    [email or web address]    [email or web address]    Medical Cannabis Qualifying Conditions  Only patients who are legal [place] residents and have been diagnosed with one of the qualifying conditions are eligible to receive medical cannabis in [place].  Qualifying Conditions:  Cancer associated with severe/chronic pain, nausea  or severe vomiting, or cachexia or severe wasting.   Glaucoma.   HIV/AIDS.   Tourette Syndrome.   Amyotrophic Lateral Sclerosis (ALS).   Seizures, including those characteristic of Epilepsy.   Severe and persistent muscle spasms, including  those characteristic of Multiple Sclerosis.   Inflammatory bowel disease, including Crohn s disease.   Terminal illness, with a probable life expectancy of less  than one year*   Intractable pain   Post-Traumatic Stress Disorder  Conditionally effective - patients can enroll July 1, 2018 and  medication August 1, 2018:  Autism    Obstructive Sleep Apnea  *To qualify for the program, you must suffer from cancer or a terminal illness with a probable life expectancy of under one year, if your illness or its treatment produces one or more of the following: severe or chronic pain; nausea or severe vomiting; or Cachexia or severe wasting.      [facility]  [facility] [address]  [place] [address]   Phone: [phone]  Fax: [phone]  [email or web address]    [facility]  [address] [place] [id number]  Specialties: Medical Cannabis Certification - Pain Management  Hours: Monday - Friday 9am - 5pm  Phone: [phone]  Initial Visit: $250 - Renewal: $250    [facility] [place] [facility]  [address] [facility] [place] [address] [phone]  Hours: Monday - Thursday: 9:00 AM - 6:00  PM, Friday 9:00 AM - 5:00 PM    Dr Thakkar at [facility] offers evaluations and certifications for patients who may be eligible for the [place] Medical Cannabis Program in the [facility]. If you are looking for a marijuana doctor, we welcome you to contact Dr Thakkar. Patients from [facility] and the surrounding [facility] who have searched for a doctor that can prescribe cannabis for treatment, have used our services and been very pleased with their experience at [facility].    This service is a cash only option. It is not covered by your health care plan. The fee to be certified is $250. This fee is for the certification process only, not regular medical care or any testing necessary for the certification process, which may be covered by your health care plan.  Please note that [facility] of Health Office of Medical Cannabis will charge you its own processing fees.    Our providers will perform an evaluation, determine eligibility, register the patient into the [place] Board of Medical Cannabis Registration System, and provide a signed Medical Cannabis Certificate. We provide our patients with everything they need.    We offer continued comprehensive care for all patient's conditions, including those qualified for the Medical Cannabis Program.  Ongoing follow-up and care for the qualified conditions is required under [place] law.  This care should be covered by your health care plan. You can make a regular appointment with Dr. Thakkar by calling [phone].  Dr. Thakkar also offers free phone consultations for the purpose of figuring out what type of an appointment may be most appropriate.  For example, most regular appointments are scheduled for 30 min, but a homeopathic intake appointment requires two hours.  If you are not sure what type of therapy may be best in your situation, Dr. Thakkar can speak with you by phone and suggest the best therapy to start with, and therefore the most appropriate  appointment type. He can also answer any questions you may have about the therapies which could be considered in your case.    You can request the free phone consultation by calling [phone]   [email or web address]

## 2019-08-28 ENCOUNTER — PATIENT OUTREACH (OUTPATIENT)
Dept: ONCOLOGY | Facility: CLINIC | Age: 64
End: 2019-08-28

## 2019-08-28 LAB
B BURGDOR IGG+IGM SER QL: 0.1 (ref 0–0.89)
URATE SERPL-MCNC: 5.2 MG/DL (ref 2.6–6)

## 2019-08-28 NOTE — PROGRESS NOTES
Ila called clinic stating that she is still experiencing joint pain/sleeplessness secondary to the AI's that she has taken in the past. She is currently taking a break from endocrine therapy and will discuss starting Tamoxifen on 9/17/19. Her primary care physician just tested her for Gout and Lymes disease. Ila is wondering if Dr. Molina would consider medical cannabis for her. Krystyna Maloney RN,BSN,OCN

## 2019-08-28 NOTE — PROGRESS NOTES
Called Winnie she is aware that Dr. Molina will certify her and that she will receive an e mail from the [place] Department of Health. Krystyna Maloney RN,BSN,OCN

## 2019-08-29 NOTE — RESULT ENCOUNTER NOTE
Lab letter printed and signed.  Message comments below:  - Your Uric Acid level is normal  - Your Lyme Disease Antibody was negative for infection.

## 2019-09-17 ENCOUNTER — ONCOLOGY VISIT (OUTPATIENT)
Dept: ONCOLOGY | Facility: CLINIC | Age: 64
End: 2019-09-17
Attending: INTERNAL MEDICINE
Payer: COMMERCIAL

## 2019-09-17 VITALS
OXYGEN SATURATION: 99 % | WEIGHT: 226.8 LBS | DIASTOLIC BLOOD PRESSURE: 81 MMHG | HEART RATE: 76 BPM | RESPIRATION RATE: 18 BRPM | SYSTOLIC BLOOD PRESSURE: 150 MMHG | TEMPERATURE: 98.5 F | HEIGHT: 69 IN | BODY MASS INDEX: 33.59 KG/M2

## 2019-09-17 DIAGNOSIS — C50.911 MALIGNANT NEOPLASM OF RIGHT BREAST IN FEMALE, ESTROGEN RECEPTOR POSITIVE, UNSPECIFIED SITE OF BREAST (H): Primary | ICD-10-CM

## 2019-09-17 DIAGNOSIS — Z17.0 MALIGNANT NEOPLASM OF RIGHT BREAST IN FEMALE, ESTROGEN RECEPTOR POSITIVE, UNSPECIFIED SITE OF BREAST (H): Primary | ICD-10-CM

## 2019-09-17 DIAGNOSIS — Z12.39 BREAST CANCER SCREENING: ICD-10-CM

## 2019-09-17 PROCEDURE — 99214 OFFICE O/P EST MOD 30 MIN: CPT | Performed by: INTERNAL MEDICINE

## 2019-09-17 PROCEDURE — G0463 HOSPITAL OUTPT CLINIC VISIT: HCPCS

## 2019-09-17 RX ORDER — TAMOXIFEN CITRATE 20 MG/1
20 TABLET ORAL DAILY
Qty: 90 TABLET | Refills: 3 | Status: SHIPPED | OUTPATIENT
Start: 2019-09-17 | End: 2019-10-22

## 2019-09-17 ASSESSMENT — PAIN SCALES - GENERAL: PAINLEVEL: SEVERE PAIN (6)

## 2019-09-17 ASSESSMENT — MIFFLIN-ST. JEOR: SCORE: 1645.2

## 2019-09-17 NOTE — LETTER
"    9/17/2019         RE: Ila Pierre  [address]  [facility] [address]        Dear Colleague,    Thank you for referring your patient, Ila Pierre, to the [facility]. Please see a copy of my visit note below.       Oncology Rooming Note    September 17, 2019 1:27 PM   Ila Pierre is a 63 year old female who presents for:    Chief Complaint   Patient presents with     Oncology Clinic Visit     Breast cancer (H)     Initial Vitals: BP (!) 150/81 (BP Location: Left arm, Patient Position: Sitting, Cuff Size: Adult Large)   Pulse 76   Temp 98.5  F (36.9  C) (Oral)   Resp 18   Ht 1.74 m (5' 8.5\")   Wt 102.9 kg (226 lb 12.8 oz)   SpO2 99%   BMI 33.98 kg/m    Estimated body mass index is 33.98 kg/m  as calculated from the following:    Height as of this encounter: 1.74 m (5' 8.5\").    Weight as of this encounter: 102.9 kg (226 lb 12.8 oz). Body surface area is 2.23 meters squared.  Severe Pain (6) Comment: Data Unavailable   No LMP recorded. Patient has had a hysterectomy.  Allergies reviewed: Yes  Medications reviewed: Yes    Medications: MEDICATION REFILLS NEEDED TODAY. Provider was notified. Refill MASTECTOMY GARMENT  Pharmacy name entered into [employer]: [id number] [place] [address]    Clinical concerns: no        Christelle Grimes MA              [facility]    Hematology/Oncology Established Patient Note      Today's Date: 09/17/19    Reason for Follow-up: right breast cancer      HISTORY OF PRESENT ILLNESS: Ila Pierre is a 63 year old female with PMHx of HTN, COPD who presents with right-sided breast cancer.   She undergone a screening mammogram, which found a new right breast mass.  On 12/14/18, she underwent right mastectomy and right axillary sentinel lymph node biopsy by Dr. Perales, with reconstruction by Dr. Rees.  Pathology showed infiltrating ductal carcinoma, grade 1, forming multiple foci up to 3 mm, " DCIS intermediate nuclear grade, forming multiple foci.  There was intraductal papilloma with atypia identified.  ER positive (98%), MO positive (98%), HER-2 negative, margins negative, 0 of 2 lymph nodes involved, stage reD5hH3 (stage IA). Oncotype DX score is 10.    She started anastrozole around 1/15/19.  Due hot flashes and bone pain, she switched to exemestane, but she felt worse on it.  She then tried letrozole, but, again, felt worse on that as well.  She took a break from hormonal medications on 8/2/19.        INTERIM HISTORY: Winnie is here for follow-up today.  She has been off of hormonal medication since last month.   She is feeling a bit better.  She wants to try again, this time with tamoxifen and see how she feels.  She still has some myalgias, but is better now.        REVIEW OF SYSTEMS:   14 point ROS was reviewed and is negative other than as noted above in HPI.       HOME MEDICATIONS:  Current Outpatient Medications   Medication Sig Dispense Refill     aspirin 81 MG tablet Take 81 mg by mouth daily        atenolol (TENORMIN) 50 MG tablet TAKE 1 TABLET(50 MG) BY MOUTH DAILY (Patient taking differently: Take 50 mg by mouth every evening Takes at 18:00) 90 tablet 3     Calcium Carb-Cholecalciferol (CALCIUM 1000 + D) 1000-800 MG-UNIT TABS Take by mouth daily       Cholecalciferol (VITAMIN D3 PO) Take 5,000 Units by mouth every evening Takes at 18:00       l-tyrosine 500 MG TABS Take 2 tablets by mouth daily.       Misc Natural Products (GLUCOSAMINE CHOND MSM FORMULA PO) Take 1 tablet by mouth every evening (Glucosamine 500 mg Chondroitin 400 mg  mg) takes at 18:00       Multiple Vitamins-Minerals (CENTRUM SILVER) per tablet Take 1 tablet by mouth every evening        Omega-3 Fatty Acids (FISH OIL PO) Take 600 mg by mouth every evening Takes at 18:00       order for DME Equipment being ordered: post mastectomy garment.     Scheduled for right mastectomy, right SLNB, possible axillary node  dissection, tissue expander on 12/14/18. 2 Units 0     Probiotic Product (PROBIOTIC DAILY PO) Take 1 capsule by mouth every evening Acidophilus with goats milk 343 mg- 10 mg.  Takes at 18:00       TAMOXIFEN CITRATE PO            ALLERGIES:  Allergies   Allergen Reactions     Penicillins      As a child     Codeine Sulfate Diarrhea         PAST MEDICAL HISTORY:  Past Medical History:   Diagnosis Date     Breast mass      Cancer (H)     right breast     COPD (chronic obstructive pulmonary disease) (H)      Herpes labialis      Hypertension goal BP (blood pressure) < 140/90      Menopausal symptoms      Palpitations      PONV (postoperative nausea and vomiting)          PAST SURGICAL HISTORY:  Past Surgical History:   Procedure Laterality Date     COLONOSCOPY  8-21-08     DISSECT LYMPH NODE AXILLA Right 12/14/2018    Procedure: AXILLARY NODE DISECTION ( COLT );  Surgeon: Adolfo Perales MD;  Location:  OR      TOOTH EXTRACTION W/FORCEP  1973    wisdom teeth     HYSTERECTOMY  2005    for cyst and endometiosis     HYSTERECTOMY, PAP NO LONGER INDICATED       INSERT TISSUE EXPANDER BREAST Bilateral 12/14/2018    Procedure: INSERT RIGHT  TISSUE EXPANDER BREAST ( LASHAY );  Surgeon: Mio Rodríguez MD;  Location:  OR     LEG SURGERY  1975    lump removed     MASTECTOMY SIMPLE, SENTINEL NODE, COMBINED Right 12/14/2018    Procedure: RIGHT MASTECTOMY WITH RIGHT SENTINEL NODE BIOPSY (  COLT );  Surgeon: Adolfo Perales MD;  Location:  OR     TONSILLECTOMY  1964     TUBAL LIGATION  1993         SOCIAL HISTORY:  Social History     Socioeconomic History     Marital status: Single     Spouse name: Not on file     Number of children: Not on file     Years of education: Not on file     Highest education level: Not on file   Occupational History     Not on file   Social Needs     Financial resource strain: Not on file     Food insecurity:     Worry: Not on file     Inability: Not on file     Transportation needs:      Medical: Not on file     Non-medical: Not on file   Tobacco Use     Smoking status: Former Smoker     Last attempt to quit: 10/19/1997     Years since quittin.9     Smokeless tobacco: Never Used   Substance and Sexual Activity     Alcohol use: Yes     Comment: 3 beer - 3 times a week     Drug use: No     Sexual activity: Yes     Partners: Male     Birth control/protection: Surgical     Comment: tubal ligation -  no children   Lifestyle     Physical activity:     Days per week: Not on file     Minutes per session: Not on file     Stress: Not on file   Relationships     Social connections:     Talks on phone: Not on file     Gets together: Not on file     Attends Taoist service: Not on file     Active member of club or organization: Not on file     Attends meetings of clubs or organizations: Not on file     Relationship status: Not on file     Intimate partner violence:     Fear of current or ex partner: Not on file     Emotionally abused: Not on file     Physically abused: Not on file     Forced sexual activity: Not on file   Other Topics Concern     Parent/sibling w/ CABG, MI or angioplasty before 65F 55M? No   Social History Narrative     Not on file     Winnie does not have any children.  She has had a total hysterectomy and bilateral oophorectomy and took estrogen patch for about 10 years up until her breast cancer diagnosis.  She quit smoking many years ago.  She drinks alcohol 1-2 drinks a week on weekends.  She lives with her significant other in [place].          FAMILY HISTORY:  Family History   Problem Relation Age of Onset     Osteoporosis Mother      Breast Cancer Mother      Prostate Cancer Father      Lipids Brother      Breast Cancer Sister    Her mother had breast cancer and has lumpectomy at age 90.  Her sister has bilateral mastectomy at age 60 for breast cancer as well.      PHYSICAL EXAM:  Vital signs:  BP (!) 150/81 (BP Location: Left arm, Patient Position: Sitting, Cuff Size:  "Adult Large)   Pulse 76   Temp 98.5  F (36.9  C) (Oral)   Resp 18   Ht 1.74 m (5' 8.5\")   Wt 102.9 kg (226 lb 12.8 oz)   SpO2 99%   BMI 33.98 kg/m   ECO  GENERAL/CONSTITUTIONAL: No acute distress.   EYES: No scleral icterus.  LNEUROLOGIC: Alert, oriented, answers questions appropriately.  INTEGUMENTARY: No jaundice.  GAIT: Steady, does not use assistive device      LABS:  None today.      IMAGING:  Bilateral mammogram 10/31/18:  BREAST DENSITY: Heterogeneously dense.     COMMENTS: Possible developing architectural distortion in the RIGHT  breast at approximately 8:00-10:00 position, 6 to 7 cm from the  nipple. Also, there is an enlarged RIGHT axillary lymph node. No  concerning findings in the left breast.                                                                       IMPRESSION: BI-RADS CATEGORY: 0 - Need Additional Imaging Evaluation  and/or Prior Mammograms for Comparison.    Right breast ultrasound 18:  FINDINGS:  Targeted ultrasound was performed. At the 10:00 position of  the right breast, 7 cm from the nipple, there is an ill-defined area  of hypoechoic tissue which measures approximately 2.8 cm in maximum  dimension.     Survey of the right axilla shows a enlarged, partially  cystic-appearing mass measuring 1.9 x 1.0 x 1.1 cm.                                                                       IMPRESSION: BI-RADS CATEGORY: 4 - Suspicious Abnormality-Biopsy Should  Be Considered.  Ill-defined hypoechoic tissue at the 10:00 position of the right  breast and a partially cystic right axillary mass. Ultrasound-guided  core biopsy x2 is recommended. Results and recommendation were  discussed with the patient during her appointment.    MRI breast 18:  1. Segmental distribution of clumped non-mass enhancement encompassing  nearly the entire upper outer quadrant of the RIGHT breast measures up  to 14.4 cm in maximal dimension and comes to within 1.5 cm of the  nipple. Given the biopsy " results, this is concerning for DCIS. If it  would be of clinical use, additional biopsy could be performed more  anteriorly in the breast to more definitively establish the extent of  disease prior to surgery. If this is desired, recommend additional  evaluation with ultrasound and possible biopsy, but if no target is  found, MRI guided biopsy could be performed.  2. No concerning areas of enhancement in the LEFT breast.  3. No lymphadenopathy.    DEXA 1/14/19:  Normal lumbar spine and bilateral hip bone density.        ASSESSMENT/PLAN:  Ila Pierre is a 63 year old female with:    1) Right-sided breast cancer, upper outer quadrant: On 12/14/18, she underwent right mastectomy and right axillary sentinel lymph node biopsy, with reconstruction.  Pathology showed infiltrating ductal carcinoma, grade 1, forming multiple foci up to 3 mm, DCIS intermediate nuclear grade, forming multiple foci.  There was intraductal papilloma with atypia identified.  ER positive (98%), UT positive (98%), HER-2 negative, margins negative, 0 of 2 lymph nodes involved, stage [id number] (stage IA).  Oncotype DX score is 10.    She started anastrozole around 1/15/19.  She is getting more hot flashes and myalgias/arthralgias.  She tried exemestane and letrozole, and both felt worse than when she was taking anastrozole.  She feels she may be getting depressed too. She took a break from hormonal medications in early August 2019.  She is feeling better now, and would like to try tamoxifen.       -teaching on tamoxifen today  -Side effects may include, but not limited to, mood changes, hot flashes, night sweats, fatigue, nausea, decreased sex drive, headache, and small risk of blood clots and uterine cancer.  -I discussed the schedule, regimen, dose, possible side effects, the benefits and risks, and patient agrees to treatment plan.  -tamoxifen prescribed to her pharmacy  -I offered Effexor.  She says that she will consider it, but wanted to  wait for now and see how she feels on the tamoxifen.    -plan for at least 5 years or hormonal treatment if can tolerate  -annual left-sided mammograms - next one will be in November 2019 - ordered  -she is open to physical therapy for her myalgias/arthralgias - referral placed    Of note, patient may move to [place] around November/December 2019.  She will let us know her plans at the next visit.    2) Bone health: DEXA scan on 1/14/19 was normal  -next DEXA scan will be in January 2021  -she will take calcium and vitamin D, do weight bearing exercises      I spent a total of 25 minutes with the patient, with over >50% of the time in counseling and/or coordination of care.       Belkis Molina MD  Hematology/Oncology  [facility]      EDUCATION  Discussed with pt and family information regarding tamoxefen. Went over side affects of medications, as self care while on chemotherapy.   Informed patient and family when he should call the triage nurse at clinic or seek medical attention if you have chills and/or temperature greater than or equal to 100.5, uncontrolled nausea/vomiting, diarrhea, constipation, dizziness, shortness of breath, chest pain, heart palpitations, weakness or any other new or concerning symptoms, questions or concerns.  You can not have any live virus vaccines prior to or during treatment or up to 6 months post infusion.  If you have an upcoming surgery, medical procedure or dental procedure during treatment, this should be discussed with your ordering physician and your surgeon/dentist.  If you are having any concerning symptom, if you are unsure if you should get your next infusion or wish to speak to a provider before your next infusion, please call your care coordinator or triage nurse at your clinic to notify them so we can adequately serve you.         Again, thank you for allowing me to participate in the care of your patient.        Sincerely,        Belkis Fitzgerald  MD Jesse

## 2019-09-17 NOTE — PROGRESS NOTES
[facility]    Hematology/Oncology Established Patient Note      Today's Date: 09/17/19    Reason for Follow-up: right breast cancer      HISTORY OF PRESENT ILLNESS: Ila Pierre is a 63 year old female with PMHx of HTN, COPD who presents with right-sided breast cancer.   She undergone a screening mammogram, which found a new right breast mass.  On 12/14/18, she underwent right mastectomy and right axillary sentinel lymph node biopsy by Dr. Peraels, with reconstruction by Dr. Rees.  Pathology showed infiltrating ductal carcinoma, grade 1, forming multiple foci up to 3 mm, DCIS intermediate nuclear grade, forming multiple foci.  There was intraductal papilloma with atypia identified.  ER positive (98%), ND positive (98%), HER-2 negative, margins negative, 0 of 2 lymph nodes involved, stage [id number] (stage IA). Oncotype DX score is 10.    She started anastrozole around 1/15/19.  Due hot flashes and bone pain, she switched to exemestane, but she felt worse on it.  She then tried letrozole, but, again, felt worse on that as well.  She took a break from hormonal medications on 8/2/19.        INTERIM HISTORY: Winnie is here for follow-up today.  She has been off of hormonal medication since last month.   She is feeling a bit better.  She wants to try again, this time with tamoxifen and see how she feels.  She still has some myalgias, but is better now.        REVIEW OF SYSTEMS:   14 point ROS was reviewed and is negative other than as noted above in HPI.       HOME MEDICATIONS:  Current Outpatient Medications   Medication Sig Dispense Refill     aspirin 81 MG tablet Take 81 mg by mouth daily        atenolol (TENORMIN) 50 MG tablet TAKE 1 TABLET(50 MG) BY MOUTH DAILY (Patient taking differently: Take 50 mg by mouth every evening Takes at 18:00) 90 tablet 3     Calcium Carb-Cholecalciferol (CALCIUM 1000 + D) 1000-800 MG-UNIT TABS Take by mouth daily       Cholecalciferol (VITAMIN D3 PO) Take  5,000 Units by mouth every evening Takes at 18:00       l-tyrosine 500 MG TABS Take 2 tablets by mouth daily.       Misc Natural Products (GLUCOSAMINE CHOND MSM FORMULA PO) Take 1 tablet by mouth every evening (Glucosamine 500 mg Chondroitin 400 mg  mg) takes at 18:00       Multiple Vitamins-Minerals (CENTRUM SILVER) per tablet Take 1 tablet by mouth every evening        Omega-3 Fatty Acids (FISH OIL PO) Take 600 mg by mouth every evening Takes at 18:00       order for DME Equipment being ordered: post mastectomy garment.     Scheduled for right mastectomy, right SLNB, possible axillary node dissection, tissue expander on 12/14/18. 2 Units 0     Probiotic Product (PROBIOTIC DAILY PO) Take 1 capsule by mouth every evening Acidophilus with goats milk 343 mg- 10 mg.  Takes at 18:00       TAMOXIFEN CITRATE PO            ALLERGIES:  Allergies   Allergen Reactions     Penicillins      As a child     Codeine Sulfate Diarrhea         PAST MEDICAL HISTORY:  Past Medical History:   Diagnosis Date     Breast mass      Cancer (H)     right breast     COPD (chronic obstructive pulmonary disease) (H)      Herpes labialis      Hypertension goal BP (blood pressure) < 140/90      Menopausal symptoms      Palpitations      PONV (postoperative nausea and vomiting)          PAST SURGICAL HISTORY:  Past Surgical History:   Procedure Laterality Date     COLONOSCOPY  8-21-08     DISSECT LYMPH NODE AXILLA Right 12/14/2018    Procedure: AXILLARY NODE DISECTION ( COLT );  Surgeon: Adolfo Perales MD;  Location:  OR      TOOTH EXTRACTION W/FORCEP  1973    wisdom teeth     HYSTERECTOMY  2005    for cyst and endometiosis     HYSTERECTOMY, PAP NO LONGER INDICATED       INSERT TISSUE EXPANDER BREAST Bilateral 12/14/2018    Procedure: INSERT RIGHT  TISSUE EXPANDER BREAST ( ARIELECK );  Surgeon: Mio Rodríguez MD;  Location:  OR     LEG SURGERY  1975    lump removed     MASTECTOMY SIMPLE, SENTINEL NODE, COMBINED Right  2018    Procedure: RIGHT MASTECTOMY WITH RIGHT SENTINEL NODE BIOPSY (  COLT );  Surgeon: Adolfo Perales MD;  Location: [facility] OR     TONSILLECTOMY  1964     TUBAL LIGATION           SOCIAL HISTORY:  Social History     Socioeconomic History     Marital status: Single     Spouse name: Not on file     Number of children: Not on file     Years of education: Not on file     Highest education level: Not on file   Occupational History     Not on file   Social Needs     Financial resource strain: Not on file     Food insecurity:     Worry: Not on file     Inability: Not on file     Transportation needs:     Medical: Not on file     Non-medical: Not on file   Tobacco Use     Smoking status: Former Smoker     Last attempt to quit: 10/19/1997     Years since quittin.9     Smokeless tobacco: Never Used   Substance and Sexual Activity     Alcohol use: Yes     Comment: 3 beer - 3 times a week     Drug use: No     Sexual activity: Yes     Partners: Male     Birth control/protection: Surgical     Comment: tubal ligation -  no children   Lifestyle     Physical activity:     Days per week: Not on file     Minutes per session: Not on file     Stress: Not on file   Relationships     Social connections:     Talks on phone: Not on file     Gets together: Not on file     Attends [employer] service: Not on file     Active member of club or organization: Not on file     Attends meetings of clubs or organizations: Not on file     Relationship status: Not on file     Intimate partner violence:     Fear of current or ex partner: Not on file     Emotionally abused: Not on file     Physically abused: Not on file     Forced sexual activity: Not on file   Other Topics Concern     Parent/sibling w/ CABG, MI or angioplasty before 65F 55M? No   Social History Narrative     Not on file     Winnie does not have any children.  She has had a total hysterectomy and bilateral oophorectomy and took estrogen patch for about 10 years up until her  "breast cancer diagnosis.  She quit smoking many years ago.  She drinks alcohol 1-2 drinks a week on weekends.  She lives with her significant other in [place].          FAMILY HISTORY:  Family History   Problem Relation Age of Onset     Osteoporosis Mother      Breast Cancer Mother      Prostate Cancer Father      Lipids Brother      Breast Cancer Sister    Her mother had breast cancer and has lumpectomy at age 90.  Her sister has bilateral mastectomy at age 60 for breast cancer as well.      PHYSICAL EXAM:  Vital signs:  BP (!) 150/81 (BP Location: Left arm, Patient Position: Sitting, Cuff Size: Adult Large)   Pulse 76   Temp 98.5  F (36.9  C) (Oral)   Resp 18   Ht 1.74 m (5' 8.5\")   Wt 102.9 kg (226 lb 12.8 oz)   SpO2 99%   BMI 33.98 kg/m  ECO  GENERAL/CONSTITUTIONAL: No acute distress.   EYES: No scleral icterus.  LNEUROLOGIC: Alert, oriented, answers questions appropriately.  INTEGUMENTARY: No jaundice.  GAIT: Steady, does not use assistive device      LABS:  None today.      IMAGING:  Bilateral mammogram 10/31/18:  BREAST DENSITY: Heterogeneously dense.     COMMENTS: Possible developing architectural distortion in the RIGHT  breast at approximately 8:00-10:00 position, 6 to 7 cm from the  nipple. Also, there is an enlarged RIGHT axillary lymph node. No  concerning findings in the left breast.                                                                       IMPRESSION: BI-RADS CATEGORY: 0 - Need Additional Imaging Evaluation  and/or Prior Mammograms for Comparison.    Right breast ultrasound 18:  FINDINGS:  Targeted ultrasound was performed. At the 10:00 position of  the right breast, 7 cm from the nipple, there is an ill-defined area  of hypoechoic tissue which measures approximately 2.8 cm in maximum  dimension.     Survey of the right axilla shows a enlarged, partially  cystic-appearing mass measuring 1.9 x 1.0 x 1.1 cm.                                                                  "      IMPRESSION: BI-RADS CATEGORY: 4 - Suspicious Abnormality-Biopsy Should  Be Considered.  Ill-defined hypoechoic tissue at the 10:00 position of the right  breast and a partially cystic right axillary mass. Ultrasound-guided  core biopsy x2 is recommended. Results and recommendation were  discussed with the patient during her appointment.    MRI breast 11/18/18:  1. Segmental distribution of clumped non-mass enhancement encompassing  nearly the entire upper outer quadrant of the RIGHT breast measures up  to 14.4 cm in maximal dimension and comes to within 1.5 cm of the  nipple. Given the biopsy results, this is concerning for DCIS. If it  would be of clinical use, additional biopsy could be performed more  anteriorly in the breast to more definitively establish the extent of  disease prior to surgery. If this is desired, recommend additional  evaluation with ultrasound and possible biopsy, but if no target is  found, MRI guided biopsy could be performed.  2. No concerning areas of enhancement in the LEFT breast.  3. No lymphadenopathy.    DEXA 1/14/19:  Normal lumbar spine and bilateral hip bone density.        ASSESSMENT/PLAN:  Ila Pierre is a 63 year old female with:    1) Right-sided breast cancer, upper outer quadrant: On 12/14/18, she underwent right mastectomy and right axillary sentinel lymph node biopsy, with reconstruction.  Pathology showed infiltrating ductal carcinoma, grade 1, forming multiple foci up to 3 mm, DCIS intermediate nuclear grade, forming multiple foci.  There was intraductal papilloma with atypia identified.  ER positive (98%), MO positive (98%), HER-2 negative, margins negative, 0 of 2 lymph nodes involved, stage [id number] (stage IA).  Oncotype DX score is 10.    She started anastrozole around 1/15/19.  She is getting more hot flashes and myalgias/arthralgias.  She tried exemestane and letrozole, and both felt worse than when she was taking anastrozole.  She feels she may be  getting depressed too. She took a break from hormonal medications in early August 2019.  She is feeling better now, and would like to try tamoxifen.       -teaching on tamoxifen today  -Side effects may include, but not limited to, mood changes, hot flashes, night sweats, fatigue, nausea, decreased sex drive, headache, and small risk of blood clots and uterine cancer.  -I discussed the schedule, regimen, dose, possible side effects, the benefits and risks, and patient agrees to treatment plan.  -tamoxifen prescribed to her pharmacy  -I offered Effexor.  She says that she will consider it, but wanted to wait for now and see how she feels on the tamoxifen.    -plan for at least 5 years or hormonal treatment if can tolerate  -annual left-sided mammograms - next one will be in November 2019 - ordered  -she is open to physical therapy for her myalgias/arthralgias - referral placed    Of note, patient may move to [place] around November/December 2019.  She will let us know her plans at the next visit.    2) Bone health: DEXA scan on 1/14/19 was normal  -next DEXA scan will be in January 2021  -she will take calcium and vitamin D, do weight bearing exercises      I spent a total of 25 minutes with the patient, with over >50% of the time in counseling and/or coordination of care.       Belkis Molina MD  Hematology/Oncology  [facility]

## 2019-09-17 NOTE — PROGRESS NOTES
"   Oncology Rooming Note    September 17, 2019 1:27 PM   Ila Pierre is a 63 year old female who presents for:    Chief Complaint   Patient presents with     Oncology Clinic Visit     Breast cancer (H)     Initial Vitals: BP (!) 150/81 (BP Location: Left arm, Patient Position: Sitting, Cuff Size: Adult Large)   Pulse 76   Temp 98.5  F (36.9  C) (Oral)   Resp 18   Ht 1.74 m (5' 8.5\")   Wt 102.9 kg (226 lb 12.8 oz)   SpO2 99%   BMI 33.98 kg/m   Estimated body mass index is 33.98 kg/m  as calculated from the following:    Height as of this encounter: 1.74 m (5' 8.5\").    Weight as of this encounter: 102.9 kg (226 lb 12.8 oz). Body surface area is 2.23 meters squared.  Severe Pain (6) Comment: Data Unavailable   No LMP recorded. Patient has had a hysterectomy.  Allergies reviewed: Yes  Medications reviewed: Yes    Medications: MEDICATION REFILLS NEEDED TODAY. Provider was notified. Refill MASTECTOMY GARMENT  Pharmacy name entered into [facility]: [id number] [place] [place] [facility]    Clinical concerns: no        Christelle Grimes MA            "

## 2019-09-17 NOTE — PROGRESS NOTES
EDUCATION  Discussed with pt and family information regarding tamoxefen. Went over side affects of medications, as self care while on chemotherapy.   Informed patient and family when he should call the triage nurse at clinic or seek medical attention if you have chills and/or temperature greater than or equal to 100.5, uncontrolled nausea/vomiting, diarrhea, constipation, dizziness, shortness of breath, chest pain, heart palpitations, weakness or any other new or concerning symptoms, questions or concerns.  You can not have any live virus vaccines prior to or during treatment or up to 6 months post infusion.  If you have an upcoming surgery, medical procedure or dental procedure during treatment, this should be discussed with your ordering physician and your surgeon/dentist.  If you are having any concerning symptom, if you are unsure if you should get your next infusion or wish to speak to a provider before your next infusion, please call your care coordinator or triage nurse at your clinic to notify them so we can adequately serve you.

## 2019-09-18 ENCOUNTER — TELEPHONE (OUTPATIENT)
Dept: ONCOLOGY | Facility: CLINIC | Age: 64
End: 2019-09-18

## 2019-09-18 NOTE — TELEPHONE ENCOUNTER
Oncology Distress Screening Follow-up  Clinical Social Work  [facility]    Identified Concern and Score From Distress Screenin. How concerned are you about your ability to eat?   5           2. How concerned are you about unintended weight loss or your current weight?   1           3. How concerned are you about feeling depressed or very sad?   8Abnormal            4. How concerned are you about feeling anxious or very scared?   8Abnormal            5. Do you struggle with the loss of meaning and hector in your life?       Not at all           6. How concerned are you about work and home life issues that may be affected by your cancer?   5           7. How concerned are you about knowing what resources are available to help you?   0           8. Do you currently have what you would describe as Sabianism or spiritual struggles?              Not at all             Date of Distress Screenin19    Intervention:   Winnie is a 63-year-old woman with a diagnosis of breast cancer s/p mastectomy, who is planning to restart hormonal therapy after 1 month break. Winnie came to clinic 19 for follow-up with , at which point she scored positive on distress screen. This clinician reached out to [facility] today with goal of following up regarding positive distress screen, leaving VM with social work availability and contact information.     Follow-up Required:   This clinician will await return call from [facility]. No further  outreach needed at present time.     Please page in the case of psychosocial distress or need.     OT[place], [facility]  Phone: [phone]  Pager: [phone]    [facility]: M, Thu  *every other Tue, 8am-4:30pm  [facility]: W, F, *every other Tue, 8am-4:30pm

## 2019-09-23 ENCOUNTER — TELEPHONE (OUTPATIENT)
Dept: ONCOLOGY | Facility: CLINIC | Age: 64
End: 2019-09-23

## 2019-09-23 NOTE — TELEPHONE ENCOUNTER
Social Work Progress Note      Data/Intervention:  Patient Name:  Ila Pierre  /Age:  1955 (63 year old)    Reason for Follow-Up:  Winnie is a 63-year-old woman with a diagnosis of breast cancer s/p mastectomy, who restarted oral therapy last week. Winnie returning this clinician's call.     Intervention:   Winnie reports today that she retired in  and has been managing care of her aging mother. Winnie reports today that much of her emotional distress has been managing her feelings in relation to others making unhelpful comments about her cancer experience, or wanting to spend longer time processing all that she has gone through. Provided safe place for Winnie to voice how she has been grappling with physical, emotional, and relational aspects of mastectomy, as well as her sources of resilience and strength. Winnie identifies partner Alexandro as being a wonderful source of support, and her sense of humor as being helpful to her coping. Winnie looking forward to having nipple tattoo next month. Provided emotional support surrounding multiple changes, validating that it is okay to not process with every friend about impacts of cancer, provided recommendations for stock phrases to redirect conversation, and provided resources for additional support if needed.     Resources Provided:  Onc [facility] [employer]: stand-up comedy support group  [employer]    Plan:  Provided patient/family with writer's contact information and availability. TERRANCE will plan for in-person psychosocial check-in 10/22/19 per Winnie's request. Winnie knows to reach out prior in the case of psychosocial distress or need.     Please call or page if needs or concerns arise.     OTIS [place], [facility]  Direct Phone: [phone]  Pager: [phone]

## 2019-10-06 DIAGNOSIS — I10 ESSENTIAL HYPERTENSION WITH GOAL BLOOD PRESSURE LESS THAN 140/90: ICD-10-CM

## 2019-10-07 RX ORDER — ATENOLOL 50 MG/1
TABLET ORAL
Qty: 90 TABLET | Refills: 3 | Status: SHIPPED | OUTPATIENT
Start: 2019-10-07 | End: 2020-10-05

## 2019-10-07 NOTE — TELEPHONE ENCOUNTER
Routing refill request to provider for review/approval because:  Labs out of range:  BP    SIMA OgN, RN  [employer] [facility]

## 2019-10-07 NOTE — TELEPHONE ENCOUNTER
"Requested Prescriptions   Pending Prescriptions Disp Refills     atenolol (TENORMIN) 50 MG tablet [Pharmacy Med Name: ATENOLOL 50 MG TABLET]  Last Written Prescription Date:  7/26/2018  Last Fill Quantity: 90 tablet,  # refills: 3   Last office visit: 8/27/2019 with prescribing provider:  [facility]hat   Future Office Visit:   Next 5 appointments (look out 90 days)    Oct 22, 2019 11:00 AM CDT  Return Visit with Belkis Molina MD  [facility]  [address] [facility] [address]  [facility] [address]  [phone]          90 tablet 3     Sig: TAKE 1 TABLET(50 MG) BY MOUTH DAILY       Beta-Blockers Protocol Failed - 10/6/2019 12:24 AM        Failed - Blood pressure under 140/90 in past 12 months     BP Readings from Last 3 Encounters:   09/17/19 (!) 150/81   08/27/19 104/76   08/02/19 136/82                 Passed - Patient is age 6 or older        Passed - Recent (12 mo) or future (30 days) visit within the authorizing provider's specialty     Patient has had an office visit with the authorizing provider or a provider within the authorizing providers department within the previous 12 mos or has a future within next 30 days. See \"Patient Info\" tab in inbasket, or \"Choose Columns\" in Meds & Orders section of the refill encounter.              Passed - Medication is active on med list           "

## 2019-10-17 ENCOUNTER — HOSPITAL ENCOUNTER (OUTPATIENT)
Dept: PHYSICAL THERAPY | Facility: CLINIC | Age: 64
Setting detail: THERAPIES SERIES
End: 2019-10-17
Attending: INTERNAL MEDICINE
Payer: COMMERCIAL

## 2019-10-17 DIAGNOSIS — C50.911 MALIGNANT NEOPLASM OF RIGHT BREAST IN FEMALE, ESTROGEN RECEPTOR POSITIVE, UNSPECIFIED SITE OF BREAST (H): ICD-10-CM

## 2019-10-17 DIAGNOSIS — Z17.0 MALIGNANT NEOPLASM OF RIGHT BREAST IN FEMALE, ESTROGEN RECEPTOR POSITIVE, UNSPECIFIED SITE OF BREAST (H): ICD-10-CM

## 2019-10-17 PROCEDURE — 97161 PT EVAL LOW COMPLEX 20 MIN: CPT | Mod: GP | Performed by: PHYSICAL THERAPIST

## 2019-10-17 PROCEDURE — 97140 MANUAL THERAPY 1/> REGIONS: CPT | Mod: GP | Performed by: PHYSICAL THERAPIST

## 2019-10-17 PROCEDURE — 97110 THERAPEUTIC EXERCISES: CPT | Mod: GP | Performed by: PHYSICAL THERAPIST

## 2019-10-22 ENCOUNTER — ONCOLOGY VISIT (OUTPATIENT)
Dept: ONCOLOGY | Facility: CLINIC | Age: 64
End: 2019-10-22
Attending: INTERNAL MEDICINE
Payer: COMMERCIAL

## 2019-10-22 ENCOUNTER — ALLIED HEALTH/NURSE VISIT (OUTPATIENT)
Dept: ONCOLOGY | Facility: CLINIC | Age: 64
End: 2019-10-22

## 2019-10-22 VITALS
WEIGHT: 225.6 LBS | RESPIRATION RATE: 14 BRPM | HEIGHT: 69 IN | DIASTOLIC BLOOD PRESSURE: 84 MMHG | SYSTOLIC BLOOD PRESSURE: 140 MMHG | BODY MASS INDEX: 33.41 KG/M2 | TEMPERATURE: 97.8 F | OXYGEN SATURATION: 99 % | HEART RATE: 59 BPM

## 2019-10-22 DIAGNOSIS — Z71.9 COUNSELING, UNSPECIFIED: Primary | ICD-10-CM

## 2019-10-22 DIAGNOSIS — Z17.0 MALIGNANT NEOPLASM OF RIGHT BREAST IN FEMALE, ESTROGEN RECEPTOR POSITIVE, UNSPECIFIED SITE OF BREAST (H): ICD-10-CM

## 2019-10-22 DIAGNOSIS — C50.911 MALIGNANT NEOPLASM OF RIGHT BREAST IN FEMALE, ESTROGEN RECEPTOR POSITIVE, UNSPECIFIED SITE OF BREAST (H): ICD-10-CM

## 2019-10-22 PROCEDURE — 99214 OFFICE O/P EST MOD 30 MIN: CPT | Performed by: INTERNAL MEDICINE

## 2019-10-22 PROCEDURE — G0463 HOSPITAL OUTPT CLINIC VISIT: HCPCS

## 2019-10-22 RX ORDER — TAMOXIFEN CITRATE 10 MG/1
10 TABLET ORAL DAILY
Qty: 90 TABLET | Refills: 3 | Status: SHIPPED | OUTPATIENT
Start: 2019-10-22 | End: 2021-09-14

## 2019-10-22 ASSESSMENT — PAIN SCALES - GENERAL: PAINLEVEL: MODERATE PAIN (5)

## 2019-10-22 ASSESSMENT — MIFFLIN-ST. JEOR: SCORE: 1639.75

## 2019-10-22 NOTE — PROGRESS NOTES
"Oncology Rooming Note    October 22, 2019 10:47 AM   Ila Pierre is a 63 year old female who presents for:    Chief Complaint   Patient presents with     Oncology Clinic Visit     Initial Vitals: BP (!) 140/84   Pulse 59   Temp 97.8  F (36.6  C) (Oral)   Resp 14   Ht 1.74 m (5' 8.5\")   Wt 102.3 kg (225 lb 9.6 oz)   SpO2 99%   BMI 33.80 kg/m   Estimated body mass index is 33.8 kg/m  as calculated from the following:    Height as of this encounter: 1.74 m (5' 8.5\").    Weight as of this encounter: 102.3 kg (225 lb 9.6 oz). Body surface area is 2.22 meters squared.  Moderate Pain (5) Comment: Data Unavailable   No LMP recorded. Patient has had a hysterectomy.  Allergies reviewed: Yes  Medications reviewed: Yes    Medications: MEDICATION REFILLS NEEDED TODAY. Provider was notified.  Tamoxifen   Pharmacy name entered into [employer]: [facility] [id number] [place] [address] [address]    Clinical concerns: Sleep problems and hot flashes Dr. Molina was notified.      Shari J. Schoenberger, [facility]            "

## 2019-10-22 NOTE — PROGRESS NOTES
[facility]    Hematology/Oncology Established Patient Note      Today's Date: 10/22/19    Reason for Follow-up: right breast cancer      HISTORY OF PRESENT ILLNESS: Ila Pierre is a 63 year old female with PMHx of HTN, COPD who presents with right-sided breast cancer.   She undergone a screening mammogram, which found a new right breast mass.  On 12/14/18, she underwent right mastectomy and right axillary sentinel lymph node biopsy by Dr. Perales, with reconstruction by Dr. Rees.  Pathology showed infiltrating ductal carcinoma, grade 1, forming multiple foci up to 3 mm, DCIS intermediate nuclear grade, forming multiple foci.  There was intraductal papilloma with atypia identified.  ER positive (98%), WA positive (98%), HER-2 negative, margins negative, 0 of 2 lymph nodes involved, stage [id number] (stage IA). Oncotype DX score is 10.    She started anastrozole around 1/15/19.  Due hot flashes and bone pain, she switched to exemestane, but she felt worse on it.  She then tried letrozole, but, again, felt worse on that as well.  She took a break from hormonal medications on 8/2/19.      She started tamoxifen on 9/17/19.      INTERIM HISTORY: Winnie is here for follow-up today.  She started tamoxifen and has been having hot flashes and trouble sleeping.  She says that she is going to try tumeric and medical cannabis.      REVIEW OF SYSTEMS:   14 point ROS was reviewed and is negative other than as noted above in HPI.       HOME MEDICATIONS:  Current Outpatient Medications   Medication Sig Dispense Refill     aspirin 81 MG tablet Take 81 mg by mouth daily        atenolol (TENORMIN) 50 MG tablet TAKE 1 TABLET(50 MG) BY MOUTH DAILY 90 tablet 3     Calcium Carb-Cholecalciferol (CALCIUM 1000 + D) 1000-800 MG-UNIT TABS Take by mouth daily       Cholecalciferol (VITAMIN D3 PO) Take 5,000 Units by mouth every evening Takes at 18:00       l-tyrosine 500 MG TABS Take 2 tablets by mouth daily.        Misc Natural Products (GLUCOSAMINE CHOND MSM FORMULA PO) Take 1 tablet by mouth every evening (Glucosamine 500 mg Chondroitin 400 mg  mg) takes at 18:00       Multiple Vitamins-Minerals (CENTRUM SILVER) per tablet Take 1 tablet by mouth every evening        Omega-3 Fatty Acids (FISH OIL PO) Take 600 mg by mouth every evening Takes at 18:00       order for DME Equipment being ordered: post mastectomy garment.     Scheduled for right mastectomy, right SLNB, possible axillary node dissection, tissue expander on 12/14/18. 2 Units 0     Probiotic Product (PROBIOTIC DAILY PO) Take 1 capsule by mouth every evening Acidophilus with goats milk 343 mg- 10 mg.  Takes at 18:00       tamoxifen (NOLVADEX) 20 MG tablet Take 1 tablet (20 mg) by mouth daily 90 tablet 3         ALLERGIES:  Allergies   Allergen Reactions     Penicillins      As a child     Codeine Sulfate Diarrhea         PAST MEDICAL HISTORY:  Past Medical History:   Diagnosis Date     Breast mass      Cancer (H)     right breast     COPD (chronic obstructive pulmonary disease) (H)      Herpes labialis      Hypertension goal BP (blood pressure) < 140/90      Menopausal symptoms      Palpitations      PONV (postoperative nausea and vomiting)          PAST SURGICAL HISTORY:  Past Surgical History:   Procedure Laterality Date     COLONOSCOPY  8-21-08     DISSECT LYMPH NODE AXILLA Right 12/14/2018    Procedure: AXILLARY NODE DISECTION ( COLT );  Surgeon: Adolfo Perales MD;  Location:  OR      TOOTH EXTRACTION W/FORCEP  1973    wisdom teeth     HYSTERECTOMY  2005    for cyst and endometiosis     HYSTERECTOMY, PAP NO LONGER INDICATED       INSERT TISSUE EXPANDER BREAST Bilateral 12/14/2018    Procedure: INSERT RIGHT  TISSUE EXPANDER BREAST ( ARIELECK );  Surgeon: Mio Rodríguez MD;  Location:  OR     LEG SURGERY  1975    lump removed     MASTECTOMY SIMPLE, SENTINEL NODE, COMBINED Right 12/14/2018    Procedure: RIGHT MASTECTOMY WITH RIGHT SENTINEL  NODE BIOPSY (  COLT );  Surgeon: Adolfo Perales MD;  Location: [facility] OR     TONSILLECTOMY  1964     TUBAL LIGATION           SOCIAL HISTORY:  Social History     Socioeconomic History     Marital status: Single     Spouse name: Not on file     Number of children: Not on file     Years of education: Not on file     Highest education level: Not on file   Occupational History     Not on file   Social Needs     Financial resource strain: Not on file     Food insecurity:     Worry: Not on file     Inability: Not on file     Transportation needs:     Medical: Not on file     Non-medical: Not on file   Tobacco Use     Smoking status: Former Smoker     Last attempt to quit: 10/19/1997     Years since quittin.0     Smokeless tobacco: Never Used   Substance and Sexual Activity     Alcohol use: Yes     Comment: 3 beer - 3 times a week     Drug use: No     Sexual activity: Yes     Partners: Male     Birth control/protection: Surgical     Comment: tubal ligation -  no children   Lifestyle     Physical activity:     Days per week: Not on file     Minutes per session: Not on file     Stress: Not on file   Relationships     Social connections:     Talks on phone: Not on file     Gets together: Not on file     Attends [employer] service: Not on file     Active member of club or organization: Not on file     Attends meetings of clubs or organizations: Not on file     Relationship status: Not on file     Intimate partner violence:     Fear of current or ex partner: Not on file     Emotionally abused: Not on file     Physically abused: Not on file     Forced sexual activity: Not on file   Other Topics Concern     Parent/sibling w/ CABG, MI or angioplasty before 65F 55M? No   Social History Narrative     Not on file     Winnie does not have any children.  She has had a total hysterectomy and bilateral oophorectomy and took estrogen patch for about 10 years up until her breast cancer diagnosis.  She quit smoking many years ago.   "She drinks alcohol 1-2 drinks a week on weekends.  She lives with her significant other in [place].          FAMILY HISTORY:  Family History   Problem Relation Age of Onset     Osteoporosis Mother      Breast Cancer Mother      Prostate Cancer Father      Lipids Brother      Breast Cancer Sister    Her mother had breast cancer and has lumpectomy at age 90.  Her sister has bilateral mastectomy at age 60 for breast cancer as well.      PHYSICAL EXAM:  Vital signs:  BP (!) 140/84   Pulse 59   Temp 97.8  F (36.6  C) (Oral)   Resp 14   Ht 1.74 m (5' 8.5\")   Wt 102.3 kg (225 lb 9.6 oz)   SpO2 99%   BMI 33.80 kg/m  ECO  GENERAL/CONSTITUTIONAL: No acute distress.   EYES: No scleral icterus.  LNEUROLOGIC: Alert, oriented, answers questions appropriately.  INTEGUMENTARY: No jaundice.  GAIT: Steady, does not use assistive device      LABS:  None today.      IMAGING:  Bilateral mammogram 10/31/18:  BREAST DENSITY: Heterogeneously dense.     COMMENTS: Possible developing architectural distortion in the RIGHT  breast at approximately 8:00-10:00 position, 6 to 7 cm from the  nipple. Also, there is an enlarged RIGHT axillary lymph node. No  concerning findings in the left breast.                                                                       IMPRESSION: BI-RADS CATEGORY: 0 - Need Additional Imaging Evaluation  and/or Prior Mammograms for Comparison.    Right breast ultrasound 18:  FINDINGS:  Targeted ultrasound was performed. At the 10:00 position of  the right breast, 7 cm from the nipple, there is an ill-defined area  of hypoechoic tissue which measures approximately 2.8 cm in maximum  dimension.     Survey of the right axilla shows a enlarged, partially  cystic-appearing mass measuring 1.9 x 1.0 x 1.1 cm.                                                                       IMPRESSION: BI-RADS CATEGORY: 4 - Suspicious Abnormality-Biopsy Should  Be Considered.  Ill-defined hypoechoic tissue at the 10:00 " position of the right  breast and a partially cystic right axillary mass. Ultrasound-guided  core biopsy x2 is recommended. Results and recommendation were  discussed with the patient during her appointment.    MRI breast 11/18/18:  1. Segmental distribution of clumped non-mass enhancement encompassing  nearly the entire upper outer quadrant of the RIGHT breast measures up  to 14.4 cm in maximal dimension and comes to within 1.5 cm of the  nipple. Given the biopsy results, this is concerning for DCIS. If it  would be of clinical use, additional biopsy could be performed more  anteriorly in the breast to more definitively establish the extent of  disease prior to surgery. If this is desired, recommend additional  evaluation with ultrasound and possible biopsy, but if no target is  found, MRI guided biopsy could be performed.  2. No concerning areas of enhancement in the LEFT breast.  3. No lymphadenopathy.    DEXA 1/14/19:  Normal lumbar spine and bilateral hip bone density.        ASSESSMENT/PLAN:  Ila Pierre is a 63 year old female with:    1) Right-sided breast cancer, upper outer quadrant: On 12/14/18, she underwent right mastectomy and right axillary sentinel lymph node biopsy, with reconstruction.  Pathology showed infiltrating ductal carcinoma, grade 1, forming multiple foci up to 3 mm, DCIS intermediate nuclear grade, forming multiple foci.  There was intraductal papilloma with atypia identified.  ER positive (98%), SC positive (98%), HER-2 negative, margins negative, 0 of 2 lymph nodes involved, stage [id number] (stage IA).  Oncotype DX score is 10.    She started anastrozole around 1/15/19.  She wasgetting more hot flashes and myalgias/arthralgias.  She tried exemestane and letrozole, and both felt worse than when she was taking anastrozole.  She feels she may be getting depressed too. She took a break from hormonal medications in early August 2019.  She felt better after the break, and started  tamoxifen on 9/17/19.    She has hot flashes and trouble sleeping on the tamoxifen.  We discuss options, including reducing tamoxifen dose to 10 mg daily, adding Effexor, or stopping hormonal therapy.  She would like to try the tamoxifen 10 mg daily.  If that doesn't help, then she is open to adding Effexor to try to help with her symptoms.      -will reduce dose of tamoxifen to 10 mg daily  -she wants to wait on trying Effexor for now.  If she still gets symptoms on the tamoxifen, then she may be willing to try Effexor.  -plan for at least 5 years or hormonal treatment if can tolerate  -annual left-sided mammograms - next one will be in November 2019 - ordered and scheduled.  -she feels that the physical therapy was tremendously helpful for her myalgias/arthralgias  -RTC in 2 months with Jose F for toxicity check  -RTC in 4 months with me    2) Bone health: DEXA scan on 1/14/19 was normal  -next DEXA scan will be in January 2021  -she will take calcium and vitamin D, do weight bearing exercises      I spent a total of 25 minutes with the patient, with over >50% of the time in counseling and/or coordination of care.       Belkis Molina MD  Hematology/Oncology  [facility]

## 2019-10-22 NOTE — PROGRESS NOTES
"Oncology Distress Screening Follow-up  Clinical Social Work  [facility]    Identified Concern and Score From Distress Screenin. How concerned are you about your ability to eat?   0           2. How concerned are you about unintended weight loss or your current weight?   2           3. How concerned are you about feeling depressed or very sad?   4           4. How concerned are you about feeling anxious or very scared?   4           5. Do you struggle with the loss of meaning and hector in your life?       Somewhat           6. How concerned are you about work and home life issues that may be affected by your cancer?   0           7. How concerned are you about knowing what resources are available to help you?   0           8. Do you currently have what you would describe as Sikhism or spiritual struggles?              Not at all           You can also ask to be contacted by one of our Oncology Supportive Care professionals.    9. If you want to be contacted by one of our professionals, I can send a message to them right now.   Oncology Social  Worker             Date of Distress Screening: 10/22/19    Intervention:   Winnie is a 63-year-old woman with a diagnosis of right breast cancer who comes to clinic today for follow-up with Dr. Molina. Winnie reports today that she continues to engage with Dr. Molina in discussion about whether tamoxifen continues to be right treatment for her. Provided safe place for Winnie to voice distress surrounding side effects of medication and impact this has on daily life. Discussion centered around impact that treatment continues to have on social interactions, and friends that she has found to connect with more who are also breast cancer survivors. Some discussion today about understandable ambivalence in identifying with survivor community, that there is a part of her that \"wants life back as it was.\" Discussed community resources for complementary approaches as acupuncture has been " helpful for her in the past and she is looking forward to see if it has any benefit for hot flashes.     Education Provided:   Acupuncture Resources in Clinic  [facility]    Follow-up Required:   1) Winnie has been given social work contact information and knows to reach out in the case of psychosocial distress or need. No further social work outreach needed at present time.   2) Ongoing collaboration with multidisciplinary care team.     Please page in the case of psychosocial distress or need.     [place], [facility]  Phone: [phone]  Pager: [phone]    Madison Baldo: M, Thu  *every other Tue, 8am-4:30pm  Liz Fowler: W, F, *every other Tue, 8am-4:30pm

## 2019-10-30 ENCOUNTER — HOSPITAL ENCOUNTER (OUTPATIENT)
Dept: PHYSICAL THERAPY | Facility: CLINIC | Age: 64
Setting detail: THERAPIES SERIES
End: 2019-10-30
Attending: INTERNAL MEDICINE
Payer: COMMERCIAL

## 2019-10-30 PROCEDURE — 97140 MANUAL THERAPY 1/> REGIONS: CPT | Mod: GP | Performed by: PHYSICAL THERAPIST

## 2019-10-30 PROCEDURE — 97110 THERAPEUTIC EXERCISES: CPT | Mod: GP | Performed by: PHYSICAL THERAPIST

## 2019-11-07 ENCOUNTER — OFFICE VISIT (OUTPATIENT)
Dept: FAMILY MEDICINE | Facility: CLINIC | Age: 64
End: 2019-11-07
Payer: COMMERCIAL

## 2019-11-07 VITALS
RESPIRATION RATE: 16 BRPM | HEART RATE: 58 BPM | OXYGEN SATURATION: 99 % | TEMPERATURE: 97.9 F | BODY MASS INDEX: 32.73 KG/M2 | SYSTOLIC BLOOD PRESSURE: 128 MMHG | HEIGHT: 69 IN | DIASTOLIC BLOOD PRESSURE: 82 MMHG | WEIGHT: 221 LBS

## 2019-11-07 DIAGNOSIS — Z17.0 MALIGNANT NEOPLASM OF RIGHT BREAST IN FEMALE, ESTROGEN RECEPTOR POSITIVE, UNSPECIFIED SITE OF BREAST (H): ICD-10-CM

## 2019-11-07 DIAGNOSIS — Z00.00 ROUTINE HISTORY AND PHYSICAL EXAMINATION OF ADULT: Primary | ICD-10-CM

## 2019-11-07 DIAGNOSIS — I10 ESSENTIAL HYPERTENSION WITH GOAL BLOOD PRESSURE LESS THAN 140/90: ICD-10-CM

## 2019-11-07 DIAGNOSIS — C50.911 MALIGNANT NEOPLASM OF RIGHT BREAST IN FEMALE, ESTROGEN RECEPTOR POSITIVE, UNSPECIFIED SITE OF BREAST (H): ICD-10-CM

## 2019-11-07 DIAGNOSIS — Z12.11 SCREENING FOR COLON CANCER: ICD-10-CM

## 2019-11-07 LAB
ALBUMIN SERPL-MCNC: 3.6 G/DL (ref 3.4–5)
ALP SERPL-CCNC: 38 U/L (ref 40–150)
ALT SERPL W P-5'-P-CCNC: 14 U/L (ref 0–50)
ANION GAP SERPL CALCULATED.3IONS-SCNC: 8 MMOL/L (ref 3–14)
AST SERPL W P-5'-P-CCNC: 14 U/L (ref 0–45)
BILIRUB SERPL-MCNC: 0.3 MG/DL (ref 0.2–1.3)
BUN SERPL-MCNC: 11 MG/DL (ref 7–30)
CALCIUM SERPL-MCNC: 9 MG/DL (ref 8.5–10.1)
CHLORIDE SERPL-SCNC: 108 MMOL/L (ref 94–109)
CHOLEST SERPL-MCNC: 162 MG/DL
CO2 SERPL-SCNC: 22 MMOL/L (ref 20–32)
CREAT SERPL-MCNC: 0.64 MG/DL (ref 0.52–1.04)
ERYTHROCYTE [DISTWIDTH] IN BLOOD BY AUTOMATED COUNT: 13.1 % (ref 10–15)
GFR SERPL CREATININE-BSD FRML MDRD: >90 ML/MIN/{1.73_M2}
GLUCOSE SERPL-MCNC: 92 MG/DL (ref 70–99)
HCT VFR BLD AUTO: 42.6 % (ref 35–47)
HDLC SERPL-MCNC: 79 MG/DL
HGB BLD-MCNC: 14.3 G/DL (ref 11.7–15.7)
LDLC SERPL CALC-MCNC: 67 MG/DL
MCH RBC QN AUTO: 31.2 PG (ref 26.5–33)
MCHC RBC AUTO-ENTMCNC: 33.6 G/DL (ref 31.5–36.5)
MCV RBC AUTO: 93 FL (ref 78–100)
NONHDLC SERPL-MCNC: 83 MG/DL
PLATELET # BLD AUTO: 379 10E9/L (ref 150–450)
POTASSIUM SERPL-SCNC: 4 MMOL/L (ref 3.4–5.3)
PROT SERPL-MCNC: 6.9 G/DL (ref 6.8–8.8)
RBC # BLD AUTO: 4.59 10E12/L (ref 3.8–5.2)
SODIUM SERPL-SCNC: 138 MMOL/L (ref 133–144)
TRIGL SERPL-MCNC: 81 MG/DL
WBC # BLD AUTO: 6.2 10E9/L (ref 4–11)

## 2019-11-07 PROCEDURE — 99396 PREV VISIT EST AGE 40-64: CPT | Performed by: PHYSICIAN ASSISTANT

## 2019-11-07 PROCEDURE — 80061 LIPID PANEL: CPT | Performed by: PHYSICIAN ASSISTANT

## 2019-11-07 PROCEDURE — 80053 COMPREHEN METABOLIC PANEL: CPT | Performed by: PHYSICIAN ASSISTANT

## 2019-11-07 PROCEDURE — 36415 COLL VENOUS BLD VENIPUNCTURE: CPT | Performed by: PHYSICIAN ASSISTANT

## 2019-11-07 PROCEDURE — 85027 COMPLETE CBC AUTOMATED: CPT | Performed by: PHYSICIAN ASSISTANT

## 2019-11-07 ASSESSMENT — MIFFLIN-ST. JEOR: SCORE: 1618.89

## 2019-11-07 NOTE — PROGRESS NOTES
SUBJECTIVE:   CC: Ila Pierre is an 63 year old woman who presents for preventive health visit.     Healthy Habits:     Getting at least 3 servings of Calcium per day:  Yes    Bi-annual eye exam:  Yes    Dental care twice a year:  Yes    Sleep apnea or symptoms of sleep apnea:  None    Diet:  Regular (no restrictions)    Frequency of exercise:  4-5 days/week    Duration of exercise:  15-30 minutes    Taking medications regularly:  Yes    Medication side effects:  Significant flushing and Lightheadedness    PHQ-2 Total Score: 0    Additional concerns today:  No    Today's PHQ-2 Score:   PHQ-2 (  [employer]) 2019   Q1: Little interest or pleasure in doing things 0   Q2: Feeling down, depressed or hopeless 0   PHQ-2 Score 0   Q1: Little interest or pleasure in doing things Not at all   Q2: Feeling down, depressed or hopeless Not at all   PHQ-2 Score 0       Abuse: Current or Past(Physical, Sexual or Emotional)- No  Do you feel safe in your environment? Yes        Social History     Tobacco Use     Smoking status: Former Smoker     Last attempt to quit: 10/19/1997     Years since quittin.0     Smokeless tobacco: Never Used   Substance Use Topics     Alcohol use: Yes     Comment: 3 beer - 3 times a week       Alcohol Use 2019   Prescreen: >3 drinks/day or >7 drinks/week? No   Prescreen: >3 drinks/day or >7 drinks/week? -       Reviewed orders with patient.  Reviewed health maintenance and updated orders accordingly - Yes  BP Readings from Last 3 Encounters:   19 128/82   10/22/19 (!) 140/84   19 (!) 150/81    Wt Readings from Last 3 Encounters:   19 100.2 kg (221 lb)   10/22/19 102.3 kg (225 lb 9.6 oz)   19 102.9 kg (226 lb 12.8 oz)                  Recent Labs   Lab Test 19  1534 18  0910 18  0809 17  0857 16  0945  10/23/12  0917   LDL  --   --  64 60 82   < >  --    HDL  --   --  74 80 90   < >  --    TRIG  --   --  60 60 56   < >  --   "  ALT 19  --   --   --   --   --  18   CR 0.66 0.60 0.72  --   --   --  0.90   GFRESTIMATED >90 >90 82  --   --   --  65   GFRESTBLACK >90 >90 >90  --   --   --  79   POTASSIUM 4.3 4.0 4.1  --   --   --  5.0    < > = values in this interval not displayed.        Alternate mammogram schedule due to breast cancer history     Pertinent mammograms are reviewed under the imaging tab.  History of abnormal Pap smear: Status post benign hysterectomy. Health Maintenance and Surgical History updated.  PAP / HPV 10/23/2012   PAP NIL     Reviewed and updated as needed this visit by clinical staff  Tobacco  Allergies  Meds  Problems  Med Hx  Surg Hx  Fam Hx  Soc Hx          Reviewed and updated as needed this visit by Provider  Tobacco  Allergies  Meds  Problems  Med Hx  Surg Hx  Fam Hx  Soc Hx           Review of Systems  CONSTITUTIONAL: NEGATIVE for fever, chills, change in weight  INTEGUMENTARY/SKIN: NEGATIVE for worrisome rashes, moles or lesions  EYES: NEGATIVE for vision changes or irritation  ENT: NEGATIVE for ear, mouth and throat problems  RESP: NEGATIVE for significant cough or SOB  BREAST: NEGATIVE for masses, tenderness or discharge  CV: NEGATIVE for chest pain, palpitations or peripheral edema  GI: NEGATIVE for nausea, abdominal pain, heartburn, or change in bowel habits  : NEGATIVE for unusual urinary or vaginal symptoms. No vaginal bleeding.  MUSCULOSKELETAL: NEGATIVE for significant arthralgias or myalgia  NEURO: NEGATIVE for weakness, dizziness or paresthesias  ENDOCRINE: POSITIVE  for hot flashes  PSYCHIATRIC: NEGATIVE for changes in mood or affect      OBJECTIVE:   /82 (Patient Position: Sitting, Cuff Size: Adult Large)   Pulse 58   Temp 97.9  F (36.6  C) (Tympanic)   Resp 16   Ht 1.74 m (5' 8.5\")   Wt 100.2 kg (221 lb)   SpO2 99%   BMI 33.11 kg/m    Physical Exam  GENERAL APPEARANCE: healthy, alert and no distress  EYES: Eyes grossly normal to inspection, PERRL and conjunctivae " "and sclerae normal  HENT: ear canals and TM's normal, nose and mouth without ulcers or lesions, oropharynx clear and oral mucous membranes moist  NECK: no adenopathy, no asymmetry, masses, or scars and thyroid normal to palpation  RESP: lungs clear to auscultation - no rales, rhonchi or wheezes  BREAST: left normal without masses, tenderness or nipple discharge and no palpable axillary masses or adenopathy, right surgically replaced  CV: regular rate and rhythm, normal S1 S2, no S3 or S4, no murmur, click or rub, no peripheral edema and peripheral pulses strong  ABDOMEN: soft, nontender, no hepatosplenomegaly, no masses and bowel sounds normal  MS: no musculoskeletal defects are noted and gait is age appropriate without ataxia  SKIN: no suspicious lesions or rashes  NEURO: Normal strength and tone, sensory exam grossly normal, mentation intact and speech normal  PSYCH: mentation appears normal and affect normal/bright    Diagnostic Test Results:  Labs reviewed in Epic    ASSESSMENT/PLAN:       ICD-10-CM    1. Routine history and physical examination of adult Z00.00 Lipid panel reflex to direct LDL Fasting     CBC with platelets     Comprehensive metabolic panel   2. Malignant neoplasm of right breast in female, estrogen receptor positive, unspecified site of breast (H) C50.911     Z17.0    3. Screening for colon cancer Z12.11 GASTROENTEROLOGY ADULT REF PROCEDURE ONLY Other; [facility] [phone]   4. Essential hypertension with goal blood pressure less than 140/90 I10      Mole on the left cheek.    Will get colonoscopy next year.  Referral placed.      COUNSELING:  Reviewed preventive health counseling, as reflected in patient instructions    Estimated body mass index is 33.11 kg/m  as calculated from the following:    Height as of this encounter: 1.74 m (5' 8.5\").    Weight as of this encounter: 100.2 kg (221 lb).    Weight management plan: Discussed healthy diet and exercise guidelines     reports that she quit " smoking about 22 years ago. She has never used smokeless tobacco.      Counseling Resources:  ATP IV Guidelines  Pooled Cohorts Equation Calculator  Breast Cancer Risk Calculator  FRAX Risk Assessment  ICSI Preventive Guidelines  Dietary Guidelines for Americans, 2010  USDA's MyPlate  ASA Prophylaxis  Lung CA Screening    Aleshia Meléndez PA-C  [facility]

## 2019-11-07 NOTE — LETTER
November 8, 2019      Ila Pierre  [address] [place] [address]        Dear ,    We are writing to inform you of your test results.    Your test results fall within the expected range(s) or remain unchanged from previous results.  Please continue with current treatment plan.    Resulted Orders   Lipid panel reflex to direct LDL Fasting   Result Value Ref Range    Cholesterol 162 <200 mg/dL    Triglycerides 81 <150 mg/dL      Comment:      Fasting specimen    HDL Cholesterol 79 >49 mg/dL    LDL Cholesterol Calculated 67 <100 mg/dL      Comment:      Desirable:       <100 mg/dl    Non HDL Cholesterol 83 <130 mg/dL   CBC with platelets   Result Value Ref Range    WBC 6.2 4.0 - 11.0 10e9/L    RBC Count 4.59 3.8 - 5.2 10e12/L    Hemoglobin 14.3 11.7 - 15.7 g/dL    Hematocrit 42.6 35.0 - 47.0 %    MCV 93 78 - 100 fl    MCH 31.2 26.5 - 33.0 pg    MCHC 33.6 31.5 - 36.5 g/dL    RDW 13.1 10.0 - 15.0 %    Platelet Count 379 150 - 450 10e9/L   Comprehensive metabolic panel   Result Value Ref Range    Sodium 138 133 - 144 mmol/L    Potassium 4.0 3.4 - 5.3 mmol/L    Chloride 108 94 - 109 mmol/L    Carbon Dioxide 22 20 - 32 mmol/L    Anion Gap 8 3 - 14 mmol/L    Glucose 92 70 - 99 mg/dL      Comment:      Fasting specimen    Urea Nitrogen 11 7 - 30 mg/dL    Creatinine 0.64 0.52 - 1.04 mg/dL    GFR Estimate >90 >60 mL/min/[1.73_m2]      Comment:      Non  GFR Calc  Starting 12/18/2018, serum creatinine based estimated GFR (eGFR) will be   calculated using the Chronic Kidney Disease Epidemiology Collaboration   (CKD-EPI) equation.      GFR Estimate If Black >90 >60 mL/min/[1.73_m2]      Comment:       GFR Calc  Starting 12/18/2018, serum creatinine based estimated GFR (eGFR) will be   calculated using the Chronic Kidney Disease Epidemiology Collaboration   (CKD-EPI) equation.      Calcium 9.0 8.5 - 10.1 mg/dL    Bilirubin Total 0.3 0.2 - 1.3 mg/dL    Albumin 3.6 3.4 -  5.0 g/dL    Protein Total 6.9 6.8 - 8.8 g/dL    Alkaline Phosphatase 38 (L) 40 - 150 U/L    ALT 14 0 - 50 U/L    AST 14 0 - 45 U/L       If you have any questions or concerns, please call the clinic at the number listed above.       Sincerely,        Aleshia Meléndez PA-C

## 2019-11-14 ENCOUNTER — HOSPITAL ENCOUNTER (OUTPATIENT)
Dept: PHYSICAL THERAPY | Facility: CLINIC | Age: 64
Setting detail: THERAPIES SERIES
End: 2019-11-14
Attending: INTERNAL MEDICINE
Payer: COMMERCIAL

## 2019-11-14 PROCEDURE — 97110 THERAPEUTIC EXERCISES: CPT | Mod: GP | Performed by: PHYSICAL THERAPIST

## 2019-11-14 PROCEDURE — 97140 MANUAL THERAPY 1/> REGIONS: CPT | Mod: GP | Performed by: PHYSICAL THERAPIST

## 2019-11-19 ENCOUNTER — ANCILLARY PROCEDURE (OUTPATIENT)
Dept: MAMMOGRAPHY | Facility: CLINIC | Age: 64
End: 2019-11-19
Payer: COMMERCIAL

## 2019-11-19 DIAGNOSIS — Z12.31 VISIT FOR SCREENING MAMMOGRAM: ICD-10-CM

## 2019-11-19 DIAGNOSIS — Z12.39 BREAST CANCER SCREENING: ICD-10-CM

## 2019-11-19 PROCEDURE — 77063 BREAST TOMOSYNTHESIS BI: CPT | Mod: TC

## 2019-11-19 PROCEDURE — 77067 SCR MAMMO BI INCL CAD: CPT | Mod: TC

## 2020-01-07 ENCOUNTER — ONCOLOGY VISIT (OUTPATIENT)
Dept: ONCOLOGY | Facility: CLINIC | Age: 65
End: 2020-01-07
Attending: INTERNAL MEDICINE
Payer: COMMERCIAL

## 2020-01-07 VITALS
BODY MASS INDEX: 32.88 KG/M2 | HEIGHT: 69 IN | RESPIRATION RATE: 16 BRPM | DIASTOLIC BLOOD PRESSURE: 88 MMHG | OXYGEN SATURATION: 99 % | WEIGHT: 222 LBS | SYSTOLIC BLOOD PRESSURE: 148 MMHG | HEART RATE: 64 BPM

## 2020-01-07 DIAGNOSIS — Z12.39 BREAST CANCER SCREENING: Primary | ICD-10-CM

## 2020-01-07 PROCEDURE — 99213 OFFICE O/P EST LOW 20 MIN: CPT | Performed by: NURSE PRACTITIONER

## 2020-01-07 PROCEDURE — G0463 HOSPITAL OUTPT CLINIC VISIT: HCPCS

## 2020-01-07 ASSESSMENT — PAIN SCALES - GENERAL: PAINLEVEL: NO PAIN (0)

## 2020-01-07 ASSESSMENT — MIFFLIN-ST. JEOR: SCORE: 1618.43

## 2020-01-07 NOTE — PROGRESS NOTES
Oncology/Hematology Visit Note  Jan 7, 2020    Reason for Visit: follow up of right breast cancer  ER positive SC positive HER-2 negative with negative margins Oncotype DX score is 10  Due to the side effects patient could not tolerate anastrozole exemestane and letrozole.  The patient started tamoxifen in September 2019  Patient comes for follow-up      Interval History:  Patient reports she continues to have hard time with tamoxifen she continues to have hot flashes and myalgias/arthralgias  She has not started taking Effexor.  Patient reports she is hesitant to start Effexor.  She is also wondering if she can stop the treatment.      Review of Systems:  14 point ROS of systems including Constitutional, Eyes, Respiratory, Cardiovascular, Gastroenterology, Genitourinary, Integumentary, Muscularskeletal, Psychiatric were all negative except for pertinent positives noted in my HPI.      Current Outpatient Medications   Medication Sig Dispense Refill     aspirin 81 MG tablet Take 81 mg by mouth daily        atenolol (TENORMIN) 50 MG tablet TAKE 1 TABLET(50 MG) BY MOUTH DAILY 90 tablet 3     Calcium Carb-Cholecalciferol (CALCIUM 1000 + D) 1000-800 MG-UNIT TABS Take by mouth daily       Cholecalciferol (VITAMIN D3 PO) Take 5,000 Units by mouth every evening Takes at 18:00       l-tyrosine 500 MG TABS Take 2 tablets by mouth daily.       Misc Natural Products (GLUCOSAMINE CHOND MSM FORMULA PO) Take 1 tablet by mouth every evening (Glucosamine 500 mg Chondroitin 400 mg  mg) takes at 18:00       Multiple Vitamins-Minerals (CENTRUM SILVER) per tablet Take 1 tablet by mouth every evening        Omega-3 Fatty Acids (FISH OIL PO) Take 600 mg by mouth every evening Takes at 18:00       order for DME Equipment being ordered: post mastectomy garment.     Scheduled for right mastectomy, right SLNB, possible axillary node dissection, tissue expander on 12/14/18. 2 Units 0     Probiotic Product (PROBIOTIC DAILY PO) Take 1  "capsule by mouth every evening Acidophilus with goats milk 343 mg- 10 mg.  Takes at 18:00       tamoxifen (NOLVADEX) 10 MG tablet Take 1 tablet (10 mg) by mouth daily 90 tablet 3       Physical Examination:  General: The patient is a pleasant female in no acute distress.  BP (!) 148/88   Pulse 64   Resp 16   Ht 1.74 m (5' 8.5\")   Wt 100.7 kg (222 lb)   SpO2 99%   BMI 33.26 kg/m    HEENT: EOMI, PERRL. Sclerae are anicteric. Oral mucosa is pink and moist with no lesions or thrush.   Lymph: Neck is supple with no lymphadenopathy in the cervical or supraclavicular areas.   Heart: Regular rate and rhythm.   Lungs: Clear to auscultation bilaterally.   GI: Bowel sounds present, soft, nontender with no palpable hepatosplenomegaly or masses.   Extremities: No lower extremity edema noted bilaterally.   Skin: No rashes, petechiae, or bruising noted on exposed skin.    Laboratory Data:  No results found for this or any previous visit (from the past 24 hour(s)).      Assessment and Plan:    This is a 63-year-old female with      right breast cancer  ER positive MA positive HER-2 negative with negative margins Oncotype DX score is 10  Due to the side effects patient could not tolerate anastrozole exemestane and letrozole.  The patient started tamoxifen in September 2019  -Patient complains of the side effects from tamoxifen.  Hot flashes and myalgias/arthralgias   -Patient has not started Effexor to help with the side effects  Patient reports she will continue to take tamoxifen 10 mg daily until she sees Dr. Molina  Patient is seen Dr. Molina in February    Bone health  Continue with calcium and vitamin D  Next DEXA scan should be in January 2021        AYAN Butt [facility] [place]     Chart documentation with Dragon Voice recognition Software. Although reviewed after completion, some words and grammatical errors may remain.        "

## 2020-01-07 NOTE — LETTER
"    1/7/2020         RE: Ila Pierre  [address] [facility] [address]        Dear Colleague,    Thank you for referring your patient, Ila Pierre, to the [facility]. Please see a copy of my visit note below.    Oncology Rooming Note    January 7, 2020 1:10 PM   Ila Pierre is a 64 year old female who presents for:    Chief Complaint   Patient presents with     Oncology Clinic Visit     Initial Vitals: BP (!) 148/88   Pulse 64   Resp 16   Ht 1.74 m (5' 8.5\")   Wt 100.7 kg (222 lb)   SpO2 99%   BMI 33.26 kg/m    Estimated body mass index is 33.26 kg/m  as calculated from the following:    Height as of this encounter: 1.74 m (5' 8.5\").    Weight as of this encounter: 100.7 kg (222 lb). Body surface area is 2.21 meters squared.  No Pain (0) Comment: Data Unavailable   No LMP recorded. Patient has had a hysterectomy.  Allergies reviewed: Yes  Medications reviewed: Yes    Medications: Medication refills not needed today.  Pharmacy name entered into [employer]: [id number] [address] [place] [facility] [address] [facility]    Clinical concerns: no      Lara Machado CMA              Oncology/Hematology Visit Note  Jan 7, 2020    Reason for Visit: follow up of right breast cancer  ER positive RI positive HER-2 negative with negative margins Oncotype DX score is 10  Due to the side effects patient could not tolerate anastrozole exemestane and letrozole.  The patient started tamoxifen in September 2019  Patient comes for follow-up      Interval History:  Patient reports she continues to have hard time with tamoxifen she continues to have hot flashes and myalgias/arthralgias  She has not started taking Effexor.  Patient reports she is hesitant to start Effexor.  She is also wondering if she can stop the treatment.      Review of Systems:  14 point ROS of systems including Constitutional, Eyes, Respiratory, Cardiovascular, Gastroenterology, " "Genitourinary, Integumentary, Muscularskeletal, Psychiatric were all negative except for pertinent positives noted in my HPI.      Current Outpatient Medications   Medication Sig Dispense Refill     aspirin 81 MG tablet Take 81 mg by mouth daily        atenolol (TENORMIN) 50 MG tablet TAKE 1 TABLET(50 MG) BY MOUTH DAILY 90 tablet 3     Calcium Carb-Cholecalciferol (CALCIUM 1000 + D) 1000-800 MG-UNIT TABS Take by mouth daily       Cholecalciferol (VITAMIN D3 PO) Take 5,000 Units by mouth every evening Takes at 18:00       l-tyrosine 500 MG TABS Take 2 tablets by mouth daily.       Misc Natural Products (GLUCOSAMINE CHOND MSM FORMULA PO) Take 1 tablet by mouth every evening (Glucosamine 500 mg Chondroitin 400 mg  mg) takes at 18:00       Multiple Vitamins-Minerals (CENTRUM SILVER) per tablet Take 1 tablet by mouth every evening        Omega-3 Fatty Acids (FISH OIL PO) Take 600 mg by mouth every evening Takes at 18:00       order for DME Equipment being ordered: post mastectomy garment.     Scheduled for right mastectomy, right SLNB, possible axillary node dissection, tissue expander on 12/14/18. 2 Units 0     Probiotic Product (PROBIOTIC DAILY PO) Take 1 capsule by mouth every evening Acidophilus with goats milk 343 mg- 10 mg.  Takes at 18:00       tamoxifen (NOLVADEX) 10 MG tablet Take 1 tablet (10 mg) by mouth daily 90 tablet 3       Physical Examination:  General: The patient is a pleasant female in no acute distress.  BP (!) 148/88   Pulse 64   Resp 16   Ht 1.74 m (5' 8.5\")   Wt 100.7 kg (222 lb)   SpO2 99%   BMI 33.26 kg/m     HEENT: EOMI, PERRL. Sclerae are anicteric. Oral mucosa is pink and moist with no lesions or thrush.   Lymph: Neck is supple with no lymphadenopathy in the cervical or supraclavicular areas.   Heart: Regular rate and rhythm.   Lungs: Clear to auscultation bilaterally.   GI: Bowel sounds present, soft, nontender with no palpable hepatosplenomegaly or masses.   Extremities: No " lower extremity edema noted bilaterally.   Skin: No rashes, petechiae, or bruising noted on exposed skin.    Laboratory Data:  No results found for this or any previous visit (from the past 24 hour(s)).      Assessment and Plan:    This is a 63-year-old female with      right breast cancer  ER positive ND positive HER-2 negative with negative margins Oncotype DX score is 10  Due to the side effects patient could not tolerate anastrozole exemestane and letrozole.  The patient started tamoxifen in September 2019  -Patient complains of the side effects from tamoxifen.  Hot flashes and myalgias/arthralgias    -Patient has not started Effexor to help with the side effects  Patient reports she will continue to take tamoxifen 10 mg daily until she sees Dr. Molina  Patient is seen Dr. Molina in February    Bone health  Continue with calcium and vitamin D  Next DEXA scan should be in January 2021        AYAN Butt CNP  [facility] [place]     Chart documentation with Dragon Voice recognition Software. Although reviewed after completion, some words and grammatical errors may remain.          Again, thank you for allowing me to participate in the care of your patient.        Sincerely,        AYAN Butt CNP

## 2020-01-07 NOTE — PROGRESS NOTES
"Oncology Rooming Note    January 7, 2020 1:10 PM   Ila Pierre is a 64 year old female who presents for:    Chief Complaint   Patient presents with     Oncology Clinic Visit     Initial Vitals: BP (!) 148/88   Pulse 64   Resp 16   Ht 1.74 m (5' 8.5\")   Wt 100.7 kg (222 lb)   SpO2 99%   BMI 33.26 kg/m   Estimated body mass index is 33.26 kg/m  as calculated from the following:    Height as of this encounter: 1.74 m (5' 8.5\").    Weight as of this encounter: 100.7 kg (222 lb). Body surface area is 2.21 meters squared.  No Pain (0) Comment: Data Unavailable   No LMP recorded. Patient has had a hysterectomy.  Allergies reviewed: Yes  Medications reviewed: Yes    Medications: Medication refills not needed today.  Pharmacy name entered into [employer]: [id number] [place], [place] - [address] AT [facility] & [facility]    Clinical concerns: no      Lara Machado CMA            "

## 2020-01-08 DIAGNOSIS — I10 ESSENTIAL HYPERTENSION WITH GOAL BLOOD PRESSURE LESS THAN 140/90: ICD-10-CM

## 2020-01-29 PROBLEM — C50.919 BREAST CANCER (H): Status: ACTIVE | Noted: 2018-12-14

## 2020-09-28 ENCOUNTER — TELEPHONE (OUTPATIENT)
Dept: FAMILY MEDICINE | Facility: CLINIC | Age: 65
End: 2020-09-28

## 2020-09-28 NOTE — TELEPHONE ENCOUNTER
Reason for Call: Request for an order or referral:    Order or referral being requested: order for Cologaurd    Date needed: as soon as possible    Has the patient been seen by the PCP for this problem? YES    Additional comments: The patient called and stated that she would like to get the cologaurd test done and is wondering if Aleshia Meléndez would be willing to put an order in for it?      Phone number Patient can be reached at:  Home number on file [phone] (home)    Best Time:  Any    Can we leave a detailed message on this number?  YES    Call taken on 9/28/2020 at 8:49 AM by Slime Woodall

## 2020-10-05 ENCOUNTER — TRANSFERRED RECORDS (OUTPATIENT)
Dept: HEALTH INFORMATION MANAGEMENT | Facility: CLINIC | Age: 65
End: 2020-10-05

## 2020-10-05 DIAGNOSIS — I10 ESSENTIAL HYPERTENSION WITH GOAL BLOOD PRESSURE LESS THAN 140/90: ICD-10-CM

## 2020-10-05 LAB — COLOGUARD-ABSTRACT: NEGATIVE

## 2020-10-05 RX ORDER — ATENOLOL 50 MG/1
50 TABLET ORAL DAILY
Qty: 90 TABLET | Refills: 3 | Status: SHIPPED | OUTPATIENT
Start: 2020-10-05 | End: 2021-09-21

## 2020-10-30 ENCOUNTER — TELEPHONE (OUTPATIENT)
Dept: FAMILY MEDICINE | Facility: CLINIC | Age: 65
End: 2020-10-30

## 2020-10-30 NOTE — TELEPHONE ENCOUNTER
Reason for Call:  Request for results:    Name of test or procedure: cologuard    Date of test of procedure: 10/5/20--sent in    Location of the test or procedure: [place]---done at home    OK to leave the result message on voice mail or with a family member? YES    Phone number Patient can be reached at:  Cell number on file:    Telephone Information:   Mobile [phone]       Additional comments: Pt was notified that the results were sent to pt's provider 10/23/20.    Call taken on 10/30/2020 at 2:44 PM by BRIAN CORTEZ

## 2021-02-03 ENCOUNTER — ANCILLARY PROCEDURE (OUTPATIENT)
Dept: MAMMOGRAPHY | Facility: CLINIC | Age: 66
End: 2021-02-03
Attending: INTERNAL MEDICINE
Payer: COMMERCIAL

## 2021-02-03 DIAGNOSIS — Z12.31 VISIT FOR SCREENING MAMMOGRAM: ICD-10-CM

## 2021-02-03 PROCEDURE — 77063 BREAST TOMOSYNTHESIS BI: CPT | Mod: TC | Performed by: RADIOLOGY

## 2021-02-03 PROCEDURE — 77067 SCR MAMMO BI INCL CAD: CPT | Mod: TC | Performed by: RADIOLOGY

## 2021-04-06 ENCOUNTER — MEDICAL CORRESPONDENCE (OUTPATIENT)
Dept: HEALTH INFORMATION MANAGEMENT | Facility: CLINIC | Age: 66
End: 2021-04-06

## 2021-04-07 ENCOUNTER — OFFICE VISIT (OUTPATIENT)
Dept: INTERNAL MEDICINE | Facility: CLINIC | Age: 66
End: 2021-04-07
Payer: COMMERCIAL

## 2021-04-07 VITALS
SYSTOLIC BLOOD PRESSURE: 166 MMHG | HEART RATE: 64 BPM | BODY MASS INDEX: 34.02 KG/M2 | RESPIRATION RATE: 18 BRPM | HEIGHT: 69 IN | WEIGHT: 229.7 LBS | DIASTOLIC BLOOD PRESSURE: 88 MMHG | OXYGEN SATURATION: 98 %

## 2021-04-07 DIAGNOSIS — M54.16 LUMBAR BACK PAIN WITH RADICULOPATHY AFFECTING LEFT LOWER EXTREMITY: ICD-10-CM

## 2021-04-07 DIAGNOSIS — Z01.818 PREOP GENERAL PHYSICAL EXAM: Primary | ICD-10-CM

## 2021-04-07 DIAGNOSIS — M71.38 SYNOVIAL CYST OF LUMBAR FACET JOINT: ICD-10-CM

## 2021-04-07 DIAGNOSIS — C50.911 MALIGNANT NEOPLASM OF RIGHT BREAST IN FEMALE, ESTROGEN RECEPTOR POSITIVE, UNSPECIFIED SITE OF BREAST (H): ICD-10-CM

## 2021-04-07 DIAGNOSIS — J44.9 CHRONIC OBSTRUCTIVE PULMONARY DISEASE, UNSPECIFIED COPD TYPE (H): ICD-10-CM

## 2021-04-07 DIAGNOSIS — I10 ESSENTIAL HYPERTENSION WITH GOAL BLOOD PRESSURE LESS THAN 140/90: ICD-10-CM

## 2021-04-07 DIAGNOSIS — Z17.0 MALIGNANT NEOPLASM OF RIGHT BREAST IN FEMALE, ESTROGEN RECEPTOR POSITIVE, UNSPECIFIED SITE OF BREAST (H): ICD-10-CM

## 2021-04-07 LAB
ANION GAP SERPL CALCULATED.3IONS-SCNC: 1 MMOL/L (ref 3–14)
BASOPHILS # BLD AUTO: 0 10E9/L (ref 0–0.2)
BASOPHILS NFR BLD AUTO: 0.1 %
BUN SERPL-MCNC: 11 MG/DL (ref 7–30)
CALCIUM SERPL-MCNC: 9.7 MG/DL (ref 8.5–10.1)
CHLORIDE SERPL-SCNC: 107 MMOL/L (ref 94–109)
CO2 SERPL-SCNC: 28 MMOL/L (ref 20–32)
CREAT SERPL-MCNC: 0.68 MG/DL (ref 0.52–1.04)
DIFFERENTIAL METHOD BLD: NORMAL
EOSINOPHIL # BLD AUTO: 0 10E9/L (ref 0–0.7)
EOSINOPHIL NFR BLD AUTO: 0.2 %
ERYTHROCYTE [DISTWIDTH] IN BLOOD BY AUTOMATED COUNT: 12.9 % (ref 10–15)
GFR SERPL CREATININE-BSD FRML MDRD: >90 ML/MIN/{1.73_M2}
GLUCOSE SERPL-MCNC: 112 MG/DL (ref 70–99)
HCT VFR BLD AUTO: 43.4 % (ref 35–47)
HGB BLD-MCNC: 14.4 G/DL (ref 11.7–15.7)
LYMPHOCYTES # BLD AUTO: 1.6 10E9/L (ref 0.8–5.3)
LYMPHOCYTES NFR BLD AUTO: 18.2 %
MCH RBC QN AUTO: 31.4 PG (ref 26.5–33)
MCHC RBC AUTO-ENTMCNC: 33.2 G/DL (ref 31.5–36.5)
MCV RBC AUTO: 95 FL (ref 78–100)
MONOCYTES # BLD AUTO: 0.6 10E9/L (ref 0–1.3)
MONOCYTES NFR BLD AUTO: 7.2 %
NEUTROPHILS # BLD AUTO: 6.3 10E9/L (ref 1.6–8.3)
NEUTROPHILS NFR BLD AUTO: 74.3 %
PLATELET # BLD AUTO: 379 10E9/L (ref 150–450)
POTASSIUM SERPL-SCNC: 4.2 MMOL/L (ref 3.4–5.3)
RBC # BLD AUTO: 4.59 10E12/L (ref 3.8–5.2)
SODIUM SERPL-SCNC: 136 MMOL/L (ref 133–144)
WBC # BLD AUTO: 8.5 10E9/L (ref 4–11)

## 2021-04-07 PROCEDURE — 85025 COMPLETE CBC W/AUTO DIFF WBC: CPT | Performed by: PHYSICIAN ASSISTANT

## 2021-04-07 PROCEDURE — 99214 OFFICE O/P EST MOD 30 MIN: CPT | Performed by: PHYSICIAN ASSISTANT

## 2021-04-07 PROCEDURE — 36415 COLL VENOUS BLD VENIPUNCTURE: CPT | Performed by: PHYSICIAN ASSISTANT

## 2021-04-07 PROCEDURE — 80048 BASIC METABOLIC PNL TOTAL CA: CPT | Performed by: PHYSICIAN ASSISTANT

## 2021-04-07 PROCEDURE — 93000 ELECTROCARDIOGRAM COMPLETE: CPT | Performed by: PHYSICIAN ASSISTANT

## 2021-04-07 RX ORDER — OXYCODONE AND ACETAMINOPHEN 5; 325 MG/1; MG/1
1-2 TABLET ORAL
COMMUNITY
Start: 2021-04-05 | End: 2021-09-14

## 2021-04-07 RX ORDER — GABAPENTIN 100 MG/1
CAPSULE ORAL
COMMUNITY
Start: 2021-03-31 | End: 2021-09-14

## 2021-04-07 ASSESSMENT — MIFFLIN-ST. JEOR: SCORE: 1648.35

## 2021-04-07 NOTE — PATIENT INSTRUCTIONS
Stop aspirin and fish oil one week to 10 days prior to surgery    Ok to restart after    Preparing for Your Surgery  Getting started  A nurse will call you to review your health history and instructions. They will give you an arrival time based on your scheduled surgery time.  Please be ready to share the following:    Your doctor's clinic name and phone number    Your medical, surgical and anesthesia history    A list of allergies and sensitivities    A list of medicines, including herbal treatments and over-the-counter drugs    Whether the patient has a legal guardian (ask how to send us the papers in advance)  If you have a child who's having surgery, please ask for a copy of Preparing for Your Child's Surgery.    Preparing for surgery    Within 30 days of surgery: Have a pre-op exam (sometimes called an H&P, or History and Physical). This can be done at a clinic or pre-operative center.  ? If you're having a , you may not need this exam. Talk to your care team    At your pre-op exam, talk to your care team about all medicines you take. If you need to stop any medicines before surgery, ask when to start taking them again.  ? We do this for your safety. Many medicines can make you bleed too much during surgery. Some change how well surgery (anesthesia) drugs work.    Call your insurance company to let them know you're having surgery. (If you don't have insurance, call [phone].)    Call your clinic if there's any change in your health. This includes signs of a cold or flu (sore throat, runny nose, cough, rash, fever). It also includes a scrape or scratch near the surgery site.    If you have questions on the day of surgery, call your hospital or surgery center.  Eating and drinking guidelines  For your safety: Unless your surgeon tells you otherwise, follow the guidelines below.    Eat and drink as usual until 8 hours before surgery. After that, no food or milk.    Drink clear liquids until 2 hours  before surgery. These are liquids you can see through, like water, Gatorade and Propel Water. You may also have black coffee and tea (no cream or milk).    Nothing by mouth within 2 hours of surgery. This includes gum, candy and breath mints.    If you drink, stop drinking alcohol the night before surgery.    If your care team tells you to take medicine on the morning of surgery, it's okay to take it with a sip of water.  Preventing infection    Shower or bathe the night before and morning of your surgery. Follow the instructions your clinic gave you. (If no instructions, use regular soap.)    Don't shave or clip hair near your surgery site. We'll remove the hair if needed.    Don't smoke or vape the morning of surgery. You may chew nicotine gum up to 2 hours before surgery. A nicotine patch is okay.  ? Note: Some surgeries require you to completely quit smoking and nicotine. Check with your surgeon.    Your care team will make every effort to keep you safe from infection. We will:  ? Clean our hands often with soap and water (or an alcohol-based hand rub).  ? Clean the skin at your surgery site with a special soap that kills germs.  ? Give you a special gown to keep you warm. (Cold raises the risk of infection.)  ? Wear special hair covers, masks, gowns and gloves during surgery.  ? Give antibiotic medicine, if prescribed. Not all surgeries need antibiotics.  What to bring on the day of surgery    Photo ID and insurance card    Copy of your health care directive, if you have one    Glasses and hearing aides (bring cases)  ? You can't wear contacts during surgery    Inhaler and eye drops, if you use them (tell us about these when you arrive)    CPAP machine or breathing device, if you use them    A few personal items, if spending the night    If you have . . .  ? A pacemaker or ICD (cardiac defibrillator): Bring the ID card.  ? An implanted stimulator: Bring the remote control.  ? A legal guardian: Bring a copy of  the certified (court-stamped) guardianship papers.  Please remove any jewelry, including body piercings. Leave jewelry and other valuables at home.  If you're going home the day of surgery  Important: If you don't follow the rules below, we must cancel your surgery.     Arrange for someone to drive you home after surgery. You may not drive, take a taxi or take public transportation by yourself (unless you'll have local anesthesia only).    Arrange for a responsible adult to stay with you overnight. If you don't, we may keep you in the hospital overnight, and you may need to pay the costs yourself.  Questions?   If you have any questions for your care team, list them here: _________________________________________________________________________________________________________________________________________________________________________________________________________________________________________________________________________________________________________________________  For informational purposes only. Not to replace the advice of your health care provider. Copyright   2003, 2019 [employer]. All rights reserved. Clinically reviewed by Vero Stewart MD. What's in My Handbag [id number] - REV 4/20.

## 2021-04-21 ENCOUNTER — IMMUNIZATION (OUTPATIENT)
Dept: NURSING | Facility: CLINIC | Age: 66
End: 2021-04-21
Payer: COMMERCIAL

## 2021-04-21 PROCEDURE — 91300 PR COVID VAC PFIZER DIL RECON 30 MCG/0.3 ML IM: CPT

## 2021-04-21 PROCEDURE — 0001A PR COVID VAC PFIZER DIL RECON 30 MCG/0.3 ML IM: CPT

## 2021-05-12 ENCOUNTER — IMMUNIZATION (OUTPATIENT)
Dept: NURSING | Facility: CLINIC | Age: 66
End: 2021-05-12
Attending: INTERNAL MEDICINE
Payer: COMMERCIAL

## 2021-05-12 PROCEDURE — 0002A PR COVID VAC PFIZER DIL RECON 30 MCG/0.3 ML IM: CPT

## 2021-05-12 PROCEDURE — 91300 PR COVID VAC PFIZER DIL RECON 30 MCG/0.3 ML IM: CPT

## 2021-09-05 ENCOUNTER — HEALTH MAINTENANCE LETTER (OUTPATIENT)
Age: 66
End: 2021-09-05

## 2021-09-11 ASSESSMENT — ENCOUNTER SYMPTOMS
DYSURIA: 0
ARTHRALGIAS: 1
NAUSEA: 0
HEADACHES: 0
PARESTHESIAS: 0
ABDOMINAL PAIN: 0
CONSTIPATION: 0
EYE PAIN: 0
CHILLS: 0
COUGH: 0
HEMATURIA: 0
DIZZINESS: 0
MYALGIAS: 1
FREQUENCY: 0
FEVER: 0
BREAST MASS: 0
WEAKNESS: 0
HEMATOCHEZIA: 0
SORE THROAT: 0
DIARRHEA: 0
NERVOUS/ANXIOUS: 0
SHORTNESS OF BREATH: 0
PALPITATIONS: 0
JOINT SWELLING: 0
HEARTBURN: 0

## 2021-09-14 ENCOUNTER — OFFICE VISIT (OUTPATIENT)
Dept: INTERNAL MEDICINE | Facility: CLINIC | Age: 66
End: 2021-09-14
Payer: COMMERCIAL

## 2021-09-14 VITALS
HEART RATE: 62 BPM | OXYGEN SATURATION: 99 % | SYSTOLIC BLOOD PRESSURE: 124 MMHG | TEMPERATURE: 97.6 F | WEIGHT: 226 LBS | DIASTOLIC BLOOD PRESSURE: 78 MMHG | BODY MASS INDEX: 33.86 KG/M2 | RESPIRATION RATE: 18 BRPM

## 2021-09-14 DIAGNOSIS — M25.511 CHRONIC RIGHT SHOULDER PAIN: ICD-10-CM

## 2021-09-14 DIAGNOSIS — E55.9 VITAMIN D DEFICIENCY: ICD-10-CM

## 2021-09-14 DIAGNOSIS — Z13.220 SCREENING FOR CHOLESTEROL LEVEL: ICD-10-CM

## 2021-09-14 DIAGNOSIS — Z00.00 ROUTINE HISTORY AND PHYSICAL EXAMINATION OF ADULT: Primary | ICD-10-CM

## 2021-09-14 DIAGNOSIS — Z23 NEED FOR VACCINATION: ICD-10-CM

## 2021-09-14 DIAGNOSIS — Z13.1 SCREENING FOR DIABETES MELLITUS: ICD-10-CM

## 2021-09-14 DIAGNOSIS — G89.29 CHRONIC RIGHT SHOULDER PAIN: ICD-10-CM

## 2021-09-14 PROBLEM — C50.919 BREAST CANCER (H): Status: RESOLVED | Noted: 2018-12-14 | Resolved: 2021-09-14

## 2021-09-14 PROBLEM — M79.671 RIGHT FOOT PAIN: Status: RESOLVED | Noted: 2017-05-12 | Resolved: 2021-09-14

## 2021-09-14 PROBLEM — Z85.3 HISTORY OF BREAST CANCER: Status: ACTIVE | Noted: 2018-01-01

## 2021-09-14 LAB — DEPRECATED CALCIDIOL+CALCIFEROL SERPL-MC: 43 UG/L (ref 20–75)

## 2021-09-14 PROCEDURE — 82306 VITAMIN D 25 HYDROXY: CPT | Performed by: INTERNAL MEDICINE

## 2021-09-14 PROCEDURE — 36415 COLL VENOUS BLD VENIPUNCTURE: CPT | Performed by: INTERNAL MEDICINE

## 2021-09-14 PROCEDURE — 80053 COMPREHEN METABOLIC PANEL: CPT | Performed by: INTERNAL MEDICINE

## 2021-09-14 PROCEDURE — 90471 IMMUNIZATION ADMIN: CPT | Performed by: INTERNAL MEDICINE

## 2021-09-14 PROCEDURE — 80061 LIPID PANEL: CPT | Performed by: INTERNAL MEDICINE

## 2021-09-14 PROCEDURE — 90750 HZV VACC RECOMBINANT IM: CPT | Performed by: INTERNAL MEDICINE

## 2021-09-14 PROCEDURE — 99396 PREV VISIT EST AGE 40-64: CPT | Mod: 25 | Performed by: INTERNAL MEDICINE

## 2021-09-14 NOTE — PATIENT INSTRUCTIONS
Shingrix #1 today; #2 in 2-6 months (in any pharmacy).    Fasting labs today.    You will be contacted to schedule an evaluation of your right shoulder (suspect adhesive capsulitis).

## 2021-09-14 NOTE — PROGRESS NOTES
ASSESSMENT/PLAN                                                       (Z00.00) Routine history and physical examination of adult  (primary encounter diagnosis)  Comment: PMH, PSH, FH, SH, medications, allergies, immunizations, and preventative health measures reviewed and updated as appropriate.  Plan: see below for plans.      (Z23) Need for vaccination  Plan: Shingrix #1 given today.     (Z13.220) Screening for cholesterol level  (Z13.1) Screening for diabetes mellitus  (E55.9) Vitamin D deficiency  Plan: fasting labs today.     (M25.511,  G89.29) Chronic right shoulder pain  Plan: referred to sports medicine for further evaluation - patient will be contacted to schedule.      Samina Yen MD   [address] [address]  T: [phone], F: [phone]    SUBJECTIVE                                                      Ila Pierre is a very pleasant 65 year old female who presents for a physical.    ROS:  Constitutional: no unintentional weight loss or gain reported; no fevers, chills, or sweats reported  Cardiovascular: no chest pain, palpitations, or edema reported  Respiratory: no cough, wheezing, shortness of breath, or dyspnea on exertion reported  Gastrointestinal: no nausea, vomiting, constipation, diarrhea, or abdominal pain reported  Genitourinary: no urinary frequency, urgency, dysuria, or hematuria reported  Integumentary: no rash or pruritus reported  Musculoskeletal: right shoulder pain  Neurologic: no focal weakness, numbness, or tingling reported  Hematologic: no easy bruising or bleeding reported  Endocrine: no heat or cold intolerance reported; no polyuria or polydipsia reported  Psychiatric: no anxiety or depression reported    Past Medical History:   Diagnosis Date     Benign essential hypertension      History of breast cancer 2018    grade 1 invasive ductal CA right breast, multifocal associated with DCIS, pT1a, pN0, cM0, hormone receptor  strongly positive, HER2 negative with Oncotype DX score of 10; s/p mastectomy; intolerant of hormone therapy     Obesity (BMI 30-39.9)      PONV (postoperative nausea and vomiting)      Past Surgical History:   Procedure Laterality Date     DISSECT LYMPH NODE AXILLA Right 2018    Procedure: AXILLARY NODE DISECTION ( COLT );  Surgeon: Adolfo Perales MD;  Location: [facility] OR     HYSTERECTOMY, PAP NO LONGER INDICATED      TAHBSO for endometriosis     INSERT TISSUE EXPANDER BREAST Bilateral 2018    Procedure: INSERT RIGHT  TISSUE EXPANDER BREAST ( LASHAY );  Surgeon: Mio Rodríguez MD;  Location: [facility] OR     MASTECTOMY SIMPLE, SENTINEL NODE, COMBINED Right 2018    Procedure: RIGHT MASTECTOMY WITH RIGHT SENTINEL NODE BIOPSY (  COLT );  Surgeon: Adolfo Perales MD;  Location: [facility] OR     TONSILLECTOMY & ADENOIDECTOMY       TUBAL LIGATION       Family History   Problem Relation Age of Onset     Osteoporosis Mother      Breast Cancer Mother      Prostate Cancer Father      Breast Cancer Sister      Hyperlipidemia Brother      Diabetes Type 2  No family hx of      Myocardial Infarction No family hx of      Cerebrovascular Disease No family hx of      Coronary Artery Disease Early Onset No family hx of      Colon Cancer No family hx of      Ovarian Cancer No family hx of      Social History     Socioeconomic History     Marital status: Single     Spouse name: Not on file     Number of children: Not on file     Years of education: Not on file     Highest education level: Not on file   Occupational History     Occupation: Retired - customer service   Tobacco Use     Smoking status: Former Smoker     Years: 20.00     Quit date: 10/19/1997     Years since quittin.9     Smokeless tobacco: Never Used     Tobacco comment: social smoking only   Substance and Sexual Activity     Alcohol use: Yes     Comment: 3 beers - 3 times a week; 2 glasses wine - 2 times a week     Drug use: No     Sexual activity:  Yes     Partners: Male   Other Topics Concern     Parent/sibling w/ CABG, MI or angioplasty before 65F 55M? No   Social History Narrative    In a relationship.    No kids.     No formal exercise.      Allergies   Allergen Reactions     Codeine Sulfate Diarrhea     Penicillins Unknown     as a child     Current Outpatient Medications   Medication Sig     atenolol (TENORMIN) 50 MG tablet Take 1 tablet (50 mg) by mouth daily     Cholecalciferol (VITAMIN D3 PO) Take 5,000 Units by mouth every evening Takes at 18:00     l-tyrosine 500 MG TABS Take 2 tablets by mouth daily.     Omega-3 Fatty Acids (FISH OIL PO) Take 600 mg by mouth every evening Takes at 18:00     Probiotic Product (PROBIOTIC DAILY PO) Take 1 capsule by mouth every evening Acidophilus with goats milk 343 mg- 10 mg.  Takes at 18:00     Immunization History   Administered Date(s) Administered     COVID-19,PF,Pfizer 04/21/2021, 05/12/2021     Influenza (IIV3) PF 10/23/2012, 12/13/2013, 10/01/2014     Influenza Vaccine IM > 6 months Valent IIV4 (Alfuria,Fluzone) 10/01/2019, 09/28/2020     TD (ADULT, 7+) 05/12/2017     Tdap (Adacel,Boostrix) 01/01/2007     PREVENTATIVE HEALTH                                                      BMI: obese  Blood pressure: well-controlled on current regimen   Breast CA screening: up to date   Cervical CA screening: n/a   Colon CA screening: up to date   Lung CA screening: patient does not meet screening criteria  Dexa: up to date   Screening cholesterol: DUE  Screening diabetes: DUE  STD testing: no risk factors present  Alcohol misuse screening: alcohol use reviewed - no intervention indicated at this time  Immunizations: reviewed; Shingrix series DUE    OBJECTIVE                                                      /78 (BP Location: Left arm, Patient Position: Chair, Cuff Size: Adult Large)   Pulse 62   Temp 97.6  F (36.4  C) (Temporal)   Resp 18   Wt 102.5 kg (226 lb)   SpO2 99%   BMI 33.86 kg/m     Constitutional: well-appearing  Head, Ears, and Eyes: normocephalic; normal external auditory canal and pinna; tympanic membranes visualized and normal; normal lids and conjunctivae  Neck: supple, symmetric, no thyromegaly or lymphadenopathy  Respiratory: normal respiratory effort; clear to auscultation bilaterally  Cardiovascular: regular rate and rhythm; no edema  Gastrointestinal: soft, non-tender, and non-distended; no organomegaly or masses  Musculoskeletal: normal gait and station  Psych: normal judgment and insight; normal mood and affect; recent and remote memory intact    ---  (Note was completed, in part, with [employer] voice-recognition software. Documentation was reviewed, but some grammatical, spelling, and word errors may remain.)

## 2021-09-15 LAB
ALBUMIN SERPL-MCNC: 3.5 G/DL (ref 3.4–5)
ALP SERPL-CCNC: 48 U/L (ref 40–150)
ALT SERPL W P-5'-P-CCNC: 18 U/L (ref 0–50)
ANION GAP SERPL CALCULATED.3IONS-SCNC: 4 MMOL/L (ref 3–14)
AST SERPL W P-5'-P-CCNC: 20 U/L (ref 0–45)
BILIRUB SERPL-MCNC: 0.5 MG/DL (ref 0.2–1.3)
BUN SERPL-MCNC: 11 MG/DL (ref 7–30)
CALCIUM SERPL-MCNC: 8.7 MG/DL (ref 8.5–10.1)
CHLORIDE BLD-SCNC: 109 MMOL/L (ref 94–109)
CHOLEST SERPL-MCNC: 185 MG/DL
CO2 SERPL-SCNC: 25 MMOL/L (ref 20–32)
CREAT SERPL-MCNC: 0.78 MG/DL (ref 0.52–1.04)
FASTING STATUS PATIENT QL REPORTED: YES
GFR SERPL CREATININE-BSD FRML MDRD: 81 ML/MIN/1.73M2
GLUCOSE BLD-MCNC: 89 MG/DL (ref 70–99)
HDLC SERPL-MCNC: 78 MG/DL
LDLC SERPL CALC-MCNC: 91 MG/DL
NONHDLC SERPL-MCNC: 107 MG/DL
POTASSIUM BLD-SCNC: 4 MMOL/L (ref 3.4–5.3)
PROT SERPL-MCNC: 6.9 G/DL (ref 6.8–8.8)
SODIUM SERPL-SCNC: 138 MMOL/L (ref 133–144)
TRIGL SERPL-MCNC: 78 MG/DL

## 2021-09-21 DIAGNOSIS — I10 ESSENTIAL HYPERTENSION WITH GOAL BLOOD PRESSURE LESS THAN 140/90: ICD-10-CM

## 2021-09-21 RX ORDER — ATENOLOL 50 MG/1
50 TABLET ORAL DAILY
Qty: 90 TABLET | Refills: 3 | Status: SHIPPED | OUTPATIENT
Start: 2021-09-21 | End: 2022-09-08

## 2022-04-17 ENCOUNTER — HEALTH MAINTENANCE LETTER (OUTPATIENT)
Age: 67
End: 2022-04-17

## 2022-04-28 ENCOUNTER — ANCILLARY PROCEDURE (OUTPATIENT)
Dept: MAMMOGRAPHY | Facility: CLINIC | Age: 67
End: 2022-04-28
Attending: INTERNAL MEDICINE
Payer: COMMERCIAL

## 2022-04-28 DIAGNOSIS — Z12.31 BREAST CANCER SCREENING BY MAMMOGRAM: ICD-10-CM

## 2022-04-28 PROCEDURE — 77063 BREAST TOMOSYNTHESIS BI: CPT | Mod: TC | Performed by: RADIOLOGY

## 2022-04-28 PROCEDURE — 77067 SCR MAMMO BI INCL CAD: CPT | Mod: TC | Performed by: RADIOLOGY

## 2022-09-08 DIAGNOSIS — I10 ESSENTIAL HYPERTENSION WITH GOAL BLOOD PRESSURE LESS THAN 140/90: ICD-10-CM

## 2022-09-08 RX ORDER — ATENOLOL 50 MG/1
50 TABLET ORAL DAILY
Qty: 90 TABLET | Refills: 0 | Status: SHIPPED | OUTPATIENT
Start: 2022-09-08 | End: 2022-11-23

## 2022-10-23 ENCOUNTER — HEALTH MAINTENANCE LETTER (OUTPATIENT)
Age: 67
End: 2022-10-23

## 2022-11-23 ENCOUNTER — OFFICE VISIT (OUTPATIENT)
Dept: FAMILY MEDICINE | Facility: CLINIC | Age: 67
End: 2022-11-23
Payer: COMMERCIAL

## 2022-11-23 VITALS
HEART RATE: 63 BPM | RESPIRATION RATE: 12 BRPM | SYSTOLIC BLOOD PRESSURE: 122 MMHG | OXYGEN SATURATION: 99 % | HEIGHT: 68 IN | TEMPERATURE: 97.2 F | BODY MASS INDEX: 34.4 KG/M2 | WEIGHT: 227 LBS | DIASTOLIC BLOOD PRESSURE: 78 MMHG

## 2022-11-23 DIAGNOSIS — I10 BENIGN ESSENTIAL HYPERTENSION: ICD-10-CM

## 2022-11-23 DIAGNOSIS — E55.9 VITAMIN D DEFICIENCY: ICD-10-CM

## 2022-11-23 DIAGNOSIS — Z13.6 SCREENING FOR CARDIOVASCULAR CONDITION: ICD-10-CM

## 2022-11-23 DIAGNOSIS — E66.09 CLASS 1 OBESITY DUE TO EXCESS CALORIES WITH SERIOUS COMORBIDITY AND BODY MASS INDEX (BMI) OF 34.0 TO 34.9 IN ADULT: ICD-10-CM

## 2022-11-23 DIAGNOSIS — Z00.00 ROUTINE GENERAL MEDICAL EXAMINATION AT A HEALTH CARE FACILITY: Primary | ICD-10-CM

## 2022-11-23 DIAGNOSIS — L71.9 ROSACEA: ICD-10-CM

## 2022-11-23 DIAGNOSIS — E66.811 CLASS 1 OBESITY DUE TO EXCESS CALORIES WITH SERIOUS COMORBIDITY AND BODY MASS INDEX (BMI) OF 34.0 TO 34.9 IN ADULT: ICD-10-CM

## 2022-11-23 DIAGNOSIS — Z23 HIGH PRIORITY FOR 2019-NCOV VACCINE: ICD-10-CM

## 2022-11-23 DIAGNOSIS — B00.9 RECURRENT HERPES SIMPLEX: ICD-10-CM

## 2022-11-23 LAB
ALBUMIN SERPL BCG-MCNC: 4 G/DL (ref 3.5–5.2)
ALP SERPL-CCNC: 52 U/L (ref 35–104)
ALT SERPL W P-5'-P-CCNC: 10 U/L (ref 10–35)
ANION GAP SERPL CALCULATED.3IONS-SCNC: 10 MMOL/L (ref 7–15)
AST SERPL W P-5'-P-CCNC: 25 U/L (ref 10–35)
BILIRUB SERPL-MCNC: 0.6 MG/DL
BUN SERPL-MCNC: 9.7 MG/DL (ref 8–23)
CALCIUM SERPL-MCNC: 9.6 MG/DL (ref 8.8–10.2)
CHLORIDE SERPL-SCNC: 105 MMOL/L (ref 98–107)
CHOLEST SERPL-MCNC: 195 MG/DL
CREAT SERPL-MCNC: 0.69 MG/DL (ref 0.51–0.95)
DEPRECATED HCO3 PLAS-SCNC: 25 MMOL/L (ref 22–29)
GFR SERPL CREATININE-BSD FRML MDRD: >90 ML/MIN/1.73M2
GLUCOSE SERPL-MCNC: 99 MG/DL (ref 70–99)
HDLC SERPL-MCNC: 91 MG/DL
LDLC SERPL CALC-MCNC: 88 MG/DL
NONHDLC SERPL-MCNC: 104 MG/DL
POTASSIUM SERPL-SCNC: 4.6 MMOL/L (ref 3.4–5.3)
PROT SERPL-MCNC: 6.8 G/DL (ref 6.4–8.3)
SODIUM SERPL-SCNC: 140 MMOL/L (ref 136–145)
TRIGL SERPL-MCNC: 81 MG/DL

## 2022-11-23 PROCEDURE — 36415 COLL VENOUS BLD VENIPUNCTURE: CPT | Performed by: PHYSICIAN ASSISTANT

## 2022-11-23 PROCEDURE — 80061 LIPID PANEL: CPT | Performed by: PHYSICIAN ASSISTANT

## 2022-11-23 PROCEDURE — 99214 OFFICE O/P EST MOD 30 MIN: CPT | Mod: 25 | Performed by: PHYSICIAN ASSISTANT

## 2022-11-23 PROCEDURE — 82306 VITAMIN D 25 HYDROXY: CPT | Performed by: PHYSICIAN ASSISTANT

## 2022-11-23 PROCEDURE — 91312 COVID-19 VACCINE BIVALENT BOOSTER 12+ (PFIZER): CPT | Performed by: PHYSICIAN ASSISTANT

## 2022-11-23 PROCEDURE — 0124A COVID-19 VACCINE BIVALENT BOOSTER 12+ (PFIZER): CPT | Performed by: PHYSICIAN ASSISTANT

## 2022-11-23 PROCEDURE — 80053 COMPREHEN METABOLIC PANEL: CPT | Performed by: PHYSICIAN ASSISTANT

## 2022-11-23 PROCEDURE — G0438 PPPS, INITIAL VISIT: HCPCS | Performed by: PHYSICIAN ASSISTANT

## 2022-11-23 RX ORDER — METRONIDAZOLE 7.5 MG/G
GEL TOPICAL 2 TIMES DAILY
Qty: 45 G | Refills: 11 | Status: SHIPPED | OUTPATIENT
Start: 2022-11-23

## 2022-11-23 RX ORDER — ATENOLOL 50 MG/1
50 TABLET ORAL DAILY
Qty: 90 TABLET | Refills: 3 | Status: SHIPPED | OUTPATIENT
Start: 2022-11-23 | End: 2023-11-29

## 2022-11-23 RX ORDER — ACYCLOVIR 400 MG/1
TABLET ORAL
Qty: 15 TABLET | Refills: 3 | Status: SHIPPED | OUTPATIENT
Start: 2022-11-23

## 2022-11-23 ASSESSMENT — ENCOUNTER SYMPTOMS
HEARTBURN: 0
BREAST MASS: 0
HEMATOCHEZIA: 0
FREQUENCY: 0
FEVER: 0
ARTHRALGIAS: 1
MYALGIAS: 0
PARESTHESIAS: 0
JOINT SWELLING: 0
COUGH: 0
CONSTIPATION: 0
NAUSEA: 0
HEMATURIA: 0
NERVOUS/ANXIOUS: 0
ABDOMINAL PAIN: 0
PALPITATIONS: 0
SHORTNESS OF BREATH: 0
CHILLS: 0
DIARRHEA: 0
DIZZINESS: 0
EYE PAIN: 0
HEADACHES: 0
SORE THROAT: 0
WEAKNESS: 0
DYSURIA: 0

## 2022-11-23 ASSESSMENT — ACTIVITIES OF DAILY LIVING (ADL): CURRENT_FUNCTION: NO ASSISTANCE NEEDED

## 2022-11-23 ASSESSMENT — PAIN SCALES - GENERAL: PAINLEVEL: NO PAIN (0)

## 2022-11-23 NOTE — PROGRESS NOTES
"SUBJECTIVE:   Winnie is a 67 year old who presents for Preventive Visit.    Patient has been advised of split billing requirements and indicates understanding: Yes  Are you in the first 12 months of your Medicare coverage?  No    Healthy Habits:     In general, how would you rate your overall health?  Good    Frequency of exercise:  1 day/week    Duration of exercise:  N/A    Do you usually eat at least 4 servings of fruit and vegetables a day, include whole grains    & fiber and avoid regularly eating high fat or \"junk\" foods?  Yes    Taking medications regularly:  Yes    Medication side effects:  Not applicable    Ability to successfully perform activities of daily living:  No assistance needed    Home Safety:  No safety concerns identified    Hearing Impairment:  Need to ask people to speak up or repeat themselves    In the past 6 months, have you been bothered by leaking of urine?  No    In general, how would you rate your overall mental or emotional health?  Good      PHQ-2 Total Score: 0    Additional concerns today:  Yes    - History rosacea, seems to be worsening recently. Using daily moisturizer and practices good sun protection.    Have you ever done Advance Care Planning? (For example, a Health Directive, POLST, or a discussion with a medical provider or your loved ones about your wishes): Yes, patient states has an Advance Care Planning document and will bring a copy to the clinic.     Fall risk  Fallen 2 or more times in the past year?: No  Any fall with injury in the past year?: No    Cognitive Screening   1) Repeat 3 items (Leader, Season, Table)    2) Clock draw: NORMAL  3) 3 item recall: Recalls 2 objects   Results: NORMAL clock, 1-2 items recalled: COGNITIVE IMPAIRMENT LESS LIKELY    Mini-CogTM Copyright ANNALISA Cobos. Licensed by the author for use in [facility]; reprinted with permission (rubens@.[facility]). All rights reserved.        Reviewed and updated as needed this visit by clinical " staff   Tobacco  Allergies  Meds  Problems  Med Hx  Surg Hx  Fam Hx          Reviewed and updated as needed this visit by Provider   Tobacco  Allergies  Meds  Problems  Med Hx  Surg Hx  Fam Hx         Social History     Tobacco Use     Smoking status: Former     Years: 20.00     Types: Cigarettes     Quit date: 10/19/1997     Years since quittin.1     Smokeless tobacco: Never     Tobacco comments:     social smoking only   Substance Use Topics     Alcohol use: Yes     Comment: 3 beers - 3 times a week; 2 glasses wine - 2 times a week         Alcohol Use 2022   Prescreen: >3 drinks/day or >7 drinks/week? No   Prescreen: >3 drinks/day or >7 drinks/week? -           Hypertension Follow-up      Do you check your blood pressure regularly outside of the clinic? No     Are you following a low salt diet? Yes    Are your blood pressures ever more than 140 on the top number (systolic) OR more   than 90 on the bottom number (diastolic), for example 140/90? No      Current providers sharing in care for this patient include:   Patient Care Team:  Samina Yen MD as PCP - General (Internal Medicine)  Samina Yen MD as Assigned PCP    The following health maintenance items are reviewed in Epic and correct as of today:  Health Maintenance   Topic Date Due     Pneumococcal Vaccine: 65+ Years (1 - PCV) Never done     INFLUENZA VACCINE (1) 2022     MEDICARE ANNUAL WELLNESS VISIT  2022     LIPID  2022     CREATININE  2022     MAMMO SCREENING  2023     COLORECTAL CANCER SCREENING  10/05/2023     ANNUAL REVIEW OF HM ORDERS  2023     FALL RISK ASSESSMENT  2023     DEXA  2024     DTAP/TDAP/TD IMMUNIZATION (5 - Td or Tdap) 2027     ADVANCE CARE PLANNING  2027     HEPATITIS C SCREENING  Completed     PHQ-2 (once per calendar year)  Completed     ZOSTER IMMUNIZATION  Completed     COVID-19 Vaccine  Completed     IPV IMMUNIZATION  Aged Out      MENINGITIS IMMUNIZATION  Aged Out     PAP  Discontinued     BP Readings from Last 3 Encounters:   22 122/78   21 124/78   21 (!) 166/88    Wt Readings from Last 3 Encounters:   22 103 kg (227 lb)   21 102.5 kg (226 lb)   21 104.2 kg (229 lb 11.2 oz)                  Patient Active Problem List   Diagnosis     Benign essential hypertension     History of breast cancer     Class 1 obesity due to excess calories with serious comorbidity and body mass index (BMI) of 34.0 to 34.9 in adult     PONV (postoperative nausea and vomiting)     Past Surgical History:   Procedure Laterality Date     DISSECT LYMPH NODE AXILLA Right 2018    Procedure: AXILLARY NODE DISECTION ( COLT );  Surgeon: Adolfo Perales MD;  Location: [facility] OR     HYSTERECTOMY, PAP NO LONGER INDICATED      TAHBSO for endometriosis     INSERT TISSUE EXPANDER BREAST Bilateral 2018    Procedure: INSERT RIGHT  TISSUE EXPANDER BREAST ( LASHAY );  Surgeon: Mio Rodríguez MD;  Location: [facility] OR     MASTECTOMY SIMPLE, SENTINEL NODE, COMBINED Right 2018    Procedure: RIGHT MASTECTOMY WITH RIGHT SENTINEL NODE BIOPSY (  COLT );  Surgeon: Adolfo Perales MD;  Location: [facility] OR     TONSILLECTOMY & ADENOIDECTOMY       TUBAL LIGATION         Social History     Tobacco Use     Smoking status: Former     Years: 20.00     Types: Cigarettes     Quit date: 10/19/1997     Years since quittin.1     Smokeless tobacco: Never     Tobacco comments:     social smoking only   Substance Use Topics     Alcohol use: Yes     Comment: 3 beers - 3 times a week; 2 glasses wine - 2 times a week     Family History   Problem Relation Age of Onset     Osteoporosis Mother      Breast Cancer Mother      Prostate Cancer Father      Breast Cancer Sister      Hyperlipidemia Brother      Diabetes Type 2  No family hx of      Myocardial Infarction No family hx of      Cerebrovascular Disease No family hx of      Coronary Artery Disease  "Early Onset No family hx of      Colon Cancer No family hx of      Ovarian Cancer No family hx of            FHS-7:   Breast CA Risk Assessment (FHS-7) 4/28/2022   Did any of your first-degree relatives have breast or ovarian cancer? Yes   Did any of your relatives have bilateral breast cancer? No   Did any man in your family have breast cancer? No   Did any woman in your family have breast and ovarian cancer? No   Did any woman in your family have breast cancer before age 50 y? No   Do you have 2 or more relatives with breast and/or ovarian cancer? No   Do you have 2 or more relatives with breast and/or bowel cancer? No       Mammogram Screening: Recommended mammography every 1-2 years with patient discussion and risk factor consideration  Pertinent mammograms are reviewed under the imaging tab.    Review of Systems   Constitutional: Negative for chills and fever.   HENT: Negative for congestion, ear pain, hearing loss and sore throat.    Eyes: Negative for pain and visual disturbance.   Respiratory: Negative for cough and shortness of breath.    Cardiovascular: Negative for chest pain, palpitations and peripheral edema.   Gastrointestinal: Negative for abdominal pain, constipation, diarrhea, heartburn, hematochezia and nausea.   Breasts:  Negative for tenderness, breast mass and discharge.   Genitourinary: Negative for dysuria, frequency, genital sores, hematuria, pelvic pain, urgency, vaginal bleeding and vaginal discharge.   Musculoskeletal: Positive for arthralgias. Negative for joint swelling and myalgias.   Skin: Negative for rash.   Neurological: Negative for dizziness, weakness, headaches and paresthesias.   Psychiatric/Behavioral: Negative for mood changes. The patient is not nervous/anxious.          OBJECTIVE:   /78   Pulse 63   Temp 97.2  F (36.2  C) (Tympanic)   Resp 12   Ht 1.73 m (5' 8.11\")   Wt 103 kg (227 lb)   SpO2 99%   BMI 34.40 kg/m   Estimated body mass index is 34.4 kg/m  as " "calculated from the following:    Height as of this encounter: 1.73 m (5' 8.11\").    Weight as of this encounter: 103 kg (227 lb).  Physical Exam  GENERAL APPEARANCE: healthy, alert and no distress  EYES: Eyes grossly normal to inspection, PERRL and conjunctivae and sclerae normal  HENT: ear canals and TM's normal, nose and mouth without ulcers or lesions, oropharynx clear and oral mucous membranes moist  NECK: no adenopathy, no asymmetry, masses, or scars and thyroid normal to palpation  RESP: lungs clear to auscultation - no rales, rhonchi or wheezes  CV: regular rate and rhythm, normal S1 S2, no S3 or S4, no murmur, click or rub, no peripheral edema and peripheral pulses strong  ABDOMEN: soft, nontender, no hepatosplenomegaly, no masses and bowel sounds normal  MS: no musculoskeletal defects are noted and gait is age appropriate without ataxia  SKIN: no suspicious lesions. Erythematous patches along forehead and bilat cheeks with scattered papules.  NEURO: Normal strength and tone, sensory exam grossly normal, mentation intact and speech normal  PSYCH: mentation appears normal and affect normal/bright      ASSESSMENT / PLAN:       ICD-10-CM    1. Routine general medical examination at a health care facility  Z00.00       2. Rosacea  L71.9 metroNIDAZOLE (METROGEL) 0.75 % external gel      3. Benign essential hypertension  I10 atenolol (TENORMIN) 50 MG tablet     Comprehensive metabolic panel (BMP + Alb, Alk Phos, ALT, AST, Total. Bili, TP)     Comprehensive metabolic panel (BMP + Alb, Alk Phos, ALT, AST, Total. Bili, TP)      4. Recurrent herpes simplex  B00.9 acyclovir (ZOVIRAX) 400 MG tablet     Comprehensive metabolic panel (BMP + Alb, Alk Phos, ALT, AST, Total. Bili, TP)     Comprehensive metabolic panel (BMP + Alb, Alk Phos, ALT, AST, Total. Bili, TP)      5. Class 1 obesity due to excess calories with serious comorbidity and body mass index (BMI) of 34.0 to 34.9 in adult  E66.09     Z68.34       6. Vitamin D " "deficiency  E55.9 Vitamin D Deficiency     Vitamin D Deficiency      7. High priority for 2019-nCoV vaccine  Z23 COVID-19,PF,PFIZER BOOSTER BIVALENT 12+Yrs      8. Screening for cardiovascular condition  Z13.6 Lipid panel reflex to direct LDL Fasting     Lipid panel reflex to direct LDL Fasting        - Recommend application of metrogel BID to help with rosacea symptoms. Avoid known triggers. Continue to practice good hygiene and sun protection.  - HTN well-controlled on current regimen, continue without change  - Refills for acyclovir given as requested, uses prn cold sores.      COUNSELING:  Reviewed preventive health counseling, as reflected in patient instructions       Immunizations    Vaccinated for: Covid-19            BMI:   Estimated body mass index is 34.4 kg/m  as calculated from the following:    Height as of this encounter: 1.73 m (5' 8.11\").    Weight as of this encounter: 103 kg (227 lb).   Weight management plan: Discussed healthy diet and exercise guidelines      She reports that she quit smoking about 25 years ago. She has never used smokeless tobacco.      Appropriate preventive services were discussed with this patient, including applicable screening as appropriate for cardiovascular disease, diabetes, osteopenia/osteoporosis, and glaucoma.  As appropriate for age/gender, discussed screening for colorectal cancer, prostate cancer, breast cancer, and cervical cancer. Checklist reviewing preventive services available has been given to the patient.    Reviewed patients plan of care and provided an AVS. The Basic Care Plan (routine screening as documented in Health Maintenance) for Ila meets the Care Plan requirement. This Care Plan has been established and reviewed with the Patient.      Charla Velasquez PA-C  [facility]    Identified Health Risks:  "

## 2022-11-23 NOTE — PATIENT INSTRUCTIONS
Preventive Health Recommendations    See your health care provider every year to    Review health changes.     Discuss preventive care.      Review your medicines if your doctor has prescribed any.      You no longer need a yearly Pap test unless you've had an abnormal Pap test in the past 10 years. If you have vaginal symptoms, such as bleeding or discharge, be sure to talk with your provider about a Pap test.      Every 1 to 2 years, have a mammogram.  If you are over 69, talk with your health care provider about whether or not you want to continue having screening mammograms.      Every 10 years, have a colonoscopy. Or, have a yearly FIT test (stool test). These exams will check for colon cancer.       Have a cholesterol test every 5 years, or more often if your doctor advises it.       Have a diabetes test (fasting glucose) every three years. If you are at risk for diabetes, you should have this test more often.       At age 65, have a bone density scan (DEXA) to check for osteoporosis (brittle bone disease).    Shots:    Get a flu shot each year.    Get a tetanus shot every 10 years.    Talk to your doctor about your pneumonia vaccines. There are now two you should receive - Pneumovax (PPSV 23) and Prevnar (PCV 13).    Talk to your pharmacist about the shingles vaccine.    Talk to your doctor about the hepatitis B vaccine.    Nutrition:     Eat at least 5 servings of fruits and vegetables each day.      Eat whole-grain bread, whole-wheat pasta and brown rice instead of white grains and rice.      Get adequate about Calcium and Vitamin D.     Lifestyle    Exercise at least 150 minutes a week (30 minutes a day, 5 days a week). This will help you control your weight and prevent disease.      Limit alcohol to one drink per day.      No smoking.       Wear sunscreen to prevent skin cancer.       See your dentist twice a year for an exam and cleaning.      See your eye doctor every 1 to 2 years to screen for  conditions such as glaucoma, macular degeneration, cataracts, etc.    Personalized Prevention Plan  You are due for the preventive services outlined below.  Your care team is available to assist you in scheduling these services.  If you have already completed any of these items, please share that information with your care team to update in your medical record.    Health Maintenance Due   Topic Date Due     Pneumococcal Vaccine (1 - PCV) Never done     COVID-19 Vaccine (4 - Booster for Pfizer series) 01/31/2022     Flu Vaccine (1) 09/01/2022     Annual Wellness Visit  09/14/2022     Cholesterol Lab  09/14/2022     Creatinine Lab  09/14/2022     ANNUAL REVIEW OF HM ORDERS  09/14/2022

## 2022-11-25 LAB — DEPRECATED CALCIDIOL+CALCIFEROL SERPL-MC: 42 UG/L (ref 20–75)

## 2023-04-13 ENCOUNTER — TELEPHONE (OUTPATIENT)
Dept: INTERNAL MEDICINE | Facility: CLINIC | Age: 68
End: 2023-04-13
Payer: COMMERCIAL

## 2023-04-13 ENCOUNTER — HOSPITAL ENCOUNTER (OUTPATIENT)
Dept: MRI IMAGING | Facility: CLINIC | Age: 68
Discharge: HOME | End: 2023-04-13
Attending: PLASTIC SURGERY | Admitting: PLASTIC SURGERY
Payer: COMMERCIAL

## 2023-04-13 DIAGNOSIS — Z90.11 HISTORY OF RIGHT MASTECTOMY: ICD-10-CM

## 2023-04-13 DIAGNOSIS — Z85.3 HISTORY OF MALIGNANT NEOPLASM OF FEMALE BREAST: ICD-10-CM

## 2023-04-13 PROCEDURE — 77049 MRI BREAST C-+ W/CAD BI: CPT

## 2023-04-13 PROCEDURE — 255N000002 HC RX 255 OP 636: Performed by: PLASTIC SURGERY

## 2023-04-13 PROCEDURE — A9585 GADOBUTROL INJECTION: HCPCS | Performed by: PLASTIC SURGERY

## 2023-04-13 RX ORDER — GADOBUTROL 604.72 MG/ML
10 INJECTION INTRAVENOUS ONCE
Status: COMPLETED | OUTPATIENT
Start: 2023-04-13 | End: 2023-04-13

## 2023-04-13 RX ADMIN — GADOBUTROL 10 ML: 604.72 INJECTION INTRAVENOUS at 09:06

## 2023-05-23 ENCOUNTER — PATIENT OUTREACH (OUTPATIENT)
Dept: CARE COORDINATION | Facility: CLINIC | Age: 68
End: 2023-05-23
Payer: COMMERCIAL

## 2023-06-27 ENCOUNTER — NURSE TRIAGE (OUTPATIENT)
Dept: INTERNAL MEDICINE | Facility: CLINIC | Age: 68
End: 2023-06-27

## 2023-06-27 ENCOUNTER — APPOINTMENT (OUTPATIENT)
Dept: CT IMAGING | Facility: CLINIC | Age: 68
End: 2023-06-27
Attending: EMERGENCY MEDICINE
Payer: COMMERCIAL

## 2023-06-27 ENCOUNTER — HOSPITAL ENCOUNTER (EMERGENCY)
Facility: CLINIC | Age: 68
Discharge: HOME | End: 2023-06-27
Attending: EMERGENCY MEDICINE | Admitting: EMERGENCY MEDICINE
Payer: COMMERCIAL

## 2023-06-27 VITALS
BODY MASS INDEX: 31.1 KG/M2 | SYSTOLIC BLOOD PRESSURE: 169 MMHG | HEART RATE: 62 BPM | WEIGHT: 210 LBS | TEMPERATURE: 98 F | DIASTOLIC BLOOD PRESSURE: 85 MMHG | RESPIRATION RATE: 16 BRPM | HEIGHT: 69 IN | OXYGEN SATURATION: 99 %

## 2023-06-27 DIAGNOSIS — S02.2XXA CLOSED FRACTURE OF NASAL BONE, INITIAL ENCOUNTER: Primary | ICD-10-CM

## 2023-06-27 DIAGNOSIS — S01.21XA NASAL LACERATION, INITIAL ENCOUNTER: ICD-10-CM

## 2023-06-27 PROCEDURE — 70450 CT HEAD/BRAIN W/O DYE: CPT

## 2023-06-27 PROCEDURE — 12011 RPR F/E/E/N/L/M 2.5 CM/<: CPT

## 2023-06-27 PROCEDURE — 99284 EMERGENCY DEPT VISIT MOD MDM: CPT | Mod: 25

## 2023-06-27 PROCEDURE — 70486 CT MAXILLOFACIAL W/O DYE: CPT

## 2023-06-27 RX ORDER — LIDOCAINE HYDROCHLORIDE AND EPINEPHRINE 10; 10 MG/ML; UG/ML
1 INJECTION, SOLUTION INFILTRATION; PERINEURAL ONCE
Status: DISCONTINUED | OUTPATIENT
Start: 2023-06-27 | End: 2023-06-27 | Stop reason: HOSPADM

## 2023-06-27 RX ORDER — IOPAMIDOL 755 MG/ML
75 INJECTION, SOLUTION INTRAVASCULAR ONCE
Status: DISCONTINUED | OUTPATIENT
Start: 2023-06-27 | End: 2023-06-27

## 2023-06-27 ASSESSMENT — ACTIVITIES OF DAILY LIVING (ADL): ADLS_ACUITY_SCORE: 35

## 2023-06-27 NOTE — TELEPHONE ENCOUNTER
"1. APPEARANCE of INJURY: \"What does the injury look like?\"       One inch long laceration on top portion of the nose. Looks like there is some hollow space inside.   2. SIZE: \"How large is the cut?\"       One inch   3. BLEEDING: \"Is it bleeding now?\" If Yes, ask: \"Is it difficult to stop?\"       It was bleeding hard at first per patient and she was able to stop it with ice and bandage.   4. LOCATION: \"Where is the injury located?\"       Top of the nose.  5. ONSET: \"How long ago did the injury occur?\"       8:30 AM, one hour ago.   6. MECHANISM: \"Tell me how it happened.\"       Pt slipped and fell on the rug outside. Pt heart something crack in her nose at the time of the fall.   7. TETANUS: \"When was the last tetanus booster?\"      2017     Pt asked if she can be seen at . CHRISTIANO Arreguin was consulted who agreed that pt should go to ER. Pt will need stitches and a scan.     Reason for Disposition    Skin is split open or gaping (or length > 1/2 inch or 12 mm on the skin, 1/4 inch or 6 mm on the face)    Additional Information    Negative: Bleeding and won't stop after 10 minutes of direct pressure (using correct technique)    Negative: Last tetanus shot > 10 years ago and CLEAN cut    Negative: Last tetanus shot > 5 years ago and DIRTY cut    Negative: Deep cut and can see bone or tendons    Negative: Cut over knuckle (MCP joint)    Negative: Sensation of something in the wound (i.e., retained object in wound)    Negative: Dirt in the wound and not removed with 15 minutes of scrubbing    Negative: Wound causes numbness (i.e., loss of sensation)    Negative: Wound causes weakness (i.e., decreased ability to move hand, finger, toe)    Protocols used: CUTS AND [id number]      "

## 2023-06-27 NOTE — ED PROVIDER NOTES
History     Chief Complaint:  Fall     HPI   Ila Pierre is a 67 year old female presents to the emergency department for nose injury after mechanical fall.  Patient states that she was walking in from the back door when she tripped on her foot and face planted onto a carpeted ground.  However at this carpeted area was fairly rough and surface which resulted in her face being scraped along the ground.  Patient has associated nasal bridge laceration and bleeding, so she does not come to the ER for further evaluation.  She denies any loss of consciousness, headache, neck pain, dizziness, numbness/tingling or weakness.  Patient has no other complaints at this time.  She is not on blood thinners.  She is up-to-date on her tetanus vaccinations.    Independent Historian:   None - Patient Only    Review of External Notes:   6/27/2023 telephone nursing triage note    Medications:    amoxicillin-clavulanate (AUGMENTIN) 875-125 MG tablet  acyclovir (ZOVIRAX) 400 MG tablet  atenolol (TENORMIN) 50 MG tablet  Cholecalciferol (VITAMIN D3 PO)  l-tyrosine 500 MG TABS  metroNIDAZOLE (METROGEL) 0.75 % external gel  Omega-3 Fatty Acids (FISH OIL PO)  Probiotic Product (PROBIOTIC DAILY PO)      Past Medical History:    Past Medical History:   Diagnosis Date     Benign essential hypertension      History of breast cancer 2018     Obesity (BMI 30-39.9)      PONV (postoperative nausea and vomiting)        Past Surgical History:    Past Surgical History:   Procedure Laterality Date     DISSECT LYMPH NODE AXILLA Right 12/14/2018    Procedure: AXILLARY NODE DISECTION ( COLT );  Surgeon: Adolfo Perales MD;  Location: [facility] OR     HYSTERECTOMY, PAP NO LONGER INDICATED      TAHBSO for endometriosis     INSERT TISSUE EXPANDER BREAST Bilateral 12/14/2018    Procedure: INSERT RIGHT  TISSUE EXPANDER BREAST ( ARIELECK );  Surgeon: Mio Rodríguez MD;  Location: [facility] OR     MASTECTOMY SIMPLE, SENTINEL NODE, COMBINED Right 12/14/2018  "   Procedure: RIGHT MASTECTOMY WITH RIGHT SENTINEL NODE BIOPSY (  COLT );  Surgeon: Adolfo Perales MD;  Location:  OR     TONSILLECTOMY & ADENOIDECTOMY       TUBAL LIGATION          Physical Exam     Patient Vitals for the past 24 hrs:   BP Temp Temp src Pulse Resp SpO2 Height Weight   06/27/23 1445 (!) 169/85 -- -- 62 -- -- -- --   06/27/23 1130 (!) 187/81 -- -- 61 16 99 % -- --   06/27/23 1109 (!) 214/89 -- -- 57 18 99 % -- --   06/27/23 1001 (!) 171/78 98  F (36.7  C) Oral 59 16 99 % 1.753 m (5' 9\") 95.3 kg (210 lb)      Physical Exam  Constitutional:       Appearance: Normal appearance.      General: Not in acute distress.  HENT:      Head: Normocephalic.  No causey sign.  No raccoon's eyes.     Nose: Midline roughly 2 cm vertical laceration to the nasal bridge with potential tiny area of exposure of potential nasal bone vs fascia.  No septal hematoma.     Face: No additional facial bone tenderness to palpation beyond the nasal bridge.     Mouth: Normal dentition  Eyes:      Extraocular Movements: Extraocular movements intact.      Conjunctiva/sclera: Conjunctivae normal.   Cardiovascular:      Rate and Rhythm: Normal rate and regular rhythm.   Pulmonary:      Effort: Pulmonary effort is normal. No respiratory distress.   Abdominal:      General: Abdomen is flat. There is no distension.   Musculoskeletal:         General: No swelling or deformity.      Cervical back: Normal range of motion. No rigidity.   Skin:     Coloration: Skin is not jaundiced or pale.   Neurological:      General: No focal deficit present.      Mental Status: Alert and oriented to person, place, and time.   Psychiatric:         Mood and Affect: Mood normal.         Behavior: Behavior normal.                Emergency Department Course     Imaging:  CT Facial Bones without Contrast   Final Result   IMPRESSION:   1. Acute mildly displaced, comminuted nasal arch fracture, as   described.   2. No other acute maxillofacial fracture " identified.      RODGER PANIAGUA MD            SYSTEM ID:  [id number]      CT Head w/o Contrast   Final Result   IMPRESSION:      1. No evidence of acute intracranial hemorrhage, mass, or herniation.   2. Mild diffuse parenchymal volume loss and white matter changes   likely due to chronic microvascular ischemic disease.    3. Acute nasal arch fracture. Please see separate report from facial   CT of same day for additional details.         RODGER PANIAGUA MD            SYSTEM ID:  [id number]         Report per radiology    Laboratory:  Labs Ordered and Resulted from Time of ED Arrival to Time of ED Departure - No data to display     Procedures       Laceration Repair      Procedure: Laceration Repair    Indication: Laceration    Consent: Verbal    Location: Nasal Bridge    Length: 1.5 cm    Preparation: Irrigation with Sterile Saline and Pressure device utilized. >1L Sterile normal saline.    Anesthesia/Sedation: Lidocaine with Epinephrine - 1%      Treatment/Exploration: Wound explored, no foreign bodies found     Closure: The wound was closed with one layer. Skin/superficial layer was closed with 5 x 5-0 Fast gut absorbable  using Interrupted sutures.     Patient Status: The patient tolerated the procedure well: Yes. There were no complications.      Emergency Department Course & Assessments:       Interventions:  Medications - No data to display     Independent Interpretation (X-rays, CTs, rhythm strip):  None    Assessment/Consultations/Discussion of Management or Tests:     ED Course as of 06/27/23 2039 Tue Jun 27, 2023   1403 I discussed patient with ENT Dr. Mccray who recommends ED closure with short course of Augmentin and follow-up with ENT in 5 to 7 days.   1438 Confirmed with patient that she has tolerated Augmentin in the past without difficulty.  Additionally, penicillin gives her GI upset, but no hives or anaphylactic reaction. Will discharge to outpatient ENT follow-up. Discussed return precautions/nasal  precautions. Answered all questions. Patient and significant other voiced understanding and agreement with plan.     Social Determinants of Health affecting care:   None    Disposition:  The patient was discharged to home.     Impression & Plan    CMS Diagnoses: None    Medical Decision Makin-year-old female as described above presents to the emergency department for fall with associated midline nasal bridge laceration with potential exposure of underlying nasal bone.  Patient hemodynamically stable at time evaluation.  Afebrile.  Will CT head and CT facial bone for evaluation for acute intracranial bleeding/injuries in addition to facial bone injuries.  Will likely repair laceration in the ED after large-volume washout given deep laceration with questionable exposure of underlying bone vs fascia. She will likely need prophylactic antibiotics at discharge.  Consult ENT.  Discussed care plan with patient who voiced understanding and agreement with plan.  Answered all questions.  Additional work-up and orders as listed in chart.    Please refer to ED course above for details on the patient's treatment course and any changes or updates in care plan beyond my initial evaluation and MDM.    Diagnosis:    ICD-10-CM    1. Closed fracture of nasal bone, initial encounter  S02.2XXA       2. Nasal laceration, initial encounter  S01.21XA            Discharge Medications:  Discharge Medication List as of 2023  2:45 PM      START taking these medications    Details   amoxicillin-clavulanate (AUGMENTIN) 875-125 MG tablet Take 1 tablet by mouth 2 times daily for 5 days, Disp-10 tablet, R-0, E-Prescribe            BLADIMIR JUARES DO  2023   Bladimir Juares DO Yeh, Ferr[place], DO  23

## 2023-06-27 NOTE — DISCHARGE INSTRUCTIONS
Please follow-up with your primary care provider and/or specialist regarding your visit to the ER today.    Please return to the emergency department should you experience any of the symptoms we specifically discussed, including but not limited to recurrence or worsening of your symptoms, or development of any new and concerning symptoms.    Please keep area please continue to apply bacitracin ointment over the area.  Wear hat and avoid excessive UV exposure.  Please avoid blowing your nose or heavy lifting.    Please follow-up with ENT in 5 to 7 days for repeat wound evaluation.

## 2023-07-05 ENCOUNTER — TRANSFERRED RECORDS (OUTPATIENT)
Dept: HEALTH INFORMATION MANAGEMENT | Facility: CLINIC | Age: 68
End: 2023-07-05
Payer: COMMERCIAL

## 2023-08-15 DIAGNOSIS — I10 BENIGN ESSENTIAL HYPERTENSION: ICD-10-CM

## 2023-08-15 RX ORDER — ATENOLOL 50 MG/1
50 TABLET ORAL DAILY
Qty: 90 TABLET | Refills: 3 | OUTPATIENT
Start: 2023-08-15

## 2023-08-17 NOTE — PROGRESS NOTES
[address] [address]  Phone: [phone]  Primary Provider: Aleshia Meléndez  Pre-op Performing Provider: YASIR ALBA      PREOPERATIVE EVALUATION:  Today's date: 4/7/2021    Ila Pierre is a 65 year old female who presents for a preoperative evaluation.    Surgical Information:  Surgery/Procedure: DECOMPRESSION-FORMAINOTOMY L5-S1 LEFT,  SYNOVIAL CYST RESECTION L5-S1 LEFT  Surgery Location: [facility]  Surgeon: Roman  Surgery Date: 04/08/2021  Time of Surgery: 1245  Where patient plans to recover: At home with family  Fax number for surgical facility: [phone]    Type of Anesthesia Anticipated: General    Assessment & Plan     The proposed surgical procedure is considered INTERMEDIATE risk.    Preop general physical exam    - EKG 12-lead complete w/read - Clinics  - CBC with platelets differential  - Basic metabolic panel    Synovial cyst of lumbar facet joint    - EKG 12-lead complete w/read - Clinics  - CBC with platelets differential  - Basic metabolic panel    Lumbar back pain with radiculopathy affecting left lower extremity    - EKG 12-lead complete w/read - Clinics  - CBC with platelets differential  - Basic metabolic panel    Chronic obstructive pulmonary disease, unspecified COPD type (H)      Essential hypertension with goal blood pressure less than 140/90    - EKG 12-lead complete w/read - Clinics  - CBC with platelets differential  - Basic metabolic panel    Malignant neoplasm of right breast in female, estrogen receptor positive, unspecified site of breast (H)             Risks and Recommendations:  The patient has the following additional risks and recommendations for perioperative complications:   - No identified additional risk factors other than previously addressed    Medication Instructions:  Patient is to take all scheduled medications on the day of surgery EXCEPT for  modifications listed below:   - aspirin: Discontinue aspirin 7-10 days prior to procedure to reduce bleeding risk. It should be resumed postoperatively.    - Beta Blockers: Continue taking on the day of surgery.   - pregabalin, gabapentin: Continue without modification.    RECOMMENDATION:  APPROVAL GIVEN to proceed with proposed procedure, without further diagnostic evaluation.    Review of prior external note(s) from - [facility]where information from [facility] reviewed                  Subjective     HPI related to upcoming procedure: lumbar synovial cyst, lumbar back pain with radiculopathy     Preop Questions 4/7/2021   1. Have you ever had a heart attack or stroke? No   2. Have you ever had surgery on your heart or blood vessels, such as a stent placement, a coronary artery bypass, or surgery on an artery in your head, neck, heart, or legs? No   3. Do you have chest pain with activity? No   4. Do you have a history of  heart failure? No   5. Do you currently have a cold, bronchitis or symptoms of other infection? No   6. Do you have a cough, shortness of breath, or wheezing? No   7. Do you or anyone in your family have previous history of blood clots? No   8. Do you or does anyone in your family have a serious bleeding problem such as prolonged bleeding following surgeries or cuts? No   9. Have you ever had problems with anemia or been told to take iron pills? No   10. Have you had any abnormal blood loss such as black, tarry or bloody stools, or abnormal vaginal bleeding? No   11. Have you ever had a blood transfusion? No   12. Are you willing to have a blood transfusion if it is medically needed before, during, or after your surgery? Yes   13. Have you or any of your relatives ever had problems with anesthesia? YES - nausea and vomiting self    14. Do you have sleep apnea, excessive snoring or daytime drowsiness? No   15. Do you have any artifical heart valves or other implanted medical devices like a pacemaker,  defibrillator, or continuous glucose monitor? No   16. Do you have artificial joints? No   17. Are you allergic to latex? No       Health Care Directive:  Patient does not have a Health Care Directive or Living Will:     Preoperative Review of :   reviewed - controlled substances reflected in medication list.      Status of Chronic Conditions:  COPD - Patient has a longstanding history of moderate-severe COPD . Patient has been doing well overall noting NO SYMPTOMS and continues on medication regimen consisting of no inhalers without adverse reactions or side effects.    HYPERTENSION - Patient has longstanding history of HTN , currently denies any symptoms referable to elevated blood pressure. Specifically denies chest pain, palpitations, dyspnea, orthopnea, PND or peripheral edema. Blood pressure readings have been in normal range at home, in pain today in clinic BP up as noted in vitals . Current medication regimen is as listed below. Patient denies any side effects of medication.       Review of Systems  CONSTITUTIONAL: NEGATIVE for fever, chills, change in weight  INTEGUMENTARY/SKIN: NEGATIVE for worrisome rashes, moles or lesions  EYES: NEGATIVE for vision changes or irritation  ENT/MOUTH: NEGATIVE for ear, mouth and throat problems  RESP: NEGATIVE for significant cough or SOB  CV: NEGATIVE for chest pain, palpitations or peripheral edema  GI: NEGATIVE for nausea, abdominal pain, heartburn, or change in bowel habits  HEME/ALLERGY/IMMUNE: NEGATIVE for bleeding problems  PSYCHIATRIC: NEGATIVE for changes in mood or affect  ROS otherwise negative    Patient Active Problem List    Diagnosis Date Noted     Breast cancer (H) 12/14/2018     Priority: Medium     anastrozole around 1/15/19.  Due hot flashes and bone pain, she switched to exemestane, but she felt worse on it.  She then tried letrozole, but, again, felt worse on that as well.       Right foot pain 05/12/2017     Priority: Medium     Advanced  directives, counseling/discussion 02/16/2015     Priority: Medium     Advance Care Planning:   ACP Review and Resources Provided:  Reviewed chart for advance care plan.  Ila Pierre has no plan or code status on file. Discussed available resources and provided with information. Pt states she has a HCD at home and will bring in a copy.    Added by Susan Isaac on 2/16/2015             Non morbid obesity due to excess calories 02/06/2015     Priority: Medium     Chronic obstructive pulmonary disease, unspecified COPD type (H) 02/05/2015     Priority: Medium     Symptomatic menopausal or female climacteric states 11/03/2014     Priority: Medium     Essential hypertension with goal blood pressure less than 140/90 08/20/2013     Priority: Medium     Diverticula of colon 03/22/2013     Priority: Medium     Palpitations 10/18/2012     Priority: Medium      Past Medical History:   Diagnosis Date     Breast mass      Cancer (H)     right breast     COPD (chronic obstructive pulmonary disease) (H)      Herpes labialis      Hypertension goal BP (blood pressure) < 140/90      Menopausal symptoms      Palpitations      PONV (postoperative nausea and vomiting)      Past Surgical History:   Procedure Laterality Date     COLONOSCOPY  8-21-08     DISSECT LYMPH NODE AXILLA Right 12/14/2018    Procedure: AXILLARY NODE DISECTION ( COLT );  Surgeon: Adolfo Perales MD;  Location:  OR      TOOTH EXTRACTION W/FORCEP  1973    wisdom teeth     HYSTERECTOMY  2005    for cyst and endometiosis     HYSTERECTOMY, PAP NO LONGER INDICATED       INSERT TISSUE EXPANDER BREAST Bilateral 12/14/2018    Procedure: INSERT RIGHT  TISSUE EXPANDER BREAST ( LASHAY );  Surgeon: Mio Rodríguez MD;  Location:  OR     LEG SURGERY  1975    lump removed     MASTECTOMY SIMPLE, SENTINEL NODE, COMBINED Right 12/14/2018    Procedure: RIGHT MASTECTOMY WITH RIGHT SENTINEL NODE BIOPSY (  COLT );  Surgeon: Adolfo Perales MD;  Location:   OR     TONSILLECTOMY  1964     TUBAL LIGATION       Current Outpatient Medications   Medication Sig Dispense Refill     aspirin 81 MG tablet Take 81 mg by mouth daily        atenolol (TENORMIN) 50 MG tablet Take 1 tablet (50 mg) by mouth daily 90 tablet 3     Calcium Carb-Cholecalciferol (CALCIUM 1000 + D) 1000-800 MG-UNIT TABS Take by mouth daily       Cholecalciferol (VITAMIN D3 PO) Take 5,000 Units by mouth every evening Takes at 18:00       gabapentin (NEURONTIN) 100 MG capsule TAPER AS DIRECTED STARTING WITH 1 CAPSULE EVERY NIGHT AT BEDTIME AND INCREASING TO 3 THREE TIMES DAILY       l-tyrosine 500 MG TABS Take 2 tablets by mouth daily.       Misc Natural Products (GLUCOSAMINE CHOND MSM FORMULA PO) Take 1 tablet by mouth every evening (Glucosamine 500 mg Chondroitin 400 mg  mg) takes at 18:00       Multiple Vitamins-Minerals (CENTRUM SILVER) per tablet Take 1 tablet by mouth every evening        Omega-3 Fatty Acids (FISH OIL PO) Take 600 mg by mouth every evening Takes at 18:00       order for DME Equipment being ordered: post mastectomy garment.     Scheduled for right mastectomy, right SLNB, possible axillary node dissection, tissue expander on 18. 2 Units 0     oxyCODONE-acetaminophen (PERCOCET) 5-325 MG tablet Take 1-2 tablets by mouth       Probiotic Product (PROBIOTIC DAILY PO) Take 1 capsule by mouth every evening Acidophilus with goats milk 343 mg- 10 mg.  Takes at 18:00       tamoxifen (NOLVADEX) 10 MG tablet Take 1 tablet (10 mg) by mouth daily 90 tablet 3       Allergies   Allergen Reactions     Penicillins      As a child     Codeine Sulfate Diarrhea        Social History     Tobacco Use     Smoking status: Former Smoker     Quit date: 10/19/1997     Years since quittin.4     Smokeless tobacco: Never Used   Substance Use Topics     Alcohol use: Yes     Comment: 3 beer - 3 times a week       History   Drug Use No         Objective     BP (!) 166/88   Pulse 64   Resp 18   Ht  "1.74 m (5' 8.5\")   Wt 104.2 kg (229 lb 11.2 oz)   SpO2 98%   BMI 34.42 kg/m      Physical Exam  GENERAL APPEARANCE: healthy, alert and no distress  HENT: ear canals and TM's normal and nose and mouth without ulcers or lesions  RESP: lungs clear to auscultation - no rales, rhonchi or wheezes  CV: regular rate and rhythm, normal S1 S2, no S3 or S4 and no murmur, click or rub   SKIN: no suspicious lesions or rashes  NEURO: Normal strength and tone, sensory exam grossly normal, mentation intact and speech normal  PSYCH: mentation appears normal and affect normal/bright    Recent Labs   Lab Test 11/07/19  0933   HGB 14.3         POTASSIUM 4.0   CR 0.64        Diagnostics:  Recent Results (from the past 24 hour(s))   COVID-19 VIRUS (CORONAVIRUS) BY PCR - EXTERNAL RESULT    Collection Time: 04/06/21  1:45 PM   Result Value Ref Range    COVID-19 Virus by PCR (External Result) Negative Negative   CBC with platelets differential    Collection Time: 04/07/21 11:59 AM   Result Value Ref Range    WBC 8.5 4.0 - 11.0 10e9/L    RBC Count 4.59 3.8 - 5.2 10e12/L    Hemoglobin 14.4 11.7 - 15.7 g/dL    Hematocrit 43.4 35.0 - 47.0 %    MCV 95 78 - 100 fl    MCH 31.4 26.5 - 33.0 pg    MCHC 33.2 31.5 - 36.5 g/dL    RDW 12.9 10.0 - 15.0 %    Platelet Count 379 150 - 450 10e9/L    % Neutrophils 74.3 %    % Lymphocytes 18.2 %    % Monocytes 7.2 %    % Eosinophils 0.2 %    % Basophils 0.1 %    Absolute Neutrophil 6.3 1.6 - 8.3 10e9/L    Absolute Lymphocytes 1.6 0.8 - 5.3 10e9/L    Absolute Monocytes 0.6 0.0 - 1.3 10e9/L    Absolute Eosinophils 0.0 0.0 - 0.7 10e9/L    Absolute Basophils 0.0 0.0 - 0.2 10e9/L    Diff Method Automated Method    Basic metabolic panel    Collection Time: 04/07/21 11:59 AM   Result Value Ref Range    Sodium 136 133 - 144 mmol/L    Potassium 4.2 3.4 - 5.3 mmol/L    Chloride 107 94 - 109 mmol/L    Carbon Dioxide 28 20 - 32 mmol/L    Anion Gap 1 (L) 3 - 14 mmol/L    Glucose 112 (H) 70 - 99 mg/dL    Urea " Nitrogen 11 7 - 30 mg/dL    Creatinine 0.68 0.52 - 1.04 mg/dL    GFR Estimate >90 >60 mL/min/[1.73_m2]    GFR Estimate If Black >90 >60 mL/min/[1.73_m2]    Calcium 9.7 8.5 - 10.1 mg/dL      EKG: appears normal, NSR, normal axis, normal intervals, no acute ST/T changes c/w ischemia, no LVH by voltage criteria, unchanged from previous tracings    Revised Cardiac Risk Index (RCRI):  The patient has the following serious cardiovascular risks for perioperative complications:   - No serious cardiac risks = 0 points     RCRI Interpretation: 0 points: Class I (very low risk - 0.4% complication rate)           Signed Electronically by: Maria R Smith PA-C  Copy of this evaluation report is provided to requesting physician.       Detail Level: Detailed

## 2023-08-27 ENCOUNTER — HEALTH MAINTENANCE LETTER (OUTPATIENT)
Age: 68
End: 2023-08-27

## 2023-11-09 DIAGNOSIS — Z12.11 COLON CANCER SCREENING: ICD-10-CM

## 2023-11-23 ENCOUNTER — LAB (OUTPATIENT)
Dept: FAMILY MEDICINE | Facility: CLINIC | Age: 68
End: 2023-11-23
Payer: COMMERCIAL

## 2023-11-23 DIAGNOSIS — Z12.11 COLON CANCER SCREENING: ICD-10-CM

## 2023-11-28 ASSESSMENT — ENCOUNTER SYMPTOMS
FEVER: 0
PALPITATIONS: 0
HEMATURIA: 0
JOINT SWELLING: 0
FREQUENCY: 1
CONSTIPATION: 0
NERVOUS/ANXIOUS: 0
COUGH: 0
CHILLS: 0
HEMATOCHEZIA: 0
HEADACHES: 0
ABDOMINAL PAIN: 0
MYALGIAS: 0
NAUSEA: 0
SORE THROAT: 0
PARESTHESIAS: 0
HEARTBURN: 0
DYSURIA: 0
WEAKNESS: 0
ARTHRALGIAS: 1
DIARRHEA: 0
DIZZINESS: 0
SHORTNESS OF BREATH: 0
BREAST MASS: 0
EYE PAIN: 0

## 2023-11-28 ASSESSMENT — ACTIVITIES OF DAILY LIVING (ADL): CURRENT_FUNCTION: NO ASSISTANCE NEEDED

## 2023-11-29 ENCOUNTER — OFFICE VISIT (OUTPATIENT)
Dept: FAMILY MEDICINE | Facility: CLINIC | Age: 68
End: 2023-11-29
Payer: COMMERCIAL

## 2023-11-29 VITALS
HEIGHT: 68 IN | BODY MASS INDEX: 34.1 KG/M2 | WEIGHT: 225 LBS | DIASTOLIC BLOOD PRESSURE: 76 MMHG | RESPIRATION RATE: 16 BRPM | HEART RATE: 55 BPM | OXYGEN SATURATION: 100 % | SYSTOLIC BLOOD PRESSURE: 136 MMHG | TEMPERATURE: 97.7 F

## 2023-11-29 DIAGNOSIS — E66.811 CLASS 1 OBESITY DUE TO EXCESS CALORIES WITH SERIOUS COMORBIDITY AND BODY MASS INDEX (BMI) OF 34.0 TO 34.9 IN ADULT: ICD-10-CM

## 2023-11-29 DIAGNOSIS — N39.46 MIXED STRESS AND URGE URINARY INCONTINENCE: ICD-10-CM

## 2023-11-29 DIAGNOSIS — I10 BENIGN ESSENTIAL HYPERTENSION: ICD-10-CM

## 2023-11-29 DIAGNOSIS — Z12.31 VISIT FOR SCREENING MAMMOGRAM: ICD-10-CM

## 2023-11-29 DIAGNOSIS — Z78.0 ASYMPTOMATIC MENOPAUSE: ICD-10-CM

## 2023-11-29 DIAGNOSIS — E66.09 CLASS 1 OBESITY DUE TO EXCESS CALORIES WITH SERIOUS COMORBIDITY AND BODY MASS INDEX (BMI) OF 34.0 TO 34.9 IN ADULT: ICD-10-CM

## 2023-11-29 DIAGNOSIS — R73.01 IFG (IMPAIRED FASTING GLUCOSE): ICD-10-CM

## 2023-11-29 DIAGNOSIS — L71.9 ROSACEA: ICD-10-CM

## 2023-11-29 DIAGNOSIS — Z23 NEED FOR VACCINATION: ICD-10-CM

## 2023-11-29 DIAGNOSIS — Z00.00 ENCOUNTER FOR MEDICARE ANNUAL WELLNESS EXAM: Primary | ICD-10-CM

## 2023-11-29 DIAGNOSIS — Z13.6 SCREENING FOR CARDIOVASCULAR CONDITION: ICD-10-CM

## 2023-11-29 LAB
ALBUMIN SERPL BCG-MCNC: 4.2 G/DL (ref 3.5–5.2)
ALBUMIN UR-MCNC: NEGATIVE MG/DL
ALP SERPL-CCNC: 45 U/L (ref 40–150)
ALT SERPL W P-5'-P-CCNC: 9 U/L (ref 0–50)
ANION GAP SERPL CALCULATED.3IONS-SCNC: 11 MMOL/L (ref 7–15)
APPEARANCE UR: CLEAR
AST SERPL W P-5'-P-CCNC: 21 U/L (ref 0–45)
BILIRUB SERPL-MCNC: 0.5 MG/DL
BILIRUB UR QL STRIP: NEGATIVE
BUN SERPL-MCNC: 10.9 MG/DL (ref 8–23)
CALCIUM SERPL-MCNC: 9.6 MG/DL (ref 8.8–10.2)
CHLORIDE SERPL-SCNC: 102 MMOL/L (ref 98–107)
CHOLEST SERPL-MCNC: 192 MG/DL
COLOR UR AUTO: YELLOW
CREAT SERPL-MCNC: 0.67 MG/DL (ref 0.51–0.95)
DEPRECATED HCO3 PLAS-SCNC: 24 MMOL/L (ref 22–29)
EGFRCR SERPLBLD CKD-EPI 2021: >90 ML/MIN/1.73M2
GLUCOSE SERPL-MCNC: 77 MG/DL (ref 70–99)
GLUCOSE UR STRIP-MCNC: NEGATIVE MG/DL
HBA1C MFR BLD: 5.2 % (ref 0–5.6)
HDLC SERPL-MCNC: 87 MG/DL
HGB UR QL STRIP: NEGATIVE
KETONES UR STRIP-MCNC: NEGATIVE MG/DL
LDLC SERPL CALC-MCNC: 92 MG/DL
LEUKOCYTE ESTERASE UR QL STRIP: NEGATIVE
NITRATE UR QL: NEGATIVE
NONHDLC SERPL-MCNC: 105 MG/DL
PH UR STRIP: 5 [PH] (ref 5–7)
POTASSIUM SERPL-SCNC: 3.8 MMOL/L (ref 3.4–5.3)
PROT SERPL-MCNC: 6.9 G/DL (ref 6.4–8.3)
SODIUM SERPL-SCNC: 137 MMOL/L (ref 135–145)
SP GR UR STRIP: 1.01 (ref 1–1.03)
TRIGL SERPL-MCNC: 63 MG/DL
UROBILINOGEN UR STRIP-ACNC: 0.2 E.U./DL

## 2023-11-29 PROCEDURE — 90480 ADMN SARSCOV2 VAC 1/ONLY CMP: CPT | Performed by: PHYSICIAN ASSISTANT

## 2023-11-29 PROCEDURE — 80061 LIPID PANEL: CPT | Performed by: PHYSICIAN ASSISTANT

## 2023-11-29 PROCEDURE — 36415 COLL VENOUS BLD VENIPUNCTURE: CPT | Performed by: PHYSICIAN ASSISTANT

## 2023-11-29 PROCEDURE — 83036 HEMOGLOBIN GLYCOSYLATED A1C: CPT | Performed by: PHYSICIAN ASSISTANT

## 2023-11-29 PROCEDURE — 81003 URINALYSIS AUTO W/O SCOPE: CPT | Performed by: PHYSICIAN ASSISTANT

## 2023-11-29 PROCEDURE — 91320 SARSCV2 VAC 30MCG TRS-SUC IM: CPT | Performed by: PHYSICIAN ASSISTANT

## 2023-11-29 PROCEDURE — G0439 PPPS, SUBSEQ VISIT: HCPCS | Performed by: PHYSICIAN ASSISTANT

## 2023-11-29 PROCEDURE — 99214 OFFICE O/P EST MOD 30 MIN: CPT | Mod: 25 | Performed by: PHYSICIAN ASSISTANT

## 2023-11-29 PROCEDURE — 80053 COMPREHEN METABOLIC PANEL: CPT | Performed by: PHYSICIAN ASSISTANT

## 2023-11-29 RX ORDER — ATENOLOL 50 MG/1
50 TABLET ORAL DAILY
Qty: 90 TABLET | Refills: 3 | Status: SHIPPED | OUTPATIENT
Start: 2023-11-29

## 2023-11-29 RX ORDER — SULFACETAMIDE SODIUM, SULFUR 100; 50 MG/G; MG/G
LOTION TOPICAL 2 TIMES DAILY
Qty: 60 G | Refills: 3 | Status: SHIPPED | OUTPATIENT
Start: 2023-11-29 | End: 2023-12-15

## 2023-11-29 RX ORDER — RESPIRATORY SYNCYTIAL VIRUS VACCINE 120MCG/0.5
0.5 KIT INTRAMUSCULAR ONCE
Qty: 1 EACH | Refills: 0 | Status: CANCELLED | OUTPATIENT
Start: 2023-11-29 | End: 2023-11-29

## 2023-11-29 ASSESSMENT — ENCOUNTER SYMPTOMS
BREAST MASS: 0
MYALGIAS: 0
PARESTHESIAS: 0
FREQUENCY: 1
FEVER: 0
DYSURIA: 0
ABDOMINAL PAIN: 0
CONSTIPATION: 0
COUGH: 0
WEAKNESS: 0
DIARRHEA: 0
NERVOUS/ANXIOUS: 0
SHORTNESS OF BREATH: 0
CHILLS: 0
EYE PAIN: 0
DIZZINESS: 0
JOINT SWELLING: 0
NAUSEA: 0
HEARTBURN: 0
ARTHRALGIAS: 1
HEADACHES: 0
HEMATURIA: 0
HEMATOCHEZIA: 0
SORE THROAT: 0
PALPITATIONS: 0

## 2023-11-29 ASSESSMENT — ACTIVITIES OF DAILY LIVING (ADL): CURRENT_FUNCTION: NO ASSISTANCE NEEDED

## 2023-11-29 ASSESSMENT — PAIN SCALES - GENERAL: PAINLEVEL: NO PAIN (0)

## 2023-11-29 NOTE — PATIENT INSTRUCTIONS
Patient Education   Personalized Prevention Plan  You are due for the preventive services outlined below.  Your care team is available to assist you in scheduling these services.  If you have already completed any of these items, please share that information with your care team to update in your medical record.  Health Maintenance Due   Topic Date Due     LUNG CANCER SCREENING  Never done     RSV VACCINE (Pregnancy & 60+) (1 - 1-dose 60+ series) Never done     Pneumococcal Vaccine (1 - PCV) Never done     Mammogram  04/28/2023     COVID-19 Vaccine (5 - 2023-24 season) 09/01/2023     Colorectal Cancer Screening  10/05/2023     Cholesterol Lab  11/23/2023     ANNUAL REVIEW OF HM ORDERS  11/23/2023     Annual Wellness Visit  11/23/2023     Creatinine Lab  11/23/2023     Preventive Health Recommendations    See your health care provider every year to  Review health changes.   Discuss preventive care.    Review your medicines if your doctor has prescribed any.  You no longer need a yearly Pap test unless you've had an abnormal Pap test in the past 10 years. If you have vaginal symptoms, such as bleeding or discharge, be sure to talk with your provider about a Pap test.  Every 1 to 2 years, have a mammogram.  If you are over 69, talk with your health care provider about whether or not you want to continue having screening mammograms.  Every 10 years, have a colonoscopy. Or, have a yearly FIT test (stool test). These exams will check for colon cancer.   Have a cholesterol test every 5 years, or more often if your doctor advises it.   Have a diabetes test (fasting glucose) every three years. If you are at risk for diabetes, you should have this test more often.   At age 65, have a bone density scan (DEXA) to check for osteoporosis (brittle bone disease).    Shots:  Get a flu shot each year.  Get a tetanus shot every 10 years.  Talk to your doctor about your pneumonia vaccines. There are now two you should receive -  Pneumovax (PPSV 23) and Prevnar (PCV 13).  Talk to your pharmacist about the shingles vaccine.  Talk to your doctor about the hepatitis B vaccine.    Nutrition:   Eat at least 5 servings of fruits and vegetables each day.  Eat whole-grain bread, whole-wheat pasta and brown rice instead of white grains and rice.  Get adequate Calcium and Vitamin D.     Lifestyle  Exercise at least 150 minutes a week (30 minutes a day, 5 days a week). This will help you control your weight and prevent disease.  Limit alcohol to one drink per day.  No smoking.   Wear sunscreen to prevent skin cancer.   See your dentist twice a year for an exam and cleaning.  See your eye doctor every 1 to 2 years to screen for conditions such as glaucoma, macular degeneration and cataracts.    Personalized Prevention Plan  You are due for the preventive services outlined below.  Your care team is available to assist you in scheduling these services.  If you have already completed any of these items, please share that information with your care team to update in your medical record.  Health Maintenance   Topic Date Due     LUNG CANCER SCREENING  Never done     RSV VACCINE (Pregnancy & 60+) (1 - 1-dose 60+ series) Never done     Pneumococcal Vaccine: 65+ Years (1 - PCV) Never done     MAMMO SCREENING  04/28/2023     COVID-19 Vaccine (5 - 2023-24 season) 09/01/2023     COLORECTAL CANCER SCREENING  10/05/2023     LIPID  11/23/2023     ANNUAL REVIEW OF HM ORDERS  11/23/2023     MEDICARE ANNUAL WELLNESS VISIT  11/23/2023     CREATININE  11/23/2023     DEXA  01/14/2024     FALL RISK ASSESSMENT  11/29/2024     DTAP/TDAP/TD IMMUNIZATION (5 - Td or Tdap) 05/12/2027     ADVANCE CARE PLANNING  11/29/2028     HEPATITIS C SCREENING  Completed     PHQ-2 (once per calendar year)  Completed     INFLUENZA VACCINE  Completed     ZOSTER IMMUNIZATION  Completed     IPV IMMUNIZATION  Aged Out     HPV IMMUNIZATION  Aged Out     MENINGITIS IMMUNIZATION  Aged Out     RSV  "MONOCLONAL ANTIBODY  Aged Out     PAP  Discontinued     Learning About Being Physically Active  What is physical activity?     Being physically active means doing any kind of activity that gets your body moving.  The types of physical activity that can help you get fit and stay healthy include:  Aerobic or \"cardio\" activities. These make your heart beat faster and make you breathe harder, such as brisk walking, riding a bike, or running. They strengthen your heart and lungs and build up your endurance.  Strength training activities. These make your muscles work against, or \"resist,\" something. Examples include lifting weights or doing push-ups. These activities help tone and strengthen your muscles and bones.  Stretches. These let you move your joints and muscles through their full range of motion. Stretching helps you be more flexible.  Reaching a balance between these three types of physical activity is important because each one contributes to your overall fitness.  What are the benefits of being active?  Being active is one of the best things you can do for your health. It helps you to:  Feel stronger and have more energy to do all the things you like to do.  Focus better at school or work.  Feel, think, and sleep better.  Reach and stay at a healthy weight.  Lose fat and build lean muscle.  Lower your risk for serious health problems, including diabetes, heart attack, high blood pressure, and some cancers.  Keep your heart, lungs, bones, muscles, and joints strong and healthy.  How can you make being active part of your life?  Start slowly. Make it your long-term goal to get at least 30 minutes of exercise on most days of the week. Walking is a good choice. You also may want to do other activities, such as running, swimming, cycling, or playing tennis or team sports.  Pick activities that you like--ones that make your heart beat faster, your muscles stronger, and your muscles and joints more flexible. If you " "find more than one thing you like doing, do them all. You don't have to do the same thing every day.  Get your heart pumping every day. Any activity that makes your heart beat faster and keeps it at that rate for a while counts.  Here are some great ways to get your heart beating faster:  Go for a brisk walk, run, or hike.  Go for a swim or bike ride.  Take an online exercise class or dance.  Play a game of touch football, basketball, or soccer.  Play tennis, pickleball, or racquetball.  Climb stairs.  Even some household chores can be aerobic. Just do them at a faster pace. Raking or mowing the lawn, sweeping the garage, and vacuuming and cleaning your home all can help get your heart rate up.  Strengthen your muscles during the week. You don't have to lift heavy weights or grow big, bulky muscles to get stronger. Doing a few simple activities that make your muscles work against, or \"resist,\" something can help you get stronger. Aim for at least twice a week.  For example, you can:  Do push-ups or sit-ups, which use your own body weight as resistance.  Lift weights or dumbbells or use stretch bands at home or in a gym or community center.  Stretch your muscles often. Stretching will help you as you become more active. It can help you stay flexible and loosen tight muscles. It can also help improve your balance and posture and can be a great way to relax.  Be sure to stretch the muscles you'll be using when you work out. It's best to warm your muscles slightly before you stretch them. Walk or do some other light aerobic activity for a few minutes. Then start stretching.  When you stretch your muscles:  Do it slowly. Stretching is not about going fast or making sudden movements.  Don't push or bounce during a stretch.  Hold each stretch for at least 15 to 30 seconds, if you can. You should feel a stretch in the muscle, but not pain.  Breathe out as you do the stretch. Then breathe in as you hold the stretch. Don't " "hold your breath.  If you're worried about how more activity might affect your health, have a checkup before you start. Follow any special advice your doctor gives you for getting a smart start.  Where can you learn more?  Go to https://www.[employer].net/patiented  Enter W332 in the search box to learn more about \"Learning About Being Physically Active.\"  Current as of: June 6, 2023               Content Version: 13.8    6788-2006 [employer].   Care instructions adapted under license by your healthcare professional. If you have questions about a medical condition or this instruction, always ask your healthcare professional. [employer] disclaims any warranty or liability for your use of this information.      Hearing Loss: Care Instructions  Overview     Hearing loss is a sudden or slow decrease in how well you hear. It can range from slight to profound. Permanent hearing loss can occur with aging. It also can happen when you are exposed long-term to loud noise. Examples include listening to loud music, riding motorcycles, or being around other loud machines.  Hearing loss can affect your work and home life. It can make you feel lonely or depressed. You may feel that you have lost your independence. But hearing aids and other devices can help you hear better and feel connected to others.  Follow-up care is a key part of your treatment and safety. Be sure to make and go to all appointments, and call your doctor if you are having problems. It's also a good idea to know your test results and keep a list of the medicines you take.  How can you care for yourself at home?  Avoid loud noises whenever possible. This helps keep your hearing from getting worse.  Always wear hearing protection around loud noises.  Wear a hearing aid as directed.  A professional can help you pick a hearing aid that will work best for you.  You can also get hearing aids over the counter for mild to moderate hearing " "loss.  Have hearing tests as your doctor suggests. They can show whether your hearing has changed. Your hearing aid may need to be adjusted.  Use other devices as needed. These may include:  Telephone amplifiers and hearing aids that can connect to a television, stereo, radio, or microphone.  Devices that use lights or vibrations. These alert you to the doorbell, a ringing telephone, or a baby monitor.  Television closed-captioning. This shows the words at the bottom of the screen. Most new TVs can do this.  TTY (text telephone). This lets you type messages back and forth on the telephone instead of talking or listening. These devices are also called TDD. When messages are typed on the keyboard, they are sent over the phone line to a receiving TTY. The message is shown on a monitor.  Use text messaging, social media, and email if it is hard for you to communicate by telephone.  Try to learn a listening technique called speechreading. It is not lipreading. You pay attention to people's gestures, expressions, posture, and tone of voice. These clues can help you understand what a person is saying. Face the person you are talking to, and have them face you. Make sure the lighting is good. You need to see the other person's face clearly.  Think about counseling if you need help to adjust to your hearing loss.  When should you call for help?  Watch closely for changes in your health, and be sure to contact your doctor if:    You think your hearing is getting worse.     You have new symptoms, such as dizziness or nausea.   Where can you learn more?  Go to https://www.[employer].net/patiented  Enter R798 in the search box to learn more about \"Hearing Loss: Care Instructions.\"  Current as of: February 28, 2023               Content Version: 13.8    [id number] [employer].   Care instructions adapted under license by your healthcare professional. If you have questions about a medical condition or this instruction, " always ask your healthcare professional. [employer] disclaims any warranty or liability for your use of this information.      Bladder Training: Care Instructions  Your Care Instructions     Bladder training is used to treat urge incontinence and stress incontinence. Urge incontinence means that the need to urinate comes on so fast that you can't get to a toilet in time. Stress incontinence means that you leak urine because of pressure on your bladder. For example, it may happen when you laugh, cough, or lift something heavy.  Bladder training can increase how long you can wait before you have to urinate. It can also help your bladder hold more urine. And it can give you better control over the urge to urinate.  It is important to remember that bladder training takes a few weeks to a few months to make a difference. You may not see results right away, but don't give up.  Follow-up care is a key part of your treatment and safety. Be sure to make and go to all appointments, and call your doctor if you are having problems. It's also a good idea to know your test results and keep a list of the medicines you take.  How can you care for yourself at home?  Work with your doctor to come up with a bladder training program that is right for you. You may use one or more of the following methods.  Delayed urination  In the beginning, try to keep from urinating for 5 minutes after you first feel the need to go.  While you wait, take deep, slow breaths to relax. Kegel exercises can also help you delay the need to go to the bathroom.  After some practice, when you can easily wait 5 minutes to urinate, try to wait 10 minutes before you urinate.  Slowly increase the waiting period until you are able to control when you have to urinate.  Scheduled urination  Empty your bladder when you first wake up in the morning.  Schedule times throughout the day when you will urinate.  Start by going to the bathroom every hour, even  "if you don't need to go.  Slowly increase the time between trips to the bathroom.  When you have found a schedule that works well for you, keep doing it.  If you wake up during the night and have to urinate, do it. Apply your schedule to waking hours only.  Kegel exercises  These tighten and strengthen pelvic muscles, which can help you control the flow of urine. (If doing these exercises causes pain, stop doing them and talk with your doctor.) To do Kegel exercises:  Squeeze your muscles as if you were trying not to pass gas. Or squeeze your muscles as if you were stopping the flow of urine. Your belly, legs, and buttocks shouldn't move.  Hold the squeeze for 3 seconds, then relax for 5 to 10 seconds.  Start with 3 seconds, then add 1 second each week until you are able to squeeze for 10 seconds.  Repeat the exercise 10 times a session. Do 3 to 8 sessions a day.  When should you call for help?  Watch closely for changes in your health, and be sure to contact your doctor if:    Your incontinence is getting worse.     You do not get better as expected.   Where can you learn more?  Go to https://www.[employer].net/patiented  Enter V684 in the search box to learn more about \"Bladder Training: Care Instructions.\"  Current as of: February 28, 2023               Content Version: 13.8    5454-4267 [employer].   Care instructions adapted under license by your healthcare professional. If you have questions about a medical condition or this instruction, always ask your healthcare professional. [employer] disclaims any warranty or liability for your use of this information.         "

## 2023-11-29 NOTE — PROGRESS NOTES
"SUBJECTIVE:   Winnie is a 68 year old, presenting for the following:  Wellness Visit      Are you in the first 12 months of your Medicare coverage?  No    Healthy Habits:     In general, how would you rate your overall health?  Good    Frequency of exercise:  1 day/week    Duration of exercise:  Less than 15 minutes    Do you usually eat at least 4 servings of fruit and vegetables a day, include whole grains    & fiber and avoid regularly eating high fat or \"junk\" foods?  Yes    Taking medications regularly:  Yes    Medication side effects:  None    Ability to successfully perform activities of daily living:  No assistance needed    Home Safety:  No safety concerns identified    Hearing Impairment:  Difficulty understanding soft or whispered speech    In the past 6 months, have you been bothered by leaking of urine? Yes    In general, how would you rate your overall mental or emotional health?  Good    Additional concerns today:  No    - Notes increased urinary urgency, triggered when she stands up. Will often get leakage of urine if she doesn't get to a bathroom in time. No dysuria, hematuria.    Today's PHQ-2 Score:       11/28/2023     3:25 PM   PHQ-2 ( 1999 Pfizer)   Q1: Little interest or pleasure in doing things 0   Q2: Feeling down, depressed or hopeless 0   PHQ-2 Score 0   Q1: Little interest or pleasure in doing things Not at all   Q2: Feeling down, depressed or hopeless Not at all   PHQ-2 Score 0     Have you ever done Advance Care Planning? (For example, a Health Directive, POLST, or a discussion with a medical provider or your loved ones about your wishes): Yes, patient states has an Advance Care Planning document and will bring a copy to the clinic.       Fall risk  Fallen 2 or more times in the past year?: No  Any fall with injury in the past year?: Yes    Cognitive Screening   1) Repeat 3 items (Leader, Season, Table)    2) Clock draw: NORMAL  3) 3 item recall: Recalls 3 objects  Results: 3 items " recalled: COGNITIVE IMPAIRMENT LESS LIKELY    Mini-CogTM Copyright ANNALISA Cobos. Licensed by the author for use in [facility]; reprinted with permission (rubens@[facility]). All rights reserved.      Reviewed and updated as needed this visit by clinical staff   Tobacco  Allergies  Meds  Problems  Med Hx  Surg Hx  Fam Hx          Reviewed and updated as needed this visit by Provider   Tobacco  Allergies  Meds  Problems  Med Hx  Surg Hx  Fam Hx         Social History     Tobacco Use    Smoking status: Former     Years: 20     Types: Cigarettes     Quit date: 10/19/1997     Years since quittin.1    Smokeless tobacco: Never    Tobacco comments:     social smoking only   Substance Use Topics    Alcohol use: Yes     Comment: 3 beers - 3 times a week; 2 glasses wine - 2 times a week             2023     3:25 PM   Alcohol Use   Prescreen: >3 drinks/day or >7 drinks/week? No          No data to display              Do you have a current opioid prescription? No  Do you use any other controlled substances or medications that are not prescribed by a provider? Alcohol    Hypertension Follow-up    Do you check your blood pressure regularly outside of the clinic? No   Are you following a low salt diet? Yes  Are your blood pressures ever more than 140 on the top number (systolic) OR more   than 90 on the bottom number (diastolic), for example 140/90? No    - Started Metrogel last year for rosacea. Helped quite a bit with redness but continues to get pustules frequently.    Current providers sharing in care for this patient include:   Patient Care Team:  Samina Yen MD as PCP - General (Internal Medicine)  Charla Velasquez PA-C as Assigned PCP    The following health maintenance items are reviewed in Epic and correct as of today:  Health Maintenance   Topic Date Due    RSV VACCINE (Pregnancy & 60+) (1 - 1-dose 60+ series) Never done    Pneumococcal Vaccine: 65+ Years (1 - PCV) Never done     MAMMO SCREENING  04/28/2023    COLORECTAL CANCER SCREENING  10/05/2023    LIPID  11/23/2023    MEDICARE ANNUAL WELLNESS VISIT  11/23/2023    CREATININE  11/23/2023    DEXA  01/14/2024    ANNUAL REVIEW OF HM ORDERS  11/29/2024    FALL RISK ASSESSMENT  11/29/2024    DTAP/TDAP/TD IMMUNIZATION (5 - Td or Tdap) 05/12/2027    ADVANCE CARE PLANNING  11/29/2028    HEPATITIS C SCREENING  Completed    PHQ-2 (once per calendar year)  Completed    INFLUENZA VACCINE  Completed    ZOSTER IMMUNIZATION  Completed    COVID-19 Vaccine  Completed    IPV IMMUNIZATION  Aged Out    HPV IMMUNIZATION  Aged Out    MENINGITIS IMMUNIZATION  Aged Out    RSV MONOCLONAL ANTIBODY  Aged Out    PAP  Discontinued       FHS-7:       4/28/2022    10:32 AM   Breast CA Risk Assessment (FHS-7)   Did any of your first-degree relatives have breast or ovarian cancer? Yes   Did any of your relatives have bilateral breast cancer? No   Did any man in your family have breast cancer? No   Did any woman in your family have breast and ovarian cancer? No   Did any woman in your family have breast cancer before age 50 y? No   Do you have 2 or more relatives with breast and/or ovarian cancer? No   Do you have 2 or more relatives with breast and/or bowel cancer? No     Mammogram Screening - Alternate mammogram schedule due to breast cancer history  Pertinent mammograms are reviewed under the imaging tab.    Review of Systems   Constitutional:  Negative for chills and fever.   HENT:  Negative for congestion, ear pain, hearing loss and sore throat.    Eyes:  Negative for pain and visual disturbance.   Respiratory:  Negative for cough and shortness of breath.    Cardiovascular:  Negative for chest pain, palpitations and peripheral edema.   Gastrointestinal:  Negative for abdominal pain, constipation, diarrhea, heartburn, hematochezia and nausea.   Breasts:  Negative for tenderness, breast mass and discharge.   Genitourinary:  Positive for frequency and urgency. Negative  "for dysuria, genital sores, hematuria, pelvic pain, vaginal bleeding and vaginal discharge.   Musculoskeletal:  Positive for arthralgias. Negative for joint swelling and myalgias.   Skin:  Negative for rash.   Neurological:  Negative for dizziness, weakness, headaches and paresthesias.   Psychiatric/Behavioral:  Negative for mood changes. The patient is not nervous/anxious.        OBJECTIVE:   /76 (BP Location: Left arm, Patient Position: Sitting, Cuff Size: Adult Large)   Pulse 55   Temp 97.7  F (36.5  C) (Tympanic)   Resp 16   Ht 1.72 m (5' 7.72\")   Wt 102.1 kg (225 lb)   SpO2 100%   BMI 34.50 kg/m   Estimated body mass index is 34.5 kg/m  as calculated from the following:    Height as of this encounter: 1.72 m (5' 7.72\").    Weight as of this encounter: 102.1 kg (225 lb).  Physical Exam  GENERAL APPEARANCE: healthy, alert and no distress  EYES: Eyes grossly normal to inspection, PERRL and conjunctivae and sclerae normal  HENT: ear canals and TM's normal, nose and mouth without ulcers or lesions, oropharynx clear and oral mucous membranes moist  NECK: no adenopathy, no asymmetry, masses, or scars and thyroid normal to palpation  RESP: lungs clear to auscultation - no rales, rhonchi or wheezes  CV: regular rate and rhythm, normal S1 S2, no S3 or S4, no murmur, click or rub, no peripheral edema and peripheral pulses strong  MS: no musculoskeletal defects are noted and gait is age appropriate without ataxia  SKIN: no suspicious lesions or rashes  NEURO: Normal strength and tone, sensory exam grossly normal, mentation intact and speech normal  PSYCH: mentation appears normal and affect normal/bright    Results for orders placed or performed in visit on 11/29/23   UA Macroscopic with reflex to Microscopic and Culture - Lab Collect     Status: Normal    Specimen: Urine, Midstream   Result Value Ref Range    Color Urine Yellow Colorless, Straw, Light Yellow, Yellow    Appearance Urine Clear Clear    Glucose " Urine Negative Negative mg/dL    Bilirubin Urine Negative Negative    Ketones Urine Negative Negative mg/dL    Specific Gravity Urine 1.010 1.003 - 1.035    Blood Urine Negative Negative    pH Urine 5.0 5.0 - 7.0    Protein Albumin Urine Negative Negative mg/dL    Urobilinogen Urine 0.2 0.2, 1.0 E.U./dL    Nitrite Urine Negative Negative    Leukocyte Esterase Urine Negative Negative    Narrative    Microscopic not indicated       ASSESSMENT / PLAN:       ICD-10-CM    1. Encounter for Medicare annual wellness exam  Z00.00 CANCELED: Lipid panel reflex to direct LDL Non-fasting      2. Benign essential hypertension  I10 Comprehensive metabolic panel     atenolol (TENORMIN) 50 MG tablet     Comprehensive metabolic panel      3. Rosacea  L71.9 sulfacetamide sodium-sulfur 10-5 % LOTN      4. Mixed stress and urge urinary incontinence  N39.46 Physical Therapy Referral     UA Macroscopic with reflex to Microscopic and Culture - Lab Collect     UA Macroscopic with reflex to Microscopic and Culture - Lab Collect      5. Asymptomatic menopause  Z78.0 DX Hip/Pelvis/Spine      6. IFG (impaired fasting glucose)  R73.01 Hemoglobin A1c     Hemoglobin A1c      7. Class 1 obesity due to excess calories with serious comorbidity and body mass index (BMI) of 34.0 to 34.9 in adult  E66.09     Z68.34       8. Visit for screening mammogram  Z12.31 MA SCREENING DIGITAL BILAT - Future  (s+30)     CANCELED: MA SCREENING DIGITAL BILAT - Future  (s+30)      9. Need for vaccination  Z23       10. Screening for cardiovascular condition  Z13.6 Lipid panel reflex to direct LDL Fasting     Lipid panel reflex to direct LDL Fasting        - HTN well-managed on current regimen, continue w/o change.  - Add sulfacetamide lotion BID, continue Metrogel BID for rosacea management. If no improvement in pustular rosacea with this regimen, consider addition of low dose tetracycline antibx.  - U/A unremarkable. S/s consistent with mixed stress and urge  "incontinence. Referral placed to PFPT for further evaluation and management.      COUNSELING:  Reviewed preventive health counseling, as reflected in patient instructions       Bladder control       Immunizations  Vaccinated for: Covid-19        BMI:   Estimated body mass index is 34.5 kg/m  as calculated from the following:    Height as of this encounter: 1.72 m (5' 7.72\").    Weight as of this encounter: 102.1 kg (225 lb).   Weight management plan: Discussed healthy diet and exercise guidelines per patient guidelines      She reports that she quit smoking about 26 years ago. Her smoking use included cigarettes. She has never used smokeless tobacco.      Appropriate preventive services were discussed with this patient, including applicable screening as appropriate for fall prevention, nutrition, physical activity, Tobacco-use cessation, weight loss and cognition.  Checklist reviewing preventive services available has been given to the patient.    Reviewed patients plan of care and provided an AVS. The Basic Care Plan (routine screening as documented in Health Maintenance) for Ila meets the Care Plan requirement. This Care Plan has been established and reviewed with the Patient.          Charla Velasquez PA-C  [facility]    Identified Health Risks:  I have reviewed Opioid Use Disorder and Substance Use Disorder risk factors and made any needed referrals. She is at risk for lack of exercise and has been provided with information to increase physical activity for the benefit of her well-being.  The patient was provided with written information regarding signs of hearing loss.  Information on urinary incontinence and treatment options given to patient.  "

## 2023-11-30 ENCOUNTER — TELEPHONE (OUTPATIENT)
Dept: INTERNAL MEDICINE | Facility: CLINIC | Age: 68
End: 2023-11-30
Payer: COMMERCIAL

## 2023-11-30 DIAGNOSIS — L71.9 ROSACEA: Primary | ICD-10-CM

## 2023-11-30 NOTE — TELEPHONE ENCOUNTER
Prior Authorization Retail Medication Request    Medication/Dose: sulfacetamide sodium-sulfur 10-5 % LOTN  Diagnosis and ICD code (if different than what is on RX):    New/renewal/insurance change PA/secondary ins. PA:  Previously Tried and Failed:    Rationale:      Insurance   Primary: na  Insurance ID:  [id number]    Secondary (if applicable):  Insurance ID:      Pharmacy Information (if different than what is on RX)  Name:  same  Phone:  [phone]  Fax:

## 2023-12-04 NOTE — TELEPHONE ENCOUNTER
PA Initiation    Medication: SULFACETAMIDE SODIUM-SULFUR 10-5 % EX LOTN  Insurance Company: [employer] - Phone [phone] Fax [phone]  Pharmacy Filling the Rx: [id number] [place], [place] - [address] AT [facility] [address]  Filling Pharmacy Phone: [phone]  Filling Pharmacy Fax:    Start Date: 12/4/2023  Central Prior Authorization Team   Phone: [phone]

## 2023-12-12 NOTE — TELEPHONE ENCOUNTER
PRIOR AUTHORIZATION DENIED    Medication: SULFACETAMIDE SODIUM-SULFUR 10-5 % EX LOTN  Insurance Company: [employer] - Phone [phone] Fax [phone]  Denial Date: 12/4/2023  Denial Reason(s):     Appeal Information: None Available Part D Plan Exclusion  Patient Notified: Pharmacy will notify patient.

## 2023-12-15 RX ORDER — AZELAIC ACID 0.15 G/G
GEL TOPICAL 2 TIMES DAILY
Qty: 50 G | Refills: 4 | Status: SHIPPED | OUTPATIENT
Start: 2023-12-15

## 2023-12-15 NOTE — TELEPHONE ENCOUNTER
Received a call back from the patient. Relayed message from the provider. Patient expressed understanding and had no additional questions at this time.     Aleena Ga RN

## 2023-12-15 NOTE — TELEPHONE ENCOUNTER
Please inform patient insurance will not cover rx, so will switch to azelaic acid which is on her formulary; application instructions remain the same. Updated rx sent to her pharmacy.

## 2023-12-21 LAB — NONINV COLON CA DNA+OCC BLD SCRN STL QL: NEGATIVE

## 2024-01-08 ENCOUNTER — THERAPY VISIT (OUTPATIENT)
Dept: PHYSICAL THERAPY | Facility: CLINIC | Age: 69
End: 2024-01-08
Attending: PHYSICIAN ASSISTANT
Payer: COMMERCIAL

## 2024-01-08 DIAGNOSIS — N81.89 PELVIC FLOOR WEAKNESS IN FEMALE: ICD-10-CM

## 2024-01-08 DIAGNOSIS — N39.46 MIXED STRESS AND URGE URINARY INCONTINENCE: Primary | ICD-10-CM

## 2024-01-08 PROCEDURE — 97161 PT EVAL LOW COMPLEX 20 MIN: CPT | Mod: GP

## 2024-01-08 PROCEDURE — 97110 THERAPEUTIC EXERCISES: CPT | Mod: GP

## 2024-01-08 PROCEDURE — 97530 THERAPEUTIC ACTIVITIES: CPT | Mod: GP

## 2024-01-08 NOTE — PROGRESS NOTES
PHYSICAL THERAPY EVALUATION  Type of Visit: Evaluation    See electronic medical record for Abuse and Falls Screening details.    Subjective       Presenting condition or subjective complaint: Bladder leakage. Complains of urinary leakage that occurs typically when walking to the bathroom with a full bladder. Complains of urinary urgency that is triggered when she gets home and typically results in urinary leaks. States she has larger urgency when she stands up after sitting and she does not recognize the urgency while seated.   Date of onset:  within the past year    Relevant medical history: Bladder or bowel problems; Cancer; High blood pressure; Overweight. Breat cancer 2018.   Dates & types of surgery: 2021 Synovial Cyst Removal lower back. Mastectomy right breast 2018. Reconstruction 2019.  Hysterectomy 2006.    Prior diagnostic imaging/testing results:       Prior therapy history for the same diagnosis, illness or injury: No      Prior Level of Function  Transfers:   Ambulation:   ADL:   IADL:     Living Environment  Social support: With a significant other or spouse   Type of home: House   Stairs to enter the home: Yes 2 Is there a railing: No   Ramp: No   Stairs inside the home: Yes 12 Is there a railing: Yes   Help at home: None  Equipment owned:       Employment: Not Applicable    Hobbies/Interests: Sewing, cooking, boating and exploring    Patient goals for therapy: Control leakage    Pain assessment:      Objective      PELVIC EVALUATION  ADDITIONAL HISTORY:  Sex assigned at birth: Female  Gender identity: Female    Pronouns: Other ?    Bladder History:  Feels bladder filling: No. States she does get normal urges  Triggers for feeling of inability to wait to go to the bathroom: Yes While sitting I get the urge and when I stand the urge is much stronger. States she typically goes every 2-3 hours, or more often if she has bladder irritant.   How long can you wait to urinate: Depends on how much liquid I  have drank usually 1/2 hour  Gets up at night to urinate: Yes 1  Can stop the flow of urine when urinating: No  Volume of urine usually released: Medium   Other issues:  Slight spray of urine at the end  Number of bladder infections in last 12 months:    Fluid intake per day: 48-64 ounces 24 ounces 12-24 ounces  Medications taken for bladder: No     Activities causing urine leak: Hurrying to the bathroom due to a strong urge to urinate (pee)    Amount of urine typically leaked: Depends on how long I've waited. Can be drops, can be a lot where she feels she has no control at all  Pads used to help with leaking: Yes I use this many pads per day: Not everyday  2 per week I use this type/brand: Depends discreet    Bowel History:  Frequency of bowel movement: everyday 1-3 times per day  Consistency of stool: Soft-formed    Ignores the urge to defecate: No  Other bowel issues:    Length of time spent trying to have a bowel movement:      Sexual Function History:  Sexual orientation: Straight    Sexually active: Yes  Lubrication used: No No  Pelvic pain:      Pain or difficulty with orgasms/erection/ejaculation: No    State of menopause: Post-menopause (I am done with menopause)  Hormone medications: No      Are you currently pregnant: No, Number of previous pregnancies: 0, Number of deliveries: 0, Have you been diagnosed with pelvic prolapse or abdominal separation: No, Do you get regular exercise: Yes, I do this type of exercise: walking, Have you tried pelvic floor strengthening exercises for 4 weeks: No, Do you have any history of trauma that is relevant to your care that you d like to share: No    Discussed reason for referral regarding pelvic health needs and external/internal pelvic floor muscle examination with patient/guardian.  Opportunity provided to ask questions and verbal consent for assessment and intervention was given.    PAIN:   POSTURE:   LUMBAR SCREEN:  reaches toes upon flexion with flat lumbar spine.  Extension hinges at L2-3. Side bend unremarkable.   HIP SCREEN: Mod limited IR bilaterally, slight limitation with adduction bilaterally  Strength: Hip flexion 4+/5 bilat, hip ADD/ABD screen 4+/5 bilat with equal pressure    Functional Strength Testing: Double Leg Squat: Anterior knee translation and only willing to squat to approx 60 degrees knee flexion  SLS: L 5 seconds, R requires 3 attempts with minimal UE assist stands 8 seconds    PELVIC/SI SCREEN: Thigh thrust: - bilaterally. Compression: - with tenderness at ASIS    External Visual Inspection:  At rest: Normal  With voluntary pelvic floor contraction: Perineal elevation  Relaxation of PFM: Yes  With intra-abdominal pressure: Bearing down as defecation: slight perineal depression    Integumentary:   Introitus: Loose    External Digital Palpation per Perineum:   Ischiocavernosis: Unremarkable  Bulbo cavernosis: Unremarkable  Transverse perineal: Unremarkable  Levator ani: Unremarkable  Perineal body: Unremarkable    Scar:   Location/Type:   Mobility:     Internal Digital Palpation:  Per Vagina:  Digital Muscle Performance: P (Power): 2/5  E (Endurance): 10 seconds  F (Fast Twitch): 10, delayed relaxation with each repetition  Compensations: Abdominals, Gluts  Relaxation Post-Contraction: Partial/delayed relaxation  Poor coordination of PFM with increased time for derecruitment and poor lift of PFM with contraction.     Per Rectum:        Pelvic Organ Prolapse:       ABDOMINAL ASSESSMENT  Diastasis Rectus Abdominis (CELIA):  CELIA presence: No    Abdominal Activation/Strength: When asked to contract core, pt activates with overactivity of rectus abdominis    Scar:   Location/Type:   Mobility:     Fascial Tension/Restriction/Tone:     BIOFEEDBACK:  Position:   Surface Electrodes:     Abdominals:     Perianals:       DERMATOMES:   DTR S:     Assessment & Plan   CLINICAL IMPRESSIONS  Medical Diagnosis: Mixed stress and urge urinary incontinence.    Treatment  Diagnosis: Pelvic floor weakness   Impression/Assessment: Patient is a 68 year old female with urinary urgency and leakage complaints.  The following significant findings have been identified: Decreased strength, Impaired balance, Impaired muscle performance, and Decreased activity tolerance. These impairments interfere with their ability to perform self care tasks, recreational activities, and community mobility as compared to previous level of function.     Clinical Decision Making (Complexity):  Clinical Presentation: Stable/Uncomplicated  Clinical Presentation Rationale: based on medical and personal factors listed in PT evaluation  Clinical Decision Making (Complexity): Low complexity    PLAN OF CARE  Treatment Interventions:  Modalities: Biofeedback  Interventions: Manual Therapy, Neuromuscular Re-education, Therapeutic Activity, Therapeutic Exercise, Self-Care/Home Management    Long Term Goals            Frequency of Treatment: 1x per week for 3 weeks, 1x per 2 weeks for 7 week adjusting frequency as indicated by patient presentation  Duration of Treatment: 10 weeks    Recommended Referrals to Other Professionals:   Education Assessment:   Learner/Method: Patient    Risks and benefits of evaluation/treatment have been explained.   Patient/Family/caregiver agrees with Plan of Care.     Evaluation Time:     PT Eval, Low Complexity Minutes (42075): 30       Signing Clinician: Vilma Campbell, PT      [facility]                                                                                   OUTPATIENT PHYSICAL THERAPY      PLAN OF TREATMENT FOR OUTPATIENT REHABILITATION   Patient's Last Name, First Name, BERNIENORA  Ila Pierre YOB: 1955   Provider's Name   [facility]   Medical Record No.  [id number]     Onset Date:    Start of Care Date: 01/08/24     Medical Diagnosis:  Mixed stress and urge urinary incontinence.      PT  Treatment Diagnosis:  Pelvic floor weakness Plan of Treatment  Frequency/Duration: 1x per week for 3 weeks, 1x per 2 weeks for 7 week adjusting frequency as indicated by patient presentation/ 10 weeks    Certification date from 01/08/24 to 03/17/24         See note for plan of treatment details and functional goals     Vilma Campbell, PT                         I CERTIFY THE NEED FOR THESE SERVICES FURNISHED UNDER        THIS PLAN OF TREATMENT AND WHILE UNDER MY CARE     (Physician attestation of this document indicates review and certification of the therapy plan).              Referring Provider:  Charla Velasquez    Initial Assessment  See Epic Evaluation- Start of Care Date: 01/08/24

## 2024-01-15 ENCOUNTER — THERAPY VISIT (OUTPATIENT)
Dept: PHYSICAL THERAPY | Facility: CLINIC | Age: 69
End: 2024-01-15
Attending: PHYSICIAN ASSISTANT
Payer: COMMERCIAL

## 2024-01-15 DIAGNOSIS — N39.46 MIXED STRESS AND URGE URINARY INCONTINENCE: Primary | ICD-10-CM

## 2024-01-15 DIAGNOSIS — N81.89 PELVIC FLOOR WEAKNESS IN FEMALE: ICD-10-CM

## 2024-01-15 PROCEDURE — 97530 THERAPEUTIC ACTIVITIES: CPT | Mod: GP

## 2024-01-15 PROCEDURE — 97110 THERAPEUTIC EXERCISES: CPT | Mod: GP

## 2024-04-23 ENCOUNTER — OFFICE VISIT (OUTPATIENT)
Dept: DERMATOLOGY | Facility: CLINIC | Age: 69
End: 2024-04-23
Attending: PHYSICIAN ASSISTANT
Payer: COMMERCIAL

## 2024-04-23 DIAGNOSIS — L71.9 ROSACEA: ICD-10-CM

## 2024-04-23 DIAGNOSIS — L28.2 PRURIGO PAPULE: Primary | ICD-10-CM

## 2024-04-23 PROCEDURE — 99203 OFFICE O/P NEW LOW 30 MIN: CPT | Performed by: PHYSICIAN ASSISTANT

## 2024-04-23 NOTE — PROGRESS NOTES
HPI:   Chief complaints: Ila Pierre is a pleasant 68 year old female who presents for evaluation of a spot on the left cheek. The spot was raised, brown and irritated. She picked the area and it ended up falling off. She does have other pink scaly areas on her arms but will occasionally go on her legs. The areas start out small and she picks then. After this they resolve. Lastly, she has rosacea on her face. She was given azelaic acid and metrogel but she does not like to use these. She uses Lever 2000 to wash and no sunscreen.       PHYSICAL EXAM:    There were no vitals taken for this visit.  Skin exam performed as follows: Type 2 skin. Mood appropriate  Alert and Oriented X 3. Well developed, well nourished in no distress.  General appearance: Normal  Head including face: Normal  Eyes: conjunctiva and lids: Normal  Mouth: Lips, teeth, gums: Normal  Neck: Normal  Skin: Scalp and body hair: See below    Skin of left cheek clear  Few lichenified papules on the right arm  Background erythema on the face    ASSESSMENT/PLAN:     Skin of left cheek clear of any abnormal lesions. Discussed likely was a seborrheic keratoses.   Few prurigo papules on the arms - discussed secondary to external trauma and advised to try to not pick the areas.   Rosacea on the face - discussed diagnosis and treatment options  --Gentle wash daily-BID  --Start using a moisturizer with SPF - samples of mineral sunscreens given          Follow-up: PRN  CC:   Scribed By: Talya Boyd, MS, PA-C

## 2024-04-23 NOTE — LETTER
4/23/2024         RE: Ila Pierre  [address] [facility] [address]        Dear Colleague,    Thank you for referring your patient, Ila Pierre, to the [facility]. Please see a copy of my visit note below.    HPI:   Chief complaints: Ila Pierre is a pleasant 68 year old female who presents for evaluation of a spot on the left cheek. The spot was raised, brown and irritated. She picked the area and it ended up falling off. She does have other pink scaly areas on her arms but will occasionally go on her legs. The areas start out small and she picks then. After this they resolve. Lastly, she has rosacea on her face. She was given azelaic acid and metrogel but she does not like to use these. She uses Lever 2000 to wash and no sunscreen.       PHYSICAL EXAM:    There were no vitals taken for this visit.  Skin exam performed as follows: Type 2 skin. Mood appropriate  Alert and Oriented X 3. Well developed, well nourished in no distress.  General appearance: Normal  Head including face: Normal  Eyes: conjunctiva and lids: Normal  Mouth: Lips, teeth, gums: Normal  Neck: Normal  Skin: Scalp and body hair: See below    Skin of left cheek clear  Few lichenified papules on the right arm  Background erythema on the face    ASSESSMENT/PLAN:     Skin of left cheek clear of any abnormal lesions. Discussed likely was a seborrheic keratoses.   Few prurigo papules on the arms - discussed secondary to external trauma and advised to try to not pick the areas.   Rosacea on the face - discussed diagnosis and treatment options  --Gentle wash daily-BID  --Start using a moisturizer with SPF - samples of mineral sunscreens given          Follow-up: PRN  CC:   Scribed By: Talya Boyd, MS, PAKACI      Again, thank you for allowing me to participate in the care of your patient.        Sincerely,        Talya Boyd PA-C

## 2024-04-26 NOTE — PROGRESS NOTES
10/17/19 1300   Quick Adds   Type of Visit Initial OP PT Evaluation   General Information   Start of Care Date 10/17/19   Referring Physician Dr. Belkis Molina   Orders Evaluate and Treat as Indicated   Additional Orders Cancer rehab   Order Date 09/17/19   Medical Diagnosis Malignant neoplasm of right breast in female, estrogen receptor positive, unspecified site of breast (H)   Onset of illness/injury or Date of Surgery 12/14/18   Surgical/Medical history reviewed Yes   Pertinent history of current problem (include personal factors and/or comorbidities that impact the POC) pt with right breast cancer, s/p R mastectomy without node invovlement, staged [id number], currently restarted hormonal therapy with fair tolerance. Now having difficulty with bilateral shoulder pain-mainly with crossing midline  and taking shirt off.    Prior level of function comment independent, retired   Current Community Support Family/friend caregiver   Patient role/Employment history Retired   Living environment House/[facility]   Home/Community Accessibility Comments no concerns   Assistive Devices Comments no AD   Patient/Family Goals Statement I need these shoulders to get better.   Fall Risk Screen   Fall screen completed by PT   Have you fallen 2 or more times in the past year? No   Have you fallen and had an injury in the past year? No   Is patient a fall risk? No   Pain   Patient currently in pain Yes   Pain comments 5/10 at baseline; worse with crossing midline and horizontal adduction with arm at 90 degrees.    Range of Motion (ROM)   ROM Comment achieves AROM but with pain for abduction, flexion, horizontal adduction bilaterally. Apley Left T11, R L2. +Impingement test Left.    Strength   Strength Comments 4/5 for abduction, flexion, ER bilaterally. 3+/5 IR.   Bed Mobility   Bed Mobility Comments independent   Sensory Examination   Sensory Perception Comments denies N/T   Planned Therapy Interventions   Planned Therapy  Interventions ROM;strengthening;stretching;manual therapy   Clinical Impression   Criteria for Skilled Therapeutic Interventions Met yes, treatment indicated   PT Diagnosis decreased functional mobility   Influenced by the following impairments bilateral shoulder pain since March occured post mastectomy, ongoing hormonal therapy causing aches/pains   Functional limitations due to impairments difficutly dressing and taking clothing off, showering, cleaning-vacuuming   Clinical Presentation Stable/Uncomplicated   Clinical Presentation Rationale responded well to joint mobilizations and range of motion.   Clinical Decision Making (Complexity) Low complexity   Therapy Frequency 1 time/week   Predicted Duration of Therapy Intervention (days/wks) 90 days   Risk & Benefits of therapy have been explained Yes   Patient, Family & other staff in agreement with plan of care Yes   Clinical Impression Comments anticipate taper of frequency after 6 weeks   Education Assessment   Preferred Learning Style Listening   Barriers to Learning No barriers   Goal 1   Goal Identifier AROM   Goal Description 1. Patient will report painfree dressing, including taking clothing off overhead in order to resume her PLOF.   Target Date 01/14/20   Total Evaluation Time   PT Eval, Low Complexity Minutes (73315) 17      COPING, INEFFECTIVE

## 2024-05-06 ENCOUNTER — ANCILLARY PROCEDURE (OUTPATIENT)
Dept: BONE DENSITY | Facility: CLINIC | Age: 69
End: 2024-05-06
Attending: PHYSICIAN ASSISTANT
Payer: COMMERCIAL

## 2024-05-06 ENCOUNTER — ANCILLARY PROCEDURE (OUTPATIENT)
Dept: MAMMOGRAPHY | Facility: CLINIC | Age: 69
End: 2024-05-06
Attending: PHYSICIAN ASSISTANT
Payer: COMMERCIAL

## 2024-05-06 DIAGNOSIS — Z78.0 ASYMPTOMATIC MENOPAUSE: ICD-10-CM

## 2024-05-06 DIAGNOSIS — Z12.31 VISIT FOR SCREENING MAMMOGRAM: ICD-10-CM

## 2024-05-06 PROCEDURE — 77080 DXA BONE DENSITY AXIAL: CPT | Mod: TC | Performed by: PHYSICIAN ASSISTANT

## 2024-05-06 PROCEDURE — 77063 BREAST TOMOSYNTHESIS BI: CPT | Mod: TC | Performed by: RADIOLOGY

## 2024-05-06 PROCEDURE — 77067 SCR MAMMO BI INCL CAD: CPT | Mod: TC | Performed by: RADIOLOGY

## 2024-07-18 ENCOUNTER — TELEPHONE (OUTPATIENT)
Dept: FAMILY MEDICINE | Facility: CLINIC | Age: 69
End: 2024-07-18
Payer: COMMERCIAL

## 2024-07-18 NOTE — TELEPHONE ENCOUNTER
Reason for Call:  Appointment Request    Type of appt:  AWV- Part of the Annual Wellness Outreach Project    Requested provider: Charla Velasquez     Reason patient unable to be scheduled:  Had to leave voicemail, left voicemail requesting call back to schedule appointment.     When does patient want to be seen/preferred time:  Any open slot after September.       Call taken on 7/18/2024 at 1:57 PM by Sonido Alfaro

## 2024-07-23 NOTE — PROGRESS NOTES
DISCHARGE  Reason for Discharge: Patient chooses to discontinue therapy. Reported significant improvement at last session and confidence in HEP.     Equipment Issued: none    Discharge Plan: Patient to continue home program.    Referring Provider:  Charla Velasquez         01/15/24 0500   Appointment Info   Signing clinician's name / credentials Vilma Campbell, PT, DPT   Total/Authorized Visits 6 est. per therapist   Visits Used 2   Medical Diagnosis Mixed stress and urge urinary incontinence.   PT Tx Diagnosis Pelvic floor weakness   Quick Adds Pelvic Consent;Certification   Progress Note/Certification   Start of Care Date 01/08/24   Therapy Frequency 1x per week for 3 weeks, 1x per 2 weeks for 7 week adjusting frequency as indicated by patient presentation   Predicted Duration 10 weeks   Certification date from 01/08/24   Certification date to 03/17/24   Progress Note Completed Date 01/08/24   PT Goal 1   Goal Identifier Urinary incontinence   Goal Description Patient will report 0 leaks in 1 week to show improved urinary incontinence   Rationale to maximize safety and independence with performance of ADLs and functional tasks;to maximize safety and independence within the home;to maximize safety and independence within the community   Goal Progress 1 leak in last week that was minimal   Target Date 03/17/24   Subjective Report   Subjective Report Pt returns stating she has significantly reduced leakage and urgency since starting the HEP. Returns with her bladder diary and has good insight on her habits that she has already implemented positive changes for such as reducing irritants.   Objective Measures   Objective Measures Objective Measure 1;Objective Measure 2   Objective Measure 1   Objective Measure PFM endurance   Details E: 7 seconds without waivor, can hold 10 with irregularity   Objective Measure 2   Objective Measure Bladder diary   Details Voids every 1-4 hours. Hydration is a mix of water and  irritants. 1 urinary leak in 2 days walking with a full bladder. Increased frequency in mornings and evenings with irritants   Treatment Interventions (PT)   Interventions Therapeutic Procedure/Exercise;Therapeutic Activity;Neuromuscular Re-education;Manual Therapy   Therapeutic Procedure/Exercise   Therapeutic Procedures: strength, endurance, ROM, flexibility minutes (09110) 10   Ther Proc 1 SL balance   Ther Proc 1 - Details HEP   PTRx Ther Proc 1 Pelvic Floor Muscle Strengthening Basic   PTRx Ther Proc 1 - Details 2x4 seated, 5 second hold, 7 second hold   PTRx Ther Proc 2 Pelvic Floor Muscle Strengthening Quick Flicks   PTRx Ther Proc 2 - Details x6 seated   Skilled Intervention VC/TC to reduce compensations, improve PFM coordination, dosage, palpation internally for activation   Patient Response/Progress Tolerated without complaint, no visual compensations with PFM exercises   PTRx Ther Proc 3 Pelvic Floor Muscle Strengthening Knack   PTRx Ther Proc 3 - Details x4 in clinic, VC sequence and feedback for improved form   PTRx Ther Proc 4 Sit to Stand Pelvic Floor    PTRx Ther Proc 4 - Details x5, demonstration, VC breathing and sequencing   Therapeutic Activity   Therapeutic Activities: dynamic activities to improve functional performance minutes (34476) 25   Therapeutic Activities Ther Act 2   Ther Act 1 Patient Education   Ther Act 1 - Details Discussion on continuation of HEP for continued improvement and maintenance of patient's gains   Ther Act 2 Urgency suppression   Ther Act 2 - Details Review of urgency suppression technqiues   PTRx Ther Act 1 Education Sheet General   PTRx Ther Act 1 - Details Lubricant handout   PTRx Ther Act 2 Bladder Diary   PTRx Ther Act 2 - Details see objective measures. Recommendations to dilute bladder irritants with water, to void every 2-4 hours, to reduce beverages 2 hours prior to bed   Skilled Intervention answered questions to pts satisfaction   Patient Response/Progress  Improved understanding   Intervention (Other)   PTRx Other  1 General Bladder Information   PTRx Other 1 - Details HEP   PTRx Other  2 Urge Incontinence Suppression Techniques   PTRx Other 2 - Details HEP   Education   Learner/Method Patient   Plan   Home program PTRx   Updates to plan of care goal update   Plan for next session recheck PFM coordination, review bladder diary, urgency suppression, biofeedback for assessment?   Comments   Comments Pt is happy with her progress and wishes to trial self management with HEP   Pelvic Health Informed Consent Statement Discussed with patient/guardian reason for referral regarding pelvic health needs and external/internal pelvic floor muscle examination.  Opportunity provided to ask questions and verbal consent for assessment and intervention was given.   Total Session Time   Timed Code Treatment Minutes 35   Total Treatment Time (sum of timed and untimed services) 35

## 2024-11-08 ENCOUNTER — OFFICE VISIT (OUTPATIENT)
Dept: FAMILY MEDICINE | Facility: CLINIC | Age: 69
End: 2024-11-08
Payer: COMMERCIAL

## 2024-11-08 VITALS
RESPIRATION RATE: 18 BRPM | DIASTOLIC BLOOD PRESSURE: 80 MMHG | SYSTOLIC BLOOD PRESSURE: 126 MMHG | WEIGHT: 232.4 LBS | OXYGEN SATURATION: 100 % | TEMPERATURE: 98.1 F | HEART RATE: 62 BPM | BODY MASS INDEX: 35.63 KG/M2

## 2024-11-08 DIAGNOSIS — Z01.818 PRE-OP EXAM: Primary | ICD-10-CM

## 2024-11-08 DIAGNOSIS — E66.812 CLASS 2 SEVERE OBESITY DUE TO EXCESS CALORIES WITH SERIOUS COMORBIDITY AND BODY MASS INDEX (BMI) OF 35.0 TO 35.9 IN ADULT (H): ICD-10-CM

## 2024-11-08 DIAGNOSIS — M17.12 PRIMARY OSTEOARTHRITIS OF LEFT KNEE: ICD-10-CM

## 2024-11-08 DIAGNOSIS — R94.31 NONSPECIFIC ABNORMAL ELECTROCARDIOGRAM (ECG) (EKG): ICD-10-CM

## 2024-11-08 DIAGNOSIS — I10 BENIGN ESSENTIAL HYPERTENSION: ICD-10-CM

## 2024-11-08 DIAGNOSIS — E66.01 CLASS 2 SEVERE OBESITY DUE TO EXCESS CALORIES WITH SERIOUS COMORBIDITY AND BODY MASS INDEX (BMI) OF 35.0 TO 35.9 IN ADULT (H): ICD-10-CM

## 2024-11-08 LAB — HGB BLD-MCNC: 15 G/DL (ref 11.7–15.7)

## 2024-11-08 PROCEDURE — 99207 E-CONSULT TO CARDIOLOGY (ADULT OUTPT PROVIDER TO SPECIALIST WRITTEN QUESTION & RESPONSE): CPT | Performed by: PHYSICIAN ASSISTANT

## 2024-11-08 PROCEDURE — 99214 OFFICE O/P EST MOD 30 MIN: CPT | Performed by: PHYSICIAN ASSISTANT

## 2024-11-08 PROCEDURE — 36415 COLL VENOUS BLD VENIPUNCTURE: CPT | Performed by: PHYSICIAN ASSISTANT

## 2024-11-08 PROCEDURE — 93000 ELECTROCARDIOGRAM COMPLETE: CPT | Performed by: PHYSICIAN ASSISTANT

## 2024-11-08 PROCEDURE — 85018 HEMOGLOBIN: CPT | Performed by: PHYSICIAN ASSISTANT

## 2024-11-08 ASSESSMENT — PAIN SCALES - GENERAL: PAINLEVEL_OUTOF10: SEVERE PAIN (7)

## 2024-11-08 NOTE — PROGRESS NOTES
ADDENDUM: Repeat EKG reviewed from 11/13/2024. EKG with sinus bradycardia, no axis change, no ST-T segment changes, essentially unchanged from prior tracing. Patient is medically optimized for upcoming surgery and does not require further diagnostic evaluation prior to proposed procedure.    Charla Velasquez PA-C  11/13/24 @ 10:23 AM      ADDENDUM: cardiology reviewed EKG from 11/8/2024 and suspects changes are due to lead placement. Will obtain another EKG for review. Surgery approval pending repeat EKG.    Charla Velasquez PA-C  11/11/24 @ 6:38 AM        Preoperative Evaluation  [address] [address]  Phone: [phone]  Primary Provider: Samina Yen MD  Pre-op Performing Provider: Charla Velasquez PA-C  Nov 8, 2024 11/6/2024   Surgical Information   What procedure is being done? Left Total Knee Arthroplasty    [facility] where [facility]    Who is doing the procedure / surgery? Dr Carli Cowart    Date of surgery / procedure: 11/26/24    Time of surgery / procedure: TBD    Where do you plan to recover after surgery? at home with Home Care        Patient-reported     Fax number for surgical facility: [phone]    Assessment & Plan     The proposed surgical procedure is considered INTERMEDIATE risk.      ICD-10-CM    1. Pre-op exam  Z01.818 EKG 12-lead complete w/read - Clinics     Hemoglobin     Hemoglobin      2. Primary osteoarthritis of left knee  M17.12       3. Benign essential hypertension  I10       4. Class 2 severe obesity due to excess calories with serious comorbidity and body mass index (BMI) of 35.0 to 35.9 in adult (H)  E66.812     E66.01     Z68.35       5. Nonspecific abnormal electrocardiogram (ECG) (EKG)  R94.31 Adult E-Consult to Cardiology (Outpt Provider to Specialist Written Question & Response)         - No identified additional risk factors  other than previously addressed    Antiplatelet or Anticoagulation Medication Instructions   - Patient is on no antiplatelet or anticoagulation medications.    Additional Medication Instructions  Take all scheduled medications on the day of surgery EXCEPT for modifications listed below:   - Beta Blockers: Continue taking on the day of surgery.    Recommendation  E-consult to cardiology pending for 2nd opinion on EKG. Surgery approval pending completion of consultation.    Morena Zhou is a 68 year old, presenting for the following:  Pre-Op Exam          11/8/2024     9:41 AM   Additional Questions   Roomed by Shirley BIRD related to upcoming procedure: patient with severe Lt knee OA, undergoing Lt TKA. Presents today for pre-op exam.        11/6/2024   Pre-Op Questionnaire   Have you ever had a heart attack or stroke? No    Have you ever had surgery on your heart or blood vessels, such as a stent placement, a coronary artery bypass, or surgery on an artery in your head, neck, heart, or legs? No    Do you have chest pain with activity? No    Do you have a history of heart failure? No    Do you currently have a cold, bronchitis or symptoms of other infection? No    Do you have a cough, shortness of breath, or wheezing? (!) YES - chronic cough, post-viral, stable    Do you or anyone in your family have previous history of blood clots? No    Do you or does anyone in your family have a serious bleeding problem such as prolonged bleeding following surgeries or cuts? No    Have you ever had problems with anemia or been told to take iron pills? No    Have you had any abnormal blood loss such as black, tarry or bloody stools, or abnormal vaginal bleeding? No    Have you ever had a blood transfusion? No    Are you willing to have a blood transfusion if it is medically needed before, during, or after your surgery? Yes    Have you or any of your relatives ever had problems with anesthesia? (!) YES personal history of  PONV    Do you have sleep apnea, excessive snoring or daytime drowsiness? No    Do you have any artifical heart valves or other implanted medical devices like a pacemaker, defibrillator, or continuous glucose monitor? No    Do you have artificial joints? No    Are you allergic to latex? No        Patient-reported     Health Care Directive  Patient does not have a Health Care Directive: Discussed advance care planning with patient; however, patient declined at this time.    Preoperative Review of    reviewed - no record of controlled substances prescribed.      Status of Chronic Conditions:  See problem list for active medical problems.  Problems all longstanding and stable, except as noted/documented.  See ROS for pertinent symptoms related to these conditions.    Patient Active Problem List    Diagnosis Date Noted    Mixed stress and urge urinary incontinence 01/08/2024     Priority: Medium    Pelvic floor weakness in female 01/08/2024     Priority: Medium    Benign essential hypertension      Priority: Medium    Class 2 severe obesity due to excess calories with serious comorbidity and body mass index (BMI) of 35.0 to 35.9 in adult (H)      Priority: Medium    PONV (postoperative nausea and vomiting)      Priority: Medium    History of breast cancer 2018     Priority: Medium     grade 1 invasive ductal CA right breast, multifocal associated with DCIS, pT1a, pN0, cM0, hormone receptor strongly positive, HER2 negative with Oncotype DX score of 10; s/p mastectomy; intolerant of hormone therapy        Past Medical History:   Diagnosis Date    Arthritis     Benign essential hypertension     Cancer (H) 2018    History of breast cancer 2018    grade 1 invasive ductal CA right breast, multifocal associated with DCIS, pT1a, pN0, cM0, hormone receptor strongly positive, HER2 negative with Oncotype DX score of 10; s/p mastectomy; intolerant of hormone therapy    Obesity (BMI 30-39.9)     PONV (postoperative nausea and  vomiting)      Past Surgical History:   Procedure Laterality Date    BIOPSY  2018    COLONOSCOPY  08    DISSECT LYMPH NODE AXILLA Right 2018    Procedure: AXILLARY NODE DISECTION ( COLT );  Surgeon: Adolfo Perales MD;  Location: [facility] OR    HYSTERECTOMY, PAP NO LONGER INDICATED      TAHBSO for endometriosis    INSERT TISSUE EXPANDER BREAST Bilateral 2018    Procedure: INSERT RIGHT  TISSUE EXPANDER BREAST ( LASHAY );  Surgeon: Mio Rodríguez MD;  Location: [facility] OR    MASTECTOMY SIMPLE, SENTINEL NODE, COMBINED Right 2018    Procedure: RIGHT MASTECTOMY WITH RIGHT SENTINEL NODE BIOPSY (  COLT );  Surgeon: Adolfo Perales MD;  Location: [facility] OR    SOFT TISSUE SURGERY   Synovial Cyst removal on Spine    TONSILLECTOMY & ADENOIDECTOMY      TUBAL LIGATION       Current Outpatient Medications   Medication Sig Dispense Refill    acyclovir (ZOVIRAX) 400 MG tablet TAKE 1 TABLET(400 MG) BY MOUTH THREE TIMES DAILY 15 tablet 3    atenolol (TENORMIN) 50 MG tablet Take 1 tablet (50 mg) by mouth daily 90 tablet 3    azelaic acid (FINACIA) 15 % external gel Apply topically 2 times daily 50 g 4    Cholecalciferol (VITAMIN D3 PO) Take 5,000 Units by mouth every evening Takes at 18:00      l-tyrosine 500 MG TABS Take 2 tablets by mouth daily.      metroNIDAZOLE (METROGEL) 0.75 % external gel Apply topically 2 times daily 45 g 11    Omega-3 Fatty Acids (FISH OIL PO) Take 600 mg by mouth every evening Takes at 18:00      Probiotic Product (PROBIOTIC DAILY PO) Take 1 capsule by mouth every evening Acidophilus with goats milk 343 mg- 10 mg.  Takes at 18:00         Allergies   Allergen Reactions    Codeine Sulfate Diarrhea    Penicillins Unknown     as a child        Social History     Tobacco Use    Smoking status: Former     Current packs/day: 0.00     Average packs/day: (0.2 ttl pk-yrs)     Types: Cigarettes     Start date: 1977     Quit date: 10/19/1997     Years since quittin.0     "Smokeless tobacco: Never    Tobacco comments:     Smoked a couple of cigarettes a day.   Substance Use Topics    Alcohol use: Yes     Comment: 3 beers - 3 times a week; 2 glasses wine - 2 times a week       History   Drug Use No             Review of Systems  Constitutional, HEENT, cardiovascular, pulmonary, GI, , musculoskeletal, neuro, skin, endocrine and psych systems are negative, except as otherwise noted.    Objective    /80   Pulse 62   Temp 98.1  F (36.7  C)   Resp 18   Wt 105.4 kg (232 lb 6.4 oz)   SpO2 100%   BMI 35.63 kg/m     Estimated body mass index is 35.63 kg/m  as calculated from the following:    Height as of 11/29/23: 1.72 m (5' 7.72\").    Weight as of this encounter: 105.4 kg (232 lb 6.4 oz).  Physical Exam  GENERAL:  WDWN, no acute distress  PSYCH: pleasant, cooperative  EYES: no discharge, no injection  HENT:  Normocephalic. Moist mucus membranes. Oropharynx clear, uvula midline.  NECK:  Supple, symmetric, no LUIS  LUNGS:  Clear to auscultation bilaterally without rhonchi, rales, or wheeze. Chest rise symmetric and no tenderness to palpation.  HEART:  Regular rate & rhythm. No murmur, gallop, or rub.  EXTREMITIES:  No gross deformities, moves all 4 limbs spontaneously, no peripheral edema  SKIN:  Warm and dry, no rash or suspicious lesions    NEUROLOGIC:  Alert, sensation grossly intact.    Recent Labs   Lab Test 11/29/23  1207      POTASSIUM 3.8   CR 0.67   A1C 5.2        Diagnostics  Recent Results (from the past 24 hours)   Hemoglobin    Collection Time: 11/08/24 10:28 AM   Result Value Ref Range    Hemoglobin 15.0 11.7 - 15.7 g/dL      EKG: sinus bradycardia, possible new LBBB in V1. E-consult sent to cardiology for 2nd opinion    Revised Cardiac Risk Index (RCRI)  The patient has the following serious cardiovascular risks for perioperative complications:   - No serious cardiac risks = 0 points     RCRI Interpretation: 0 points: Class I (very low risk - 0.4% complication " rate)         Signed Electronically by: Charla Velasquez PA-C  A copy of this evaluation report is provided to the requesting physician.

## 2024-11-08 NOTE — Clinical Note
Please call patient and let her know the repeat EKG was completely normal. No need for further testing and she has been cleared for surgery as scheduled.

## 2024-11-08 NOTE — Clinical Note
Please contact patient and schedule for MA only visit for repeat EKG. Cardiology suspects abnormalities seen were due to lead placement rather than anything more serious so we just need to recheck EKG prior to surgery (DOS 11/26/2024) to ensure it remains stable.

## 2024-11-10 ENCOUNTER — E-CONSULT (OUTPATIENT)
Dept: CARDIOLOGY | Facility: CLINIC | Age: 69
End: 2024-11-10
Payer: COMMERCIAL

## 2024-11-10 NOTE — PROGRESS NOTES
11/10/2024     E-Consult has been accepted.    Interprofessional consultation requested by:  Charla Velasquez PA-C      Clinical Question/Purpose: MY CLINICAL QUESTION IS: EKG review requested. Obtained EKG for routine pre-op evaluation, patient asymptomatic, low risk aside from age. Compared with EKG done in 2021; I'm seeing a possible LBBB in V1 that would be new. Would like 2nd opinion on EKG from 11/8/2024. If formal cardiac clearance is recommended, please disregard e-visit and coordinate with clinic scheduler. Planned Lt TKA for 11/26/2024.    Patient assessment and information reviewed:     Recommendations:     The EKG tracing from 11/8/2024 is technically limited and I suspect that the prominent voltage in leads V1 and V2 is related to lead placement.  I would recommend repeating the EKG prior to making a decision regarding cardiology consultation.  Thank you.      The recommendations provided in this E-Consult are based on a review of clinical data pertinent to the clinical question presented, without a review of the patient's complete medical record or, the benefit of a comprehensive in-person or virtual patient evaluation. This consultation should not replace the clinical judgement and evaluation of the provider ordering this E-Consult. Any new clinical issues, or changes in patient status since the filing of this E-Consult will need to be taken into account when assessing these recommendations. Please contact me if you have further questions.    My total time spent reviewing clinical information and formulating assessment was 10 minutes.        Lelo Head MD

## 2024-11-11 ENCOUNTER — TELEPHONE (OUTPATIENT)
Dept: INTERNAL MEDICINE | Facility: CLINIC | Age: 69
End: 2024-11-11
Payer: COMMERCIAL

## 2024-11-11 NOTE — TELEPHONE ENCOUNTER
Pt called requesting update from CHRISTIANO Velasquez on cardiology consult. Pt had  abnormal  EKG on 11/08/2024. Please advice.

## 2024-11-11 NOTE — TELEPHONE ENCOUNTER
Patient needs a repeat EKG as cardiology felt the abnormalities seen were due to lead placement/artifact. Please schedule MA-only appointment ASAP and repeat EKG

## 2024-11-11 NOTE — TELEPHONE ENCOUNTER
Surgical Information   What procedure is being done? Left Total Knee Arthroplasty    Facility or Hospital where procedure/[facility] be [facility]    Who is doing the procedure / surgery? Dr Carli Cowart    Date of surgery / procedure: 11/26/24    Time of surgery / procedure: TBD    Where do you plan to recover after surgery? at home with Home Care        Patient-reported      Fax number for surgical facility: [phone]

## 2024-11-11 NOTE — TELEPHONE ENCOUNTER
Scheduled pt for MA appt 11/13/24 at 10am for EKG.    DOS 11/26/24.    Pt stated she will need to Cx AWV 12/04/24 but wants to wait on Cxing for now.     Eugenia Zabala on 11/11/2024 at 12:36 PM

## 2024-11-12 ENCOUNTER — PATIENT OUTREACH (OUTPATIENT)
Dept: CARE COORDINATION | Facility: CLINIC | Age: 69
End: 2024-11-12
Payer: COMMERCIAL

## 2024-11-13 ENCOUNTER — ALLIED HEALTH/NURSE VISIT (OUTPATIENT)
Dept: FAMILY MEDICINE | Facility: CLINIC | Age: 69
End: 2024-11-13
Payer: COMMERCIAL

## 2024-11-13 DIAGNOSIS — R94.31 NONSPECIFIC ABNORMAL ELECTROCARDIOGRAM (ECG) (EKG): Primary | ICD-10-CM

## 2024-11-13 PROCEDURE — 93000 ELECTROCARDIOGRAM COMPLETE: CPT

## 2024-11-13 PROCEDURE — 99207 PR NO CHARGE NURSE ONLY: CPT

## 2024-11-13 NOTE — NURSING NOTE
Patient presents today to have EKG redone. Patient has no further questions or concerns.      Nikole Serrano CMA 11/13/2024 10:20AM

## 2024-11-13 NOTE — NURSING NOTE
Relayed Ron's message to pt regarding EKG and pre op clearance. Pt was satisfied     Ada GALLEGOS  Visit Facilitator

## 2025-04-07 ENCOUNTER — PATIENT OUTREACH (OUTPATIENT)
Dept: CARE COORDINATION | Facility: CLINIC | Age: 70
End: 2025-04-07
Payer: COMMERCIAL

## 2025-04-07 ENCOUNTER — ANCILLARY ORDERS (OUTPATIENT)
Dept: FAMILY MEDICINE | Facility: CLINIC | Age: 70
End: 2025-04-07
Payer: COMMERCIAL

## 2025-04-07 DIAGNOSIS — Z12.31 VISIT FOR SCREENING MAMMOGRAM: Primary | ICD-10-CM

## 2025-05-12 ENCOUNTER — ANCILLARY PROCEDURE (OUTPATIENT)
Dept: MAMMOGRAPHY | Facility: CLINIC | Age: 70
End: 2025-05-12
Attending: PHYSICIAN ASSISTANT
Payer: COMMERCIAL

## 2025-05-12 DIAGNOSIS — Z12.31 VISIT FOR SCREENING MAMMOGRAM: ICD-10-CM

## 2025-05-12 PROCEDURE — 77067 SCR MAMMO BI INCL CAD: CPT | Mod: TC | Performed by: RADIOLOGY

## 2025-05-12 PROCEDURE — 77063 BREAST TOMOSYNTHESIS BI: CPT | Mod: TC | Performed by: RADIOLOGY

## (undated) DEVICE — SYR 10ML FINGER CONTROL W/O NDL 309695

## (undated) DEVICE — DRAIN JACKSON PRATT 15FR ROUND SU130-1323

## (undated) DEVICE — SPONGE LAP 18X18" X8435

## (undated) DEVICE — BNDG ELASTIC 6" DBL LENGTH UNSTERILE 6611-16

## (undated) DEVICE — SOL NACL 0.9% IRRIG 1000ML BOTTLE 07138-09

## (undated) DEVICE — ESU GROUND PAD UNIVERSAL W/O CORD

## (undated) DEVICE — KIT SPY ELITE DISP KIT W/DRAPE SGL PACK LC3001

## (undated) DEVICE — SU VICRYL 2-0 CT-1 27" UND J259H

## (undated) DEVICE — DRAPE BREAST/CHEST 29420

## (undated) DEVICE — SU VICRYL 3-0 PS-1 18" UND J683

## (undated) DEVICE — SU VICRYL 2-0 TIE 12X18" J905T

## (undated) DEVICE — BLADE KNIFE SURG 15 371115

## (undated) DEVICE — LINEN TOWEL PACK X5 5464

## (undated) DEVICE — GLOVE PROTEXIS W/NEU-THERA 7.5  2D73TE75

## (undated) DEVICE — SYR 50ML LL W/O NDL 309653

## (undated) DEVICE — PREP SKIN SCRUB TRAY 4461A

## (undated) DEVICE — DRAIN JACKSON PRATT RESERVOIR 100ML SU130-1305

## (undated) DEVICE — SU VICRYL 4-0 PS-2 18" UND J496H

## (undated) DEVICE — SOL WATER IRRIG 1000ML BOTTLE 2F7114

## (undated) DEVICE — GLOVE PROTEXIS BLUE W/NEU-THERA 7.5  2D73EB75

## (undated) DEVICE — GLOVE PROTEXIS MICRO 7.5  2D73PM75

## (undated) DEVICE — NDL 25GA 1.5" 305127

## (undated) DEVICE — MANIFOLD NEPTUNE 4 PORT 700-20

## (undated) DEVICE — SU SILK 2-0 FSL 18" 677G

## (undated) DEVICE — DRSG STERI STRIP 1/2X4" R1547

## (undated) DEVICE — DRSG KERLIX 4 1/2"X4YDS ROLL 6715

## (undated) DEVICE — PAD CHUX UNDERPAD 23X24" 7136

## (undated) DEVICE — PACK MAJOR SBA15MAFSI

## (undated) DEVICE — ESU PENCIL W/SMOKE EVAC NEPTUNE STRYKER 0703-046-000

## (undated) RX ORDER — HYDROMORPHONE HYDROCHLORIDE 1 MG/ML
INJECTION, SOLUTION INTRAMUSCULAR; INTRAVENOUS; SUBCUTANEOUS
Status: DISPENSED
Start: 2018-12-14

## (undated) RX ORDER — CLINDAMYCIN PHOSPHATE 900 MG/50ML
INJECTION, SOLUTION INTRAVENOUS
Status: DISPENSED
Start: 2018-12-14

## (undated) RX ORDER — PROPOFOL 10 MG/ML
INJECTION, EMULSION INTRAVENOUS
Status: DISPENSED
Start: 2018-12-14

## (undated) RX ORDER — KETAMINE HCL IN NACL, ISO-OSM 100MG/10ML
SYRINGE (ML) INJECTION
Status: DISPENSED
Start: 2018-12-14

## (undated) RX ORDER — BUPIVACAINE HYDROCHLORIDE 5 MG/ML
INJECTION, SOLUTION EPIDURAL; INTRACAUDAL
Status: DISPENSED
Start: 2018-12-14

## (undated) RX ORDER — BUPIVACAINE HYDROCHLORIDE AND EPINEPHRINE 5; 5 MG/ML; UG/ML
INJECTION, SOLUTION EPIDURAL; INTRACAUDAL; PERINEURAL
Status: DISPENSED
Start: 2018-12-14

## (undated) RX ORDER — SCOLOPAMINE TRANSDERMAL SYSTEM 1 MG/1
PATCH, EXTENDED RELEASE TRANSDERMAL
Status: DISPENSED
Start: 2018-12-14

## (undated) RX ORDER — LIDOCAINE HYDROCHLORIDE 10 MG/ML
INJECTION, SOLUTION EPIDURAL; INFILTRATION; INTRACAUDAL; PERINEURAL
Status: DISPENSED
Start: 2018-12-14

## (undated) RX ORDER — DEXAMETHASONE SODIUM PHOSPHATE 4 MG/ML
INJECTION, SOLUTION INTRA-ARTICULAR; INTRALESIONAL; INTRAMUSCULAR; INTRAVENOUS; SOFT TISSUE
Status: DISPENSED
Start: 2018-12-14

## (undated) RX ORDER — LIDOCAINE HYDROCHLORIDE 20 MG/ML
INJECTION, SOLUTION EPIDURAL; INFILTRATION; INTRACAUDAL; PERINEURAL
Status: DISPENSED
Start: 2018-12-14

## (undated) RX ORDER — ONDANSETRON 2 MG/ML
INJECTION INTRAMUSCULAR; INTRAVENOUS
Status: DISPENSED
Start: 2018-12-14

## (undated) RX ORDER — FENTANYL CITRATE 50 UG/ML
INJECTION, SOLUTION INTRAMUSCULAR; INTRAVENOUS
Status: DISPENSED
Start: 2018-12-14

## (undated) RX ORDER — LIDOCAINE HYDROCHLORIDE 10 MG/ML
INJECTION, SOLUTION INFILTRATION; PERINEURAL
Status: DISPENSED
Start: 2018-12-14

## (undated) RX ORDER — ACYCLOVIR 200 MG/1
CAPSULE ORAL
Status: DISPENSED
Start: 2018-12-14